# Patient Record
Sex: MALE | Race: ASIAN | NOT HISPANIC OR LATINO | Employment: UNEMPLOYED | URBAN - METROPOLITAN AREA
[De-identification: names, ages, dates, MRNs, and addresses within clinical notes are randomized per-mention and may not be internally consistent; named-entity substitution may affect disease eponyms.]

---

## 2017-02-20 ENCOUNTER — OFFICE VISIT (OUTPATIENT)
Dept: FAMILY MEDICINE | Facility: CLINIC | Age: 77
End: 2017-02-20

## 2017-02-20 VITALS
SYSTOLIC BLOOD PRESSURE: 142 MMHG | BODY MASS INDEX: 29.19 KG/M2 | DIASTOLIC BLOOD PRESSURE: 78 MMHG | HEART RATE: 80 BPM | TEMPERATURE: 98.4 F | WEIGHT: 157.6 LBS

## 2017-02-20 DIAGNOSIS — M1A.9XX0 CHRONIC GOUT WITHOUT TOPHUS, UNSPECIFIED CAUSE, UNSPECIFIED SITE: ICD-10-CM

## 2017-02-20 DIAGNOSIS — K21.9 GASTROESOPHAGEAL REFLUX DISEASE, ESOPHAGITIS PRESENCE NOT SPECIFIED: ICD-10-CM

## 2017-02-20 DIAGNOSIS — E03.9 HYPOTHYROIDISM, UNSPECIFIED TYPE: Primary | ICD-10-CM

## 2017-02-20 DIAGNOSIS — G62.9 NEUROPATHY: ICD-10-CM

## 2017-02-20 LAB — TSH SERPL DL<=0.05 MIU/L-ACNC: 5.5 UIU/ML (ref 0.3–5)

## 2017-02-20 RX ORDER — COLCHICINE 0.6 MG/1
0.6 TABLET ORAL DAILY
Qty: 30 TABLET | Refills: 5 | Status: SHIPPED | OUTPATIENT
Start: 2017-02-20 | End: 2017-04-12

## 2017-02-20 RX ORDER — OMEGA-3 FATTY ACIDS/FISH OIL 300-1000MG
400 CAPSULE ORAL EVERY 8 HOURS PRN
Qty: 120 CAPSULE | Refills: 0 | Status: SHIPPED | OUTPATIENT
Start: 2017-02-20 | End: 2017-06-02

## 2017-02-20 RX ORDER — GABAPENTIN 100 MG/1
100 CAPSULE ORAL 2 TIMES DAILY
Qty: 60 CAPSULE | Refills: 3 | Status: SHIPPED | OUTPATIENT
Start: 2017-02-20 | End: 2017-11-03

## 2017-02-20 RX ORDER — ALLOPURINOL 100 MG/1
100 TABLET ORAL DAILY
Qty: 30 TABLET | Refills: 11 | Status: SHIPPED | OUTPATIENT
Start: 2017-02-20 | End: 2018-03-14

## 2017-02-20 NOTE — PROGRESS NOTES
SUBJECTIVE       Rafy Riggins is a 77 year old male with a PMH significant for:     Patient Active Problem List   Diagnosis     HTN (hypertension)     Back pain     Poor vision     S/P lumbar spinal fusion     Colon polyp     Diabetes mellitus, type 2 (H)     Obesity     Lumbosacral radiculopathy at S1     He presents to discuss:    Left foot pain:  Has long history of pain in left foot, with intermittent flare ups. Thought by his PCP to be secondary to osteoarthritis vs gout. Uric acid level was elevated at 8.7 in 10/2016, however patient declined any uric acid lowering medications. His pain has been treated effectively with intermittent NSAIDs in the past. His current pain has been present over the past week. It was located initially over the heel and dorsal foot, and is now mainly localized in the great toe.  He has had some swelling of the foot that comes and goes. No fevers or chills.  He has not been taking any pain medication for this pain as he was out of pills. He was previously taking gabapentin twice daily which was helpful for his neuropathy pains down his legs, but he has been out of this for about 1 month. He is unsure of his past dose of gabapentin.     He also requests a handicap parking form as he has a hard time ambulating even short distances due to lower extremity pain. He requires the assistance of a cane when walking.  The handicap form was filled out today and given to patient.     PMH, Medications and Allergies were reviewed and updated as needed.        REVIEW OF SYSTEMS     Pertinent positives and negatives noted in HPI.         OBJECTIVE     Vitals:    02/20/17 0900   BP: 142/78   BP Location: Left arm   Patient Position: Chair   Cuff Size: Adult Large   Pulse: 80   Temp: 98.4  F (36.9  C)   TempSrc: Oral   Weight: 157 lb 9.6 oz (71.5 kg)      Body mass index is 29.19 kg/(m^2).    General: Alert, NAD  Neck: No cervical adenopathy, no thyromegaly  CV: RRR w/o m/r/g  Pulm:  CTAB without wheezes or crackles, no increased WOB  Extremities: No RAD, tenderness to palpation over left great toe, no appreciable redness, warmth, or swelling, good ROM of both feet  Psych: Pleasant mood, normal affect      No results found for this or any previous visit (from the past 24 hour(s)).        ASSESSMENT AND PLAN     Rafy was seen today for musculoskeletal problem, musculoskeletal problem and forms.    Diagnoses and all orders for this visit:    Hypothyroidism, unspecified type: Taking Synthroid 25mcg daily (he shows me the bottle). TSH last checked 12/9/16.  PCP had planned to recheck his TSH after a month, so will draw this today.   -     TSH  Sensitive ([facility])    Gastroesophageal reflux disease, esophagitis presence not specified  -     ranitidine (ZANTAC) 150 MG tablet; Take 1 tablet (150 mg) by mouth 2 times daily as needed for heartburn    Chronic gout without tophus, unspecified cause, unspecified site: Patient may be noting another gout flare in his left foot. Since NSAIDs have been helpful for flares in the past and no known cardiac or renal disease, will order a course of ibuprofen and advised to schedule it for 7 days and then follow-up to ensure improvement. I am concerned that since he has had at least 2 flares in the past year and uric acid level of 8.7, he may be at risk for additional gout flares. He is agreeable to taking a urate lowering medication including allopurinol with colchicine (for at least 6 months), so will start these today. Will recheck uric acid in 4 weeks and titrate allopurinol as needed.   -     ibuprofen 200 MG capsule; Take 400 mg by mouth every 8 hours as needed for pain  -     allopurinol (ZYLOPRIM) 100 MG tablet; Take 1 tablet (100 mg) by mouth daily  -     colchicine (COLCRYS) 0.6 MG tablet; Take 1 tablet (0.6 mg) by mouth daily    Neuropathy (H): Pt requests a refill on gabapentin which has been helpful for his lower extremity neuropathy pains in the  past. It is not clear what his previous dose was, but he states he previously took it twice daily so will start with a low dose of 100mg BID.   -     gabapentin (NEURONTIN) 100 MG capsule; Take 1 capsule (100 mg) by mouth 2 times daily          RTC in 1 week for follow up of foot pain or sooner if develops new or worsening symptoms.    Patient's plan of care was discussed with Dr. Lange.    Laura Saavedra MD PGY-3  Pager #: [phone]

## 2017-02-20 NOTE — PROGRESS NOTES
Preceptor attestation:  Vital signs reviewed: /78 (BP Location: Left arm, Patient Position: Chair, Cuff Size: Adult Large)  Pulse 80  Temp 98.4  F (36.9  C) (Oral)  Wt 157 lb 9.6 oz (71.5 kg)  BMI 29.19 kg/m2    Preceptor attestation:  Patient seen and discussed with the resident. Assessment and plan reviewed with resident and agreed upon.    Supervising physician: Lou Lange MD  [facility]

## 2017-02-20 NOTE — MR AVS SNAPSHOT
After Visit Summary   2017    Rafy Riggins    MRN: [id number]           Patient Information     Date Of Birth          1940        Visit Information        Provider Department      2017 8:20 AM Laura Saavedra MD [facility]        Today's Diagnoses     Hypothyroidism, unspecified type    -  1    Gastroesophageal reflux disease, esophagitis presence not specified        Chronic gout without tophus, unspecified cause, unspecified site        Neuropathy (H)          Care Instructions    Return in 1 week to follow-up on foot pain and labs        Follow-ups after your visit        Who to contact     Please call your clinic at [phone] to:    Ask questions about your health    Make or cancel appointments    Discuss your medicines    Learn about your test results    Speak to your doctor   If you have compliments or concerns about an experience at your clinic, or if you wish to file a complaint, please contact [facility] Patient Relations at [phone] or email us at [email or web address]         Additional Information About Your Visit        MyChart Information     [employer] is an electronic gateway that provides easy, online access to your medical records. With [employer], you can request a clinic appointment, read your test results, renew a prescription or communicate with your care team.     To sign up for [employer] visit the website at [email or web address]   You will be asked to enter the access code listed below, as well as some personal information. Please follow the directions to create your username and password.     Your access code is: [email or web address]  Expires: 3/9/2017  9:23 AM     Your access code will  in 90 days. If you need help or a new code, please contact your [facility] or call [phone] for assistance.        Care EveryWhere ID     This is your Care EveryWhere ID. This could be used  by other organizations to access your [place] medical records  [id number]        Your Vitals Were     Pulse Temperature BMI (Body Mass Index)             80 98.4  F (36.9  C) (Oral) 29.19 kg/m2          Blood Pressure from Last 3 Encounters:   02/20/17 142/78   12/09/16 167/82   10/20/16 153/86    Weight from Last 3 Encounters:   02/20/17 157 lb 9.6 oz (71.5 kg)   12/09/16 156 lb (70.8 kg)   10/20/16 156 lb 6.4 oz (70.9 kg)              We Performed the Following     TSH  Sensitive ([facility])          Today's Medication Changes          These changes are accurate as of: 2/20/17  9:24 AM.  If you have any questions, ask your nurse or doctor.               Start taking these medicines.        Dose/Directions    allopurinol 100 MG tablet   Commonly known as:  ZYLOPRIM   Used for:  Chronic gout without tophus, unspecified cause, unspecified site   Started by:  Laura Saavedra MD        Dose:  100 mg   Take 1 tablet (100 mg) by mouth daily   Quantity:  30 tablet   Refills:  11       colchicine 0.6 MG tablet   Commonly known as:  COLCRYS   Used for:  Chronic gout without tophus, unspecified cause, unspecified site   Started by:  Laura Saavedra MD        Dose:  0.6 mg   Take 1 tablet (0.6 mg) by mouth daily   Quantity:  30 tablet   Refills:  5         These medicines have changed or have updated prescriptions.        Dose/Directions    gabapentin 100 MG capsule   Commonly known as:  NEURONTIN   This may have changed:    - medication strength  - how much to take  - when to take this   Used for:  Neuropathy (H)   Changed by:  Laura Saavedra MD        Dose:  100 mg   Take 1 capsule (100 mg) by mouth 2 times daily   Quantity:  60 capsule   Refills:  3       * ibuprofen 400 MG tablet   Commonly known as:  ADVIL/MOTRIN   This may have changed:  Another medication with the same name was added. Make sure you understand how and when to take each.   Used for:  Acute left ankle pain   Changed by:  Romaine Blank DO         Dose:  400 mg   Take 1 tablet (400 mg) by mouth every 8 hours as needed for moderate pain Please take for 7-10 days   Quantity:  120 tablet   Refills:  0       * ibuprofen 200 MG capsule   This may have changed:  You were already taking a medication with the same name, and this prescription was added. Make sure you understand how and when to take each.   Used for:  Chronic gout without tophus, unspecified cause, unspecified site   Changed by:  Laura Saavedra MD        Dose:  400 mg   Take 400 mg by mouth every 8 hours as needed for pain   Quantity:  120 capsule   Refills:  0       ranitidine 150 MG tablet   Commonly known as:  ZANTAC   This may have changed:    - when to take this  - reasons to take this   Used for:  Gastroesophageal reflux disease, esophagitis presence not specified   Changed by:  Laura Saavedra MD        Dose:  150 mg   Take 1 tablet (150 mg) by mouth 2 times daily as needed for heartburn   Quantity:  60 tablet   Refills:  3       * Notice:  This list has 2 medication(s) that are the same as other medications prescribed for you. Read the directions carefully, and ask your doctor or other care provider to review them with you.         Where to get your medicines      These medications were sent to [facility] [address] [place] [address] [address] [facility] [address]  [address] [facility] [address]    Hours:  24-hours Phone:  [phone]     allopurinol 100 MG tablet    colchicine 0.6 MG tablet    gabapentin 100 MG capsule    ibuprofen 200 MG capsule    ranitidine 150 MG tablet                Primary Care Provider Office Phone # Fax #    Romaine Blank [phone]       [address] [address]        Thank you!     Thank you for choosing [facility]  for your care. Our goal is always to provide you with excellent care. Hearing back from our patients is one way we can continue to improve our  services. Please take a few minutes to complete the written survey that you may receive in the mail after your visit with us. Thank you!             Your Updated Medication List - Protect others around you: Learn how to safely use, store and throw away your medicines at www.disposemymeds.org.          This list is accurate as of: 2/20/17  9:24 AM.  Always use your most recent med list.                   Brand Name Dispense Instructions for use    Adult Blood Pressure Cuff Lg Kit     1 kit    1 Device daily       allopurinol 100 MG tablet    ZYLOPRIM    30 tablet    Take 1 tablet (100 mg) by mouth daily       Ankle Stirrup Brace/Left Misc      Use as needed       aspirin 81 MG tablet     90 tablet    Take 1 tablet (81 mg) by mouth daily       atorvastatin 40 MG tablet    LIPITOR    90 tablet    Take 1 tablet (40 mg) by mouth daily       colchicine 0.6 MG tablet    COLCRYS    30 tablet    Take 1 tablet (0.6 mg) by mouth daily       gabapentin 100 MG capsule    NEURONTIN    60 capsule    Take 1 capsule (100 mg) by mouth 2 times daily       * ibuprofen 400 MG tablet    ADVIL/MOTRIN    120 tablet    Take 1 tablet (400 mg) by mouth every 8 hours as needed for moderate pain Please take for 7-10 days       * ibuprofen 200 MG capsule     120 capsule    Take 400 mg by mouth every 8 hours as needed for pain       levothyroxine 50 MCG tablet    SYNTHROID/LEVOTHROID    90 tablet    Take 1 tablet (50 mcg) by mouth daily       lisinopril 20 MG tablet    PRINIVIL/ZESTRIL    90 tablet    Take 1 tablet (20 mg) by mouth daily       multivitamin, therapeutic with minerals Tabs tablet     100 tablet    Take 1 tablet by mouth daily       ranitidine 150 MG tablet    ZANTAC    60 tablet    Take 1 tablet (150 mg) by mouth 2 times daily as needed for heartburn       * Notice:  This list has 2 medication(s) that are the same as other medications prescribed for you. Read the directions carefully, and ask your doctor or other care provider to  review them with you.

## 2017-03-01 ENCOUNTER — OFFICE VISIT (OUTPATIENT)
Dept: FAMILY MEDICINE | Facility: CLINIC | Age: 77
End: 2017-03-01

## 2017-03-01 VITALS
TEMPERATURE: 98.3 F | DIASTOLIC BLOOD PRESSURE: 73 MMHG | BODY MASS INDEX: 29.63 KG/M2 | SYSTOLIC BLOOD PRESSURE: 139 MMHG | WEIGHT: 160 LBS | HEART RATE: 60 BPM

## 2017-03-01 DIAGNOSIS — E03.9 HYPOTHYROIDISM, UNSPECIFIED TYPE: ICD-10-CM

## 2017-03-01 DIAGNOSIS — M10.9 GOUT OF FOOT, UNSPECIFIED CAUSE, UNSPECIFIED CHRONICITY, UNSPECIFIED LATERALITY: Primary | ICD-10-CM

## 2017-03-01 RX ORDER — LEVOTHYROXINE SODIUM 25 UG/1
50 TABLET ORAL DAILY
Qty: 60 TABLET | Refills: 3 | Status: SHIPPED | OUTPATIENT
Start: 2017-03-01 | End: 2017-04-12

## 2017-03-01 NOTE — PATIENT INSTRUCTIONS
Schedule lab only appt in 6 weeks to recheck uric acid and thyroid labs    Return 3 days after lab appt for a clinic visit to discuss results

## 2017-03-01 NOTE — PROGRESS NOTES
Preceptor attestation:  Patient seen and discussed with the resident. Assessment and plan reviewed with resident and agreed upon.  Supervising physician: Leo James  [facility]

## 2017-03-01 NOTE — MR AVS SNAPSHOT
After Visit Summary   3/1/2017    Rafy Rigigns    MRN: [id number]           Patient Information     Date Of Birth          1940        Visit Information        Provider Department      3/1/2017 11:00 AM Laura Saavedra MD [facility]        Today's Diagnoses     Hypothyroidism due to acquired atrophy of thyroid          Care Instructions    Schedule lab only appt in 6 weeks to recheck uric acid and thyroid labs    Return 3 days after lab appt for a clinic visit to discuss results        Follow-ups after your visit        Future tests that were ordered for you today     Open Future Orders        Priority Expected Expires Ordered    Uric Acid ([facility]) Routine  3/1/2020 3/1/2017    TSH  Sensitive ([facility]) Routine 3/1/2017 2017 3/1/2017            Who to contact     Please call your clinic at [phone] to:    Ask questions about your health    Make or cancel appointments    Discuss your medicines    Learn about your test results    Speak to your doctor   If you have compliments or concerns about an experience at your clinic, or if you wish to file a complaint, please contact [facility] Patient Relations at [phone] or email us at [email or web address]         Additional Information About Your Visit        MyChart Information     [employer] is an electronic gateway that provides easy, online access to your medical records. With [employer], you can request a clinic appointment, read your test results, renew a prescription or communicate with your care team.     To sign up for [employer] visit the website at [email or web address]   You will be asked to enter the access code listed below, as well as some personal information. Please follow the directions to create your username and password.     Your access code is: [email or web address]  Expires: 3/9/2017  9:23 AM     Your access code will  in 90 days. If you need help or a new code, please  contact your [facility] or call [phone] for assistance.        Care EveryWhere ID     This is your Care EveryWhere ID. This could be used by other organizations to access your [place] medical records  [id number]        Your Vitals Were     Pulse Temperature BMI (Body Mass Index)             60 98.3  F (36.8  C) (Oral) 29.63 kg/m2          Blood Pressure from Last 3 Encounters:   03/01/17 139/73   02/20/17 142/78   12/09/16 167/82    Weight from Last 3 Encounters:   03/01/17 160 lb (72.6 kg)   02/20/17 157 lb 9.6 oz (71.5 kg)   12/09/16 156 lb (70.8 kg)                 Today's Medication Changes          These changes are accurate as of: 3/1/17 11:31 AM.  If you have any questions, ask your nurse or doctor.               These medicines have changed or have updated prescriptions.        Dose/Directions    levothyroxine 25 MCG tablet   Commonly known as:  SYNTHROID/LEVOTHROID   This may have changed:  medication strength   Used for:  Hypothyroidism due to acquired atrophy of thyroid   Changed by:  Laura Saavedra MD        Dose:  50 mcg   Take 2 tablets (50 mcg) by mouth daily   Quantity:  60 tablet   Refills:  3            Where to get your medicines      These medications were sent to [facility] [address] [place] [address] [address] [facility] [address]  [address] [facility] [address]    Hours:  24-hours Phone:  [phone]     levothyroxine 25 MCG tablet                Primary Care Provider Office Phone # Fax #    Romaine DO Rosamaria [phone]       [address] [address]        Thank you!     Thank you for choosing [facility]  for your care. Our goal is always to provide you with excellent care. Hearing back from our patients is one way we can continue to improve our services. Please take a few minutes to complete the written survey that you may receive in the mail after your visit  with us. Thank you!             Your Updated Medication List - Protect others around you: Learn how to safely use, store and throw away your medicines at www.disposemymeds.org.          This list is accurate as of: 3/1/17 11:31 AM.  Always use your most recent med list.                   Brand Name Dispense Instructions for use    Adult Blood Pressure Cuff Lg Kit     1 kit    1 Device daily       allopurinol 100 MG tablet    ZYLOPRIM    30 tablet    Take 1 tablet (100 mg) by mouth daily       Ankle Stirrup Brace/Left Misc      Use as needed       aspirin 81 MG tablet     90 tablet    Take 1 tablet (81 mg) by mouth daily       atorvastatin 40 MG tablet    LIPITOR    90 tablet    Take 1 tablet (40 mg) by mouth daily       colchicine 0.6 MG tablet    COLCRYS    30 tablet    Take 1 tablet (0.6 mg) by mouth daily       gabapentin 100 MG capsule    NEURONTIN    60 capsule    Take 1 capsule (100 mg) by mouth 2 times daily       * ibuprofen 400 MG tablet    ADVIL/MOTRIN    120 tablet    Take 1 tablet (400 mg) by mouth every 8 hours as needed for moderate pain Please take for 7-10 days       * ibuprofen 200 MG capsule     120 capsule    Take 400 mg by mouth every 8 hours as needed for pain       levothyroxine 25 MCG tablet    SYNTHROID/LEVOTHROID    60 tablet    Take 2 tablets (50 mcg) by mouth daily       lisinopril 20 MG tablet    PRINIVIL/ZESTRIL    90 tablet    Take 1 tablet (20 mg) by mouth daily       multivitamin, therapeutic with minerals Tabs tablet     100 tablet    Take 1 tablet by mouth daily       ranitidine 150 MG tablet    ZANTAC    60 tablet    Take 1 tablet (150 mg) by mouth 2 times daily as needed for heartburn       * Notice:  This list has 2 medication(s) that are the same as other medications prescribed for you. Read the directions carefully, and ask your doctor or other care provider to review them with you.

## 2017-03-01 NOTE — PROGRESS NOTES
SUBJECTIVE       Rafy Riggins is a 77 year old  male with a PMH significant for:     Patient Active Problem List   Diagnosis     HTN (hypertension)     Back pain     Poor vision     S/P lumbar spinal fusion     Colon polyp     Diabetes mellitus, type 2 (H)     Obesity     Lumbosacral radiculopathy at S1     He presents to follow-up on gout. Was seen on 2/20/17 by myself with a presumed acute gout flare in left foot. Was treated with a 7 day course of ibuprofen, which he completed. He reports his pain is much better now. He had previously declined urate lowering meds, but was agreeable to starting them at last visit. He was therefore started on allopurinol and colchicine due to several presumed gout flares in the past year and a uric acid level which was elevated at 8.7 in 10/2016. He reports he has been taking both the allopurinol and colchicine every day and is tolerating these.    Also history of hypothyroidism. Checked a TSH at last visit which was slightly above goal range. He shows me his Synthroid bottle and tells me he has been taking 1 tablet daily of the 25mcg pills.      PMH, Medications and Allergies were reviewed and updated as needed.        REVIEW OF SYSTEMS     Pertinent positives and negatives noted in HPI.           OBJECTIVE     Vitals:    03/01/17 1113 03/01/17 1114   BP: 145/71 139/73   Pulse: 60    Temp: 98.3  F (36.8  C)    TempSrc: Oral    Weight: 160 lb (72.6 kg)      Body mass index is 29.63 kg/(m^2).    General: Alert, NAD  CV: RRR w/o m/r/g  Pulm: CTAB without wheezes or crackles, no increased WOB  Psych: Pleasant mood, normal affect      No results found for this or any previous visit (from the past 24 hour(s)).        ASSESSMENT AND PLAN       Gout of foot, unspecified cause, unspecified chronicity, unspecified laterality: Pain from recent visit is improved. Seems to have likely been a gout flare given the improvement with NSAIDs. Will continue the allopurinol and  colchicine and will recheck uric acid level in 6 weeks (said 6 weeks instead of 4 so can check both the TSH and uric acid at the same time for pt convenience). He will schedule a lab only appt in 6 weeks and return to clinic a few days after labs to discuss the results together.      Hypothyroidism, unspecified type: TSH mildly elevated on recent check. Will increase from 25 to 50 mcg daily of Synthroid and recheck TSH in 6 weeks as above.   -     levothyroxine (SYNTHROID/LEVOTHROID) 25 MCG tablet; Take 2 tablets (50 mcg) by mouth daily  -     Uric Acid ([facility]); Future  -     TSH  Sensitive ([facility]); Future        RTC in 6 weeks for lab-only appt and a few days after for clinic appt to discuss results  or sooner if develops new or worsening symptoms.    Patient's plan of care was discussed with Dr. James.    Laura Saavedra MD PGY-3  Pager #: [phone]

## 2017-03-02 PROBLEM — E03.9 HYPOTHYROIDISM: Status: ACTIVE | Noted: 2017-03-02

## 2017-03-02 PROBLEM — M10.9 GOUT OF FOOT: Status: ACTIVE | Noted: 2017-03-02

## 2017-03-20 DIAGNOSIS — M10.9 GOUT, UNSPECIFIED CAUSE, UNSPECIFIED CHRONICITY, UNSPECIFIED SITE: Primary | ICD-10-CM

## 2017-03-20 DIAGNOSIS — E03.9 HYPOTHYROIDISM, UNSPECIFIED TYPE: ICD-10-CM

## 2017-04-10 DIAGNOSIS — E03.9 HYPOTHYROIDISM, UNSPECIFIED TYPE: ICD-10-CM

## 2017-04-10 DIAGNOSIS — M10.9 GOUT, UNSPECIFIED CAUSE, UNSPECIFIED CHRONICITY, UNSPECIFIED SITE: ICD-10-CM

## 2017-04-10 LAB
TSH SERPL DL<=0.05 MIU/L-ACNC: 5.87 UIU/ML (ref 0.3–5)
URATE SERPL-MCNC: 8.7 MG/DL (ref 3–8)

## 2017-04-12 ENCOUNTER — OFFICE VISIT (OUTPATIENT)
Dept: FAMILY MEDICINE | Facility: CLINIC | Age: 77
End: 2017-04-12

## 2017-04-12 VITALS
BODY MASS INDEX: 28.59 KG/M2 | SYSTOLIC BLOOD PRESSURE: 143 MMHG | HEART RATE: 60 BPM | TEMPERATURE: 98.1 F | WEIGHT: 154.38 LBS | OXYGEN SATURATION: 99 % | DIASTOLIC BLOOD PRESSURE: 81 MMHG

## 2017-04-12 DIAGNOSIS — M1A.9XX0 CHRONIC GOUT WITHOUT TOPHUS, UNSPECIFIED CAUSE, UNSPECIFIED SITE: ICD-10-CM

## 2017-04-12 DIAGNOSIS — E03.9 HYPOTHYROIDISM, UNSPECIFIED TYPE: Primary | ICD-10-CM

## 2017-04-12 DIAGNOSIS — M10.9 GOUT OF FOOT, UNSPECIFIED CAUSE, UNSPECIFIED CHRONICITY, UNSPECIFIED LATERALITY: ICD-10-CM

## 2017-04-12 RX ORDER — LEVOTHYROXINE SODIUM 50 UG/1
50 TABLET ORAL DAILY
Qty: 30 TABLET | Refills: 5 | Status: SHIPPED | OUTPATIENT
Start: 2017-04-12 | End: 2017-05-26

## 2017-04-12 NOTE — PROGRESS NOTES
SUBJECTIVE       Rafy Riggins is a 77 year old  male with a PMH significant for:     Patient Active Problem List   Diagnosis     HTN (hypertension)     Back pain     Poor vision     S/P lumbar spinal fusion     Colon polyp     Diabetes mellitus, type 2 (H)     Obesity     Lumbosacral radiculopathy at S1     Gout of foot     Hypothyroidism     He presents to discuss:    Labwork: Had labs drawn on 4/10/17, presents to discuss results. Uric acid level was checked for gout history and was elevated at 8.7. TSH was checked for history of hypothyroidism and was elevated at 5.87. Supposed to be taking allopurinol 100mg daily with colchicine 0.6mg daily for gout. Also supposed to be taking levothyroxine 50mcg daily for the hypothyroidism. He reports his gout has been well controlled lately, no recent flares. However, he does not have the allopurinol or colchicine bottles with him and he does not think he has been taking them but is not sure, and he is a poor historian regarding his meds. He also has only been taking 25mcg Levothyroxine (shows me this bottle) but was supposed to be taking 50mcg daily. He reports he has a home health nurse that comes every few months. He tells me his son also acts as his PCA and helps him with medication set-up. His son is Pavel Riggins at [phone] and patient gives me permission to call him to discuss his meds.     Reviewed the other meds on his list and he does appear to be taking them correctly.     PMH, Medications and Allergies were reviewed and updated as needed.        REVIEW OF SYSTEMS     Pertinent positives and negatives noted in HPI.         OBJECTIVE     Vitals:    04/12/17 1120   BP: 143/81   BP Location: Right arm   Patient Position: Chair   Cuff Size: Adult Regular   Pulse: 60   Temp: 98.1  F (36.7  C)   TempSrc: Oral   SpO2: 99%   Weight: 154 lb 6 oz (70 kg)     Body mass index is 28.59 kg/(m^2).    General: Alert, NAD  CV: RRR w/o m/r/g  Pulm: CTAB  without wheezes or crackles, no increased WOB  Extremities: No RAD  Psych: Pleasant mood, normal affect    No results found for this or any previous visit (from the past 24 hour(s)).    The 10-year ASCVD risk score ([place] SANDY Jr, et al., 2013) is: 57.3%    Values used to calculate the score:      Age: 77 years      Sex: Male      Is Non- : No      Diabetic: Yes      Tobacco smoker: No      Systolic Blood Pressure: 143 mmHg      Is BP treated: No      HDL Cholesterol: 44.4 mg/dL      Total Cholesterol: 206 mg/dL      ASSESSMENT AND PLAN     Rafy was seen today for recheck.    Diagnoses and all orders for this visit:    Hypothyroidism, unspecified type: TSH mildly elevated on check on 4/10/17. Pt supposed to be taking Synthroid 50mcg daily but has been taking only 25mcg daily (despite explaining the increase at last visit). Pt seems to have poor understanding of his meds and is a fairly poor historian. Son helps with his meds. Called son (who speaks English) after visit and explained the dosing for the Synthroid and the gout meds below.   -     levothyroxine (SYNTHROID/LEVOTHROID) 50 MCG tablet; Take 1 tablet (50 mcg) by mouth daily    Gout of foot, unspecified cause, unspecified chronicity, unspecified laterality: Uric acid level elevated at 8.7 on 4/10/17. Does not appear to be taking his allopurinol or colchicine (called pharmacy and last filled in 2/2017, and son thinks he has been off these for at least a couple weeks). Explained dosing to son and he will pick these meds up for patient. Encouraged to take consistently and will recheck uric acid in 6 weeks.         RTC in 6 weeks for recheck labs or sooner if develops new or worsening symptoms.    Patient's plan of care was discussed with Dr. James.    Laura Saavedra MD PGY-3  Pager #: [phone]

## 2017-04-12 NOTE — MR AVS SNAPSHOT
After Visit Summary   4/12/2017    Rafy Riggins    MRN: [id number]           Patient Information     Date Of Birth          1940        Visit Information        Provider Department      4/12/2017 11:20 AM Laura Saavedra MD [facility]        Today's Diagnoses     Hypothyroidism, unspecified type    -  1    Gout of foot, unspecified cause, unspecified chronicity, unspecified laterality           Follow-ups after your visit        Follow-up notes from your care team     Return in about 6 weeks (around 5/24/2017) for recheck labs.      Your next 10 appointments already scheduled     May 24, 2017  2:00 PM CDT   LAB VISIT with [facility]   [facility] ([facility])    [address] [place] [address]   [phone]           If you are coming in for fasting labs, you will need to fast for 10-12 hours prior to your appt. Fasting labs include lipids, cholesterol, glucose, complete metabolic panel, basic metabolic panel, and triglycerides. Do not drink coffee or any other fluids. Water with medications are okay. Do not chew gum as well. If you have any further questions, please contact your health care team.              May 26, 2017  2:10 PM CDT   Return Visit with Laura Saavedra MD   [facility]    [address] [place] [address]   [phone]              Who to contact     Please call your clinic at [phone] to:    Ask questions about your health    Make or cancel appointments    Discuss your medicines    Learn about your test results    Speak to your doctor   If you have compliments or concerns about an experience at your clinic, or if you wish to file a complaint, please contact [facility] Patient Relations at [phone] or email us at [email or web address]         Additional Information About Your Visit        Zhaopinhart Information     Telematik is an electronic gateway that provides easy, online access to  your medical records. With modu, you can request a clinic appointment, read your test results, renew a prescription or communicate with your care team.     To sign up for modu visit the website at [email or web address]   You will be asked to enter the access code listed below, as well as some personal information. Please follow the directions to create your username and password.     Your access code is: [email or web address]  Expires: 2017  8:23 AM     Your access code will  in 90 days. If you need help or a new code, please contact your [facility] or call [phone] for assistance.        Care EveryWhere ID     This is your Care EveryWhere ID. This could be used by other organizations to access your [facility] medical records  [id number]        Your Vitals Were     Pulse Temperature Pulse Oximetry BMI (Body Mass Index)          60 98.1  F (36.7  C) (Oral) 99% 28.59 kg/m2         Blood Pressure from Last 3 Encounters:   17 143/81   17 139/73   17 142/78    Weight from Last 3 Encounters:   17 154 lb 6 oz (70 kg)   17 160 lb (72.6 kg)   17 157 lb 9.6 oz (71.5 kg)              Today, you had the following     No orders found for display         Today's Medication Changes          These changes are accurate as of: 17 11:59 PM.  If you have any questions, ask your nurse or doctor.               These medicines have changed or have updated prescriptions.        Dose/Directions    levothyroxine 50 MCG tablet   Commonly known as:  SYNTHROID/LEVOTHROID   This may have changed:  medication strength   Used for:  Hypothyroidism, unspecified type   Changed by:  Laura Saavedra MD        Dose:  50 mcg   Take 1 tablet (50 mcg) by mouth daily   Quantity:  30 tablet   Refills:  5            Where to get your medicines      These medications were sent to [facility] [address] [facility] [address] [facility] [address]  [address] [facility] [address]    Hours:  24-hours Phone:  [phone]     colchicine 0.6 MG tablet    levothyroxine 50 MCG tablet                Primary Care Provider Office Phone # Fax #    Romaine Blank [phone]       [facility] [address] [facility] [address]        Thank you!     Thank you for choosing [facility]  for your care. Our goal is always to provide you with excellent care. Hearing back from our patients is one way we can continue to improve our services. Please take a few minutes to complete the written survey that you may receive in the mail after your visit with us. Thank you!             Your Updated Medication List - Protect others around you: Learn how to safely use, store and throw away your medicines at www.disposemymeds.org.          This list is accurate as of: 4/12/17 11:59 PM.  Always use your most recent med list.                   Brand Name Dispense Instructions for use    Adult Blood Pressure Cuff Lg Kit     1 kit    1 Device daily       allopurinol 100 MG tablet    ZYLOPRIM    30 tablet    Take 1 tablet (100 mg) by mouth daily       Ankle Stirrup Brace/Left Misc      Use as needed       aspirin 81 MG tablet     90 tablet    Take 1 tablet (81 mg) by mouth daily       atorvastatin 40 MG tablet    LIPITOR    90 tablet    Take 1 tablet (40 mg) by mouth daily       colchicine 0.6 MG tablet    COLCRYS    30 tablet    Take 1 tablet (0.6 mg) by mouth daily       gabapentin 100 MG capsule    NEURONTIN    60 capsule    Take 1 capsule (100 mg) by mouth 2 times daily       ibuprofen 200 MG capsule     120 capsule    Take 400 mg by mouth every 8 hours as needed for pain       levothyroxine 50 MCG tablet    SYNTHROID/LEVOTHROID    30 tablet    Take 1 tablet (50 mcg) by mouth daily       multivitamin, therapeutic with minerals Tabs tablet     100 tablet    Take 1 tablet by mouth daily       ranitidine 150 MG tablet    ZANTAC    60 tablet     Take 1 tablet (150 mg) by mouth 2 times daily as needed for heartburn

## 2017-04-12 NOTE — PROGRESS NOTES
Preceptor attestation:  Patient seen and discussed with the resident. Assessment and plan reviewed with resident and agreed upon.  Supervising physician: Leo James  [facility]

## 2017-04-12 NOTE — PROGRESS NOTES
TSH and uric acid results from 4/10/17 were discussed with patient in clinic on 4/12/2017. Laura Saavedra MD

## 2017-04-17 RX ORDER — COLCHICINE 0.6 MG/1
0.6 TABLET ORAL DAILY
Qty: 30 TABLET | Refills: 1 | Status: SHIPPED | OUTPATIENT
Start: 2017-04-17 | End: 2017-11-27

## 2017-05-24 DIAGNOSIS — M10.9 GOUT, UNSPECIFIED CAUSE, UNSPECIFIED CHRONICITY, UNSPECIFIED SITE: ICD-10-CM

## 2017-05-24 DIAGNOSIS — E03.9 HYPOTHYROIDISM, UNSPECIFIED TYPE: ICD-10-CM

## 2017-05-24 DIAGNOSIS — E03.9 HYPOTHYROIDISM, UNSPECIFIED TYPE: Primary | ICD-10-CM

## 2017-05-24 LAB
TSH SERPL DL<=0.05 MIU/L-ACNC: 0.91 UIU/ML (ref 0.3–5)
URATE SERPL-MCNC: 6.8 MG/DL (ref 3–8)

## 2017-05-26 ENCOUNTER — OFFICE VISIT (OUTPATIENT)
Dept: FAMILY MEDICINE | Facility: CLINIC | Age: 77
End: 2017-05-26

## 2017-05-26 VITALS
TEMPERATURE: 98.3 F | SYSTOLIC BLOOD PRESSURE: 115 MMHG | HEIGHT: 61 IN | WEIGHT: 157 LBS | BODY MASS INDEX: 29.64 KG/M2 | DIASTOLIC BLOOD PRESSURE: 76 MMHG | HEART RATE: 75 BPM

## 2017-05-26 DIAGNOSIS — M10.9 GOUT OF FOOT, UNSPECIFIED CAUSE, UNSPECIFIED CHRONICITY, UNSPECIFIED LATERALITY: Primary | ICD-10-CM

## 2017-05-26 DIAGNOSIS — E11.9 TYPE 2 DIABETES MELLITUS WITHOUT COMPLICATION, WITHOUT LONG-TERM CURRENT USE OF INSULIN (H): ICD-10-CM

## 2017-05-26 DIAGNOSIS — E03.9 HYPOTHYROIDISM, UNSPECIFIED TYPE: ICD-10-CM

## 2017-05-26 RX ORDER — LEVOTHYROXINE SODIUM 75 UG/1
75 TABLET ORAL DAILY
Qty: 90 TABLET | Refills: 3 | Status: SHIPPED | OUTPATIENT
Start: 2017-05-26 | End: 2017-06-02

## 2017-05-26 NOTE — PROGRESS NOTES
"       SUBJECTIVE       Rafy Riggins is a 77 year old  male with a PMH significant for:     Patient Active Problem List   Diagnosis     HTN (hypertension)     Back pain     Poor vision     S/P lumbar spinal fusion     Colon polyp     Diabetes mellitus, type 2 (H)     Obesity     Lumbosacral radiculopathy at S1     Gout of foot     Hypothyroidism     He presents to:    Follow-up on lab results from 5/24/17. He has history of gout and had a uric acid level drawn on 5/24 which was 6.8, improved from 8.7 on 4/10/17. Shows me his pill bottles and tells me he has been taking his allopurinol 100mg daily. Has also been prescribed colchicine but he has not been taking this. His gout is well controlled - no recent gout flares.     History of hypothyroidism. TSH was 0.91 on 5/24/17, compared to 5.8 in 4/2017. Supposed to be taking Synthroid 50mcg daily, but he has actually been taking both the Synthroid 25mcg and 50mcg daily (shows me both bottles), for a total of 75mcg daily (but he didn't realize these were both the same medication). He feels well. No heat/cold interance, palpitations, tremors, or fatigue.     He wonders if he can decrease the number of pills he takes each day, as he is concerned about taking so many meds. He also has had difficulty understanding which meds he is supposed to be taking. He thinks a home nurse would be helpful to assist with med management and knowing which meds he should take.     PMH, Medications and Allergies were reviewed and updated as needed.        REVIEW OF SYSTEMS     Pertinent positives and negatives noted in HPI.         OBJECTIVE     Vitals:    05/26/17 1422   BP: 115/76   Pulse: 75   Temp: 98.3  F (36.8  C)   TempSrc: Oral   Weight: 157 lb (71.2 kg)   Height: 5' 1\" (154.9 cm)     Body mass index is 29.66 kg/(m^2).    General: Alert, NAD  Neck: No cervical adenopathy, no thyromegaly  CV: RRR w/o m/r/g  Pulm: CTAB without wheezes or crackles, no increased WOB  Extremities: " No RAD  Psych: Pleasant mood, normal affect      No results found for this or any previous visit (from the past 24 hour(s)).    The 10-year ASCVD risk score ([place] SANDY Jr, et al., 2013) is: 44%    Values used to calculate the score:      Age: 77 years      Sex: Male      Is Non- : No      Diabetic: Yes      Tobacco smoker: No      Systolic Blood Pressure: 115 mmHg      Is BP treated: No      HDL Cholesterol: 44.4 mg/dL      Total Cholesterol: 206 mg/dL      ASSESSMENT AND PLAN       Gout of foot, unspecified cause, unspecified chronicity, unspecified laterality: Uric acid of 6.8, which is improved from 8 on last check. Gout well controlled without recent flares. Could consider increasing allopurinol to 200mg daily since uric acid still >6.0, but patient prefers to decrease his total number of pills. Since no recent flares, I feel this is reasonable, and will continue the allopurinol at current 100mg daily dose.     Hypothyroidism, unspecified type:  TSH normal on the 75mcg total daily dose. Asymptomatic. Told patient to stop the 25mcg and 50mcg pills and will order a new strength of 75mcg pills to take once daily. This will be one less pill to take. Will order home nurse visits, as he has had difficulty remembering which pills and dosing he should take.   -     levothyroxine (SYNTHROID/LEVOTHROID) 75 MCG tablet; Take 1 tablet (75 mcg) by mouth daily  -     HOME CARE REFERRAL; Future    Type 2 diabetes mellitus without complication, without long-term current use of insulin (H):  Last A1C of 7.0 in 8/2016. Not currently on any DM meds. Discussed working on lifestyle efforts over the next few months including diet, exercise, and weight loss. Advised to return in 2 months to recheck A1C. If not improved, should consider starting metformin. Advised to continue high intensity statin and ASA given ASCVD risk of 44%. BP well controlled on lisinopril so advised to continue this as well. He is agreeable  to this plan.       RTC in 2 months for follow up of DM or sooner if develops new or worsening symptoms.    Patient's plan of care was discussed with Dr. Lange.    Laura Saavedra MD PGY-3  Pager #: [phone]

## 2017-05-26 NOTE — MR AVS SNAPSHOT
After Visit Summary   2017    Rafy Riggins    MRN: [id number]           Patient Information     Date Of Birth          1940        Visit Information        Provider Department      2017 2:10 PM Laura Saavedra MD [facility]        Today's Diagnoses     Hypothyroidism, unspecified type          Care Instructions    Start levothyroxine  75mcg daily  Continue allopurinol at current dose  Return in about 2 months for diabetes visit          Follow-ups after your visit        Who to contact     Please call your clinic at [phone] to:    Ask questions about your health    Make or cancel appointments    Discuss your medicines    Learn about your test results    Speak to your doctor   If you have compliments or concerns about an experience at your clinic, or if you wish to file a complaint, please contact [facility] Patient Relations at [phone] or email us at [email or web address]         Additional Information About Your Visit        MyChart Information     Orlebar Brownt is an electronic gateway that provides easy, online access to your medical records. With [facility], you can request a clinic appointment, read your test results, renew a prescription or communicate with your care team.     To sign up for Orlebar Brownt visit the website at [email or web address]   You will be asked to enter the access code listed below, as well as some personal information. Please follow the directions to create your username and password.     Your access code is: [email or web address]  Expires: 2017  8:23 AM     Your access code will  in 90 days. If you need help or a new code, please contact your [facility] or call [phone] for assistance.        Care EveryWhere ID     This is your Care EveryWhere ID. This could be used by other organizations to access your [facility] medical records  [id number]        Your Vitals Were      "Pulse Temperature Height BMI (Body Mass Index)          75 98.3  F (36.8  C) (Oral) 5' 1\" (154.9 cm) 29.66 kg/m2         Blood Pressure from Last 3 Encounters:   05/26/17 115/76   04/12/17 143/81   03/01/17 139/73    Weight from Last 3 Encounters:   05/26/17 157 lb (71.2 kg)   04/12/17 154 lb 6 oz (70 kg)   03/01/17 160 lb (72.6 kg)              Today, you had the following     No orders found for display         Today's Medication Changes          These changes are accurate as of: 5/26/17  3:11 PM.  If you have any questions, ask your nurse or doctor.               These medicines have changed or have updated prescriptions.        Dose/Directions    levothyroxine 75 MCG tablet   Commonly known as:  SYNTHROID/LEVOTHROID   This may have changed:    - medication strength  - how much to take   Used for:  Hypothyroidism, unspecified type   Changed by:  Laura Saavedra MD        Dose:  75 mcg   Take 1 tablet (75 mcg) by mouth daily   Quantity:  90 tablet   Refills:  3            Where to get your medicines      These medications were sent to [facility] [id number] [place] [address] [address] [facility] [address]  [address] [facility] [address]    Hours:  24-hours Phone:  [phone]     levothyroxine 75 MCG tablet                Primary Care Provider Office Phone # Fax #    Romaine Blank [phone]       [address] [address]        Thank you!     Thank you for choosing [facility]  for your care. Our goal is always to provide you with excellent care. Hearing back from our patients is one way we can continue to improve our services. Please take a few minutes to complete the written survey that you may receive in the mail after your visit with us. Thank you!             Your Updated Medication List - Protect others around you: Learn how to safely use, store and throw away your medicines at www.disposemymeds.org.          This " list is accurate as of: 5/26/17  3:11 PM.  Always use your most recent med list.                   Brand Name Dispense Instructions for use    Adult Blood Pressure Cuff Lg Kit     1 kit    1 Device daily       allopurinol 100 MG tablet    ZYLOPRIM    30 tablet    Take 1 tablet (100 mg) by mouth daily       Ankle Stirrup Brace/Left Misc      Use as needed       aspirin 81 MG tablet     90 tablet    Take 1 tablet (81 mg) by mouth daily       atorvastatin 40 MG tablet    LIPITOR    90 tablet    Take 1 tablet (40 mg) by mouth daily       colchicine 0.6 MG tablet    COLCRYS    30 tablet    Take 1 tablet (0.6 mg) by mouth daily       gabapentin 100 MG capsule    NEURONTIN    60 capsule    Take 1 capsule (100 mg) by mouth 2 times daily       ibuprofen 200 MG capsule     120 capsule    Take 400 mg by mouth every 8 hours as needed for pain       levothyroxine 75 MCG tablet    SYNTHROID/LEVOTHROID    90 tablet    Take 1 tablet (75 mcg) by mouth daily       multivitamin, therapeutic with minerals Tabs tablet     100 tablet    Take 1 tablet by mouth daily       ranitidine 150 MG tablet    ZANTAC    60 tablet    Take 1 tablet (150 mg) by mouth 2 times daily as needed for heartburn

## 2017-05-26 NOTE — PATIENT INSTRUCTIONS
Start levothyroxine  75mcg daily  Continue allopurinol at current dose  Return in about 2 months for diabetes visit      Referral and notes have been sent to [facility]     They will reach out to patient to schedule this.    Phone: [phone]  Fax:[phone]    Teresa  05/31/17

## 2017-05-30 NOTE — PROGRESS NOTES
"Preceptor attestation:  Vital signs reviewed: /76  Pulse 75  Temp 98.3  F (36.8  C) (Oral)  Ht 5' 1\" (154.9 cm)  Wt 157 lb (71.2 kg)  BMI 29.66 kg/m2    Patient seen and discussed with the resident. Assessment and plan reviewed with resident and agreed upon.    Supervising physician: Lou Lange MD  [facility]    "

## 2017-06-01 ENCOUNTER — TELEPHONE (OUTPATIENT)
Dept: FAMILY MEDICINE | Facility: CLINIC | Age: 77
End: 2017-06-01

## 2017-06-02 DIAGNOSIS — Z00.00 ROUTINE GENERAL MEDICAL EXAMINATION AT A HEALTH CARE FACILITY: ICD-10-CM

## 2017-06-02 DIAGNOSIS — M1A.9XX0 CHRONIC GOUT WITHOUT TOPHUS, UNSPECIFIED CAUSE, UNSPECIFIED SITE: ICD-10-CM

## 2017-06-02 DIAGNOSIS — E03.9 HYPOTHYROIDISM, UNSPECIFIED TYPE: ICD-10-CM

## 2017-06-02 DIAGNOSIS — R68.89 FORGETFULNESS: ICD-10-CM

## 2017-06-02 RX ORDER — MULTIPLE VITAMINS W/ MINERALS TAB 9MG-400MCG
1 TAB ORAL DAILY
Qty: 100 TABLET | Refills: 3 | Status: SHIPPED | OUTPATIENT
Start: 2017-06-02 | End: 2018-06-22

## 2017-06-02 RX ORDER — OMEGA-3 FATTY ACIDS/FISH OIL 300-1000MG
400 CAPSULE ORAL EVERY 8 HOURS PRN
Qty: 120 CAPSULE | Refills: 0 | Status: SHIPPED | OUTPATIENT
Start: 2017-06-02 | End: 2017-11-27

## 2017-06-02 RX ORDER — LEVOTHYROXINE SODIUM 75 UG/1
75 TABLET ORAL DAILY
Qty: 90 TABLET | Refills: 3 | Status: SHIPPED | OUTPATIENT
Start: 2017-06-02 | End: 2018-04-05

## 2017-06-02 NOTE — TELEPHONE ENCOUNTER
In regards to the lisinopril refill request: Please call patient and ask if he has been taking lisinopril 20mg daily, as I see this was previously on his med list, but isn't now and his BP was normal at last visit.     Thanks,   Laura Saavedra

## 2017-06-02 NOTE — TELEPHONE ENCOUNTER
Pt. Also has refill request for Lisinopril 20 mg tablets (Not on patients medication list) please advise.

## 2017-06-05 ENCOUNTER — MEDICAL CORRESPONDENCE (OUTPATIENT)
Dept: HEALTH INFORMATION MANAGEMENT | Facility: CLINIC | Age: 77
End: 2017-06-05

## 2017-06-13 DIAGNOSIS — I10 ESSENTIAL HYPERTENSION WITH GOAL BLOOD PRESSURE LESS THAN 140/90: ICD-10-CM

## 2017-06-13 RX ORDER — LISINOPRIL 20 MG/1
20 TABLET ORAL DAILY
Qty: 90 TABLET | Refills: 3 | Status: SHIPPED | OUTPATIENT
Start: 2017-06-13 | End: 2018-04-05

## 2017-06-13 NOTE — TELEPHONE ENCOUNTER
Called and spoke with patient regarding Lisinopril, patient stated that he is still taking this medication everyday for his blood pressure.  His blood pressure is still high in the 130 to 150.  Please advise.

## 2017-06-13 NOTE — TELEPHONE ENCOUNTER
Please notify patient that I have refilled his lisinopril 20mg daily, sent to [facility] on [address]. If he is noting over half his systolic BP readings >140, he should return for a visit and we can discuss increasing his BP medication. He should bring his BP readings to his visit for me to review.     Thanks,   Laura Saavedra MD

## 2017-06-15 ENCOUNTER — MEDICAL CORRESPONDENCE (OUTPATIENT)
Dept: HEALTH INFORMATION MANAGEMENT | Facility: CLINIC | Age: 77
End: 2017-06-15

## 2017-06-16 ENCOUNTER — MEDICAL CORRESPONDENCE (OUTPATIENT)
Dept: HEALTH INFORMATION MANAGEMENT | Facility: CLINIC | Age: 77
End: 2017-06-16

## 2017-06-20 ENCOUNTER — MEDICAL CORRESPONDENCE (OUTPATIENT)
Dept: HEALTH INFORMATION MANAGEMENT | Facility: CLINIC | Age: 77
End: 2017-06-20

## 2017-06-29 ENCOUNTER — MEDICAL CORRESPONDENCE (OUTPATIENT)
Dept: HEALTH INFORMATION MANAGEMENT | Facility: CLINIC | Age: 77
End: 2017-06-29

## 2017-07-27 ENCOUNTER — MEDICAL CORRESPONDENCE (OUTPATIENT)
Dept: HEALTH INFORMATION MANAGEMENT | Facility: CLINIC | Age: 77
End: 2017-07-27

## 2017-08-02 ENCOUNTER — OFFICE VISIT (OUTPATIENT)
Dept: FAMILY MEDICINE | Facility: CLINIC | Age: 77
End: 2017-08-02

## 2017-08-02 VITALS
OXYGEN SATURATION: 98 % | HEART RATE: 69 BPM | BODY MASS INDEX: 29.07 KG/M2 | SYSTOLIC BLOOD PRESSURE: 129 MMHG | WEIGHT: 154 LBS | HEIGHT: 61 IN | TEMPERATURE: 98.3 F | DIASTOLIC BLOOD PRESSURE: 72 MMHG

## 2017-08-02 DIAGNOSIS — E78.5 HYPERLIPIDEMIA LDL GOAL <100: ICD-10-CM

## 2017-08-02 DIAGNOSIS — I10 ESSENTIAL HYPERTENSION: Primary | ICD-10-CM

## 2017-08-02 DIAGNOSIS — E11.9 TYPE 2 DIABETES MELLITUS WITHOUT COMPLICATION, WITHOUT LONG-TERM CURRENT USE OF INSULIN (H): Primary | ICD-10-CM

## 2017-08-02 LAB
CHOLEST SERPL-MCNC: 120 MG/DL
FASTING?: ABNORMAL
HBA1C MFR BLD: 8.6 % (ref 4.1–5.7)
HDLC SERPL-MCNC: 35 MG/DL
LDLC SERPL CALC-MCNC: 63 MG/DL
TRIGL SERPL-MCNC: 111 MG/DL
TSH SERPL DL<=0.05 MIU/L-ACNC: 0.63 UIU/ML (ref 0.3–5)

## 2017-08-02 NOTE — MR AVS SNAPSHOT
After Visit Summary   2017    Rafy Riggins    MRN: [id number]           Patient Information     Date Of Birth          1940        Visit Information        Provider Department      2017 10:40 AM Francisco Amaya MD [facility]        Today's Diagnoses     Type 2 diabetes mellitus without complication, without long-term current use of insulin (H)    -  1      Care Instructions    -Follow-up in one week to speak about lab results.          Follow-ups after your visit        Future tests that were ordered for you today     Open Future Orders        Priority Expected Expires Ordered    Basic Metabolic Panel ([place]) Routine  2017            Who to contact     Please call your clinic at [phone] to:    Ask questions about your health    Make or cancel appointments    Discuss your medicines    Learn about your test results    Speak to your doctor   If you have compliments or concerns about an experience at your clinic, or if you wish to file a complaint, please contact [facility] Patient Relations at [phone] or email us at [email or web address]         Additional Information About Your Visit        MyChart Information     ThriveHive is an electronic gateway that provides easy, online access to your medical records. With ThriveHive, you can request a clinic appointment, read your test results, renew a prescription or communicate with your care team.     To sign up for ThriveHive visit the website at [email or web address]   You will be asked to enter the access code listed below, as well as some personal information. Please follow the directions to create your username and password.     Your access code is: [email or web address]  Expires: 10/31/2017 12:12 PM     Your access code will  in 90 days. If you need help or a new code, please contact your [facility] or call [phone] for assistance.    "     Care EveryWhere ID     This is your Care EveryWhere ID. This could be used by other organizations to access your [place] medical records  [id number]        Your Vitals Were     Pulse Temperature Height Pulse Oximetry BMI (Body Mass Index)       69 98.3  F (36.8  C) (Oral) 5' 1\" (154.9 cm) 98% 29.1 kg/m2        Blood Pressure from Last 3 Encounters:   08/02/17 129/72   05/26/17 115/76   04/12/17 143/81    Weight from Last 3 Encounters:   08/02/17 154 lb (69.9 kg)   05/26/17 157 lb (71.2 kg)   04/12/17 154 lb 6 oz (70 kg)              We Performed the Following     Hemoglobin A1c ([facility] )     Lipid [place] ([facility]) - Results > 1hr     Microalbumin Random Ur ([facility])     Thyroid [place] ([facility])        Primary Care Provider Office Phone # Fax #    Francisco Saeed Amaya [phone]       [facility] MEDICINE [address] [place] [address]        Equal Access to Services     GARETH JENSEN : Hadii aad ku hadasho Soomaali, waaxda luqadaha, qaybta kaalmada adeegyada, luis m doe hayshine stafford . So [facility] [place] [phone].    ATENCIÓN: Si habla español, tiene a hughes disposición servicios gratuitos de asistencia lingüística. Llame al [phone].    We comply with applicable federal civil rights laws and [place] laws. We do not discriminate on the basis of race, color, national origin, age, disability sex, sexual orientation or gender identity.            Thank you!     Thank you for choosing [facility]  for your care. Our goal is always to provide you with excellent care. Hearing back from our patients is one way we can continue to improve our services. Please take a few minutes to complete the written survey that you may receive in the mail after your visit with us. Thank you!             Your Updated Medication List - Protect others around you: Learn how to safely use, store and throw away your medicines at www.disposemymeds.org.          This list is accurate as of: " 8/2/17  1:42 PM.  Always use your most recent med list.                   Brand Name Dispense Instructions for use Diagnosis    Adult Blood Pressure Cuff Lg Kit     1 kit    1 Device daily    Essential hypertension with goal blood pressure less than 140/90       allopurinol 100 MG tablet    ZYLOPRIM    30 tablet    Take 1 tablet (100 mg) by mouth daily    Chronic gout without tophus, unspecified cause, unspecified site       Ankle Stirrup Brace/Left Misc      Use as needed    Acute left ankle pain       aspirin 81 MG tablet     90 tablet    Take 1 tablet (81 mg) by mouth daily    Routine general medical examination at a health care facility       atorvastatin 40 MG tablet    LIPITOR    90 tablet    Take 1 tablet (40 mg) by mouth daily    Hyperlipidemia LDL goal <100       colchicine 0.6 MG tablet    COLCRYS    30 tablet    Take 1 tablet (0.6 mg) by mouth daily    Chronic gout without tophus, unspecified cause, unspecified site       gabapentin 100 MG capsule    NEURONTIN    60 capsule    Take 1 capsule (100 mg) by mouth 2 times daily    Neuropathy (H)       ibuprofen 200 MG capsule     120 capsule    Take 400 mg by mouth every 8 hours as needed for pain    Chronic gout without tophus, unspecified cause, unspecified site       levothyroxine 75 MCG tablet    SYNTHROID/LEVOTHROID    90 tablet    Take 1 tablet (75 mcg) by mouth daily    Hypothyroidism, unspecified type       lisinopril 20 MG tablet    PRINIVIL/ZESTRIL    90 tablet    Take 1 tablet (20 mg) by mouth daily    Essential hypertension with goal blood pressure less than 140/90       multivitamin, therapeutic with minerals Tabs tablet     100 tablet    Take 1 tablet by mouth daily    Forgetfulness       ranitidine 150 MG tablet    ZANTAC    60 tablet    Take 1 tablet (150 mg) by mouth 2 times daily as needed for heartburn    Gastroesophageal reflux disease, esophagitis presence not specified

## 2017-08-02 NOTE — PROGRESS NOTES
"  ASSESSMENT AND PLAN      Rafy was seen today for recheck.    Diagnoses and all orders for this visit:    Type 2 diabetes mellitus without complication, without long-term current use of insulin (H)  -     Hemoglobin A1c ([facility])  -     Cancel: Lipid Panel ([place])  -     Cancel: Microalbumin [place] ([facility])  -     Lipid [place] ([facility]) - Results > 1hr  -     Thyroid [place] ([facility])        RTC in 1 week for follow up of labs or sooner if develops new or worsening symptoms.    Francisco Amaya MD PGY2  [facility]                SUBJECTIVE       Rafy Riggins is a 77 year old  male with a PMH significant for:     Patient Active Problem List   Diagnosis     HTN (hypertension)     Back pain     Poor vision     S/P lumbar spinal fusion     Colon polyp     Diabetes mellitus, type 2 (H)     Obesity     Lumbosacral radiculopathy at S1     Gout of foot     Hypothyroidism     He presents for diabetes follow-up. Lightheaded, dizzy, dull headache at times. Has been happening for \"a long time\" Happens throughout the day, every day. Sometimes better, sometimes worse. Also noting increasing forgetfulness for the last two years. He does note intermittent nocturia. He denies any numbness or tingling in his feet. His past hemoglobin a1c in 8/14/19 was 7.1%. He does attempts to control his blood sugars with diet.     Patient denies smoking, alcohol use, or drug use.     PMH, Medications and Allergies were reviewed and updated as needed.        REVIEW OF SYSTEMS     CONSTITUTIONAL: NEGATIVE for fever, chills, change in weight  EYES: NEGATIVE for vision changes or irritation  RESP: NEGATIVE for significant cough or SOB  CV: NEGATIVE for chest pain, palpitations or peripheral edema  GI: NEGATIVE for nausea, abdominal pain, heartburn, or change in bowel habits        OBJECTIVE     Vitals:    08/02/17 1115   BP: 129/72   Pulse: 69   Temp: 98.3  F (36.8  C)   TempSrc: Oral   SpO2: 98%   Weight: 154 lb " "(69.9 kg)   Height: 5' 1\" (154.9 cm)     Body mass index is 29.1 kg/(m^2).    Constitutional: Awake, alert, cooperative, no apparent distress, and appears stated age.  Neck: Supple, symmetrical, trachea midline, no adenopathy, thyroid symmetric, not enlarged and no tenderness, skin normal.  Lungs: No increased work of breathing, good air exchange, clear to auscultation bilaterally, no crackles or wheezing.  Cardiovascular: Regular rate and rhythm, normal S1 and S2, no S3 or S4, and no murmur noted.  Abdomen: No scars, normal bowel sounds, soft, non-distended, non-tender, no masses palpated, no hepatosplenomegally.  Skin: No rashes, erythema, pallor, petechia or purpura.    No results found for this or any previous visit (from the past 24 hour(s)).          "

## 2017-08-02 NOTE — PROGRESS NOTES
Preceptor attestation:  Patient seen and discussed with the resident. Assessment and plan reviewed with resident and agreed upon.  Supervising physician: Leo James  [facility]

## 2017-08-04 ENCOUNTER — MEDICAL CORRESPONDENCE (OUTPATIENT)
Dept: HEALTH INFORMATION MANAGEMENT | Facility: CLINIC | Age: 77
End: 2017-08-04

## 2017-08-07 RX ORDER — ATORVASTATIN CALCIUM 40 MG/1
40 TABLET, FILM COATED ORAL DAILY
Qty: 90 TABLET | Refills: 1 | Status: SHIPPED | OUTPATIENT
Start: 2017-08-07 | End: 2018-02-07

## 2017-08-07 NOTE — PROGRESS NOTES
Please have  call the patient to inform him of the following:    Christiano Hillman,     The results of your labs have returned. Your cholesterol and thyroid are normal, but your hemoglobin A1C has increased to 8.6% from 7.1%. You should call the clinic to schedule a follow-up appointment to discuss further treatment of your diabetes.    Francisco Amaya MD PGY2  [facility]

## 2017-08-09 ENCOUNTER — TELEPHONE (OUTPATIENT)
Dept: FAMILY MEDICINE | Facility: CLINIC | Age: 77
End: 2017-08-09

## 2017-08-09 NOTE — TELEPHONE ENCOUNTER
Called patient using [employer], left a message for patient with lab results and also to call clinic to make an appointment to discus further details. Relayed message to patient's son.

## 2017-08-17 ENCOUNTER — TELEPHONE (OUTPATIENT)
Dept: FAMILY MEDICINE | Facility: CLINIC | Age: 77
End: 2017-08-17

## 2017-08-17 NOTE — TELEPHONE ENCOUNTER
[facility] phone call message- patient requesting a refill:    Full Medication Name: DOCQLACE     Dose: 100 mg      Pharmacy confirmed as   [id number] - [place], [place] - [address]  [address] [place] [address]  Phone: [phone] Fax: [phone]    [id number] - [place], [place] - [address] [facility] [address]  [address] [place] [address]  Phone: [phone] Fax: [phone]    [facility] [place], [place] - [address] [facility] [address]  [address]  [facility] [address]  Phone: [phone] Fax: [phone]  : Yes    Additional Comments: send to [employer]      OK to leave a message on voice mail? Yes    Primary language: Lao      needed? Yes    Call taken on August 17, 2017 at 10:51 AM by Hal Bhat

## 2017-08-18 ENCOUNTER — OFFICE VISIT (OUTPATIENT)
Dept: FAMILY MEDICINE | Facility: CLINIC | Age: 77
End: 2017-08-18

## 2017-08-18 VITALS
SYSTOLIC BLOOD PRESSURE: 118 MMHG | WEIGHT: 157 LBS | HEART RATE: 87 BPM | BODY MASS INDEX: 29.66 KG/M2 | DIASTOLIC BLOOD PRESSURE: 72 MMHG

## 2017-08-18 DIAGNOSIS — E11.9 TYPE 2 DIABETES MELLITUS WITHOUT COMPLICATION, WITHOUT LONG-TERM CURRENT USE OF INSULIN (H): Primary | ICD-10-CM

## 2017-08-18 LAB
BUN SERPL-MCNC: 11.1 MG/DL (ref 7–21)
CALCIUM SERPL-MCNC: 9.6 MG/DL (ref 8.5–10.1)
CHLORIDE SERPLBLD-SCNC: 103 MMOL/L (ref 98–110)
CO2 SERPL-SCNC: 23.9 MMOL/L (ref 20–32)
CREAT SERPL-MCNC: 1.1 MG/DL (ref 0.7–1.3)
CREAT UR-MCNC: 46.8 MG/DL
GFR SERPL CREATININE-BSD FRML MDRD: 68.8 ML/MIN/1.7 M2
GLUCOSE SERPL-MCNC: 227 MG'DL (ref 70–99)
MICROALBUMIN UR-MCNC: <0.5 MG/DL (ref 0–1.99)
MICROALBUMIN/CREAT UR: NORMAL MG/G
POTASSIUM SERPL-SCNC: 4.5 MMOL/DL (ref 3.2–4.6)
SODIUM SERPL-SCNC: 136.8 MMOL/L (ref 132–142)

## 2017-08-18 NOTE — PROGRESS NOTES
ASSESSMENT AND PLAN      Rafy was seen today for diabetes.    Diagnoses and all orders for this visit:    Type 2 diabetes mellitus without complication, without long-term current use of insulin (H): His current hgba1c is 8.6%. Spoke to him regarding initiating therapy with metformin, but he wishes to pursue lifestyle modifications prior to adding to his current pill burden. We discussed that lifestyle modification alone is unlikely to bring him under 7%, but he wishes to try and will consider adding oral agents if hgba1c continues to be elevated past goal in 3 months.    -     Microalbumin Random Ur ([facility])  -     Basic Metabolic Panel ([place])        RTC in 3 months for follow up of diabetes or sooner if develops new or worsening symptoms.    Francisco Amaya MD PGY2  [facility]                SUBJECTIVE       Rafy Riggins is a 77 year old  male with a PMH significant for:     Patient Active Problem List   Diagnosis     HTN (hypertension)     Back pain     Poor vision     S/P lumbar spinal fusion     Colon polyp     Diabetes mellitus, type 2 (H)     Obesity     Lumbosacral radiculopathy at S1     Gout of foot     Hypothyroidism     He presents for follow-up of laboratory results. Patient has had history of diet controlled type 2 diabetes, and was last seen on 8/2 for follow-up of his diabetes. Repeat hgba1c testing at that time showed an elevation to 8.6% from 7.1% on 8/12/16. He continues to have frequent night time urination. He does not notice any vision changes at this time. No current headache.     He does not wish to be started on any new medications at this time. He notes that he is already on a lot of medications and wishes to try to avoid more at this time. We spoke at length about diet modification including reducing carbohydrates, nutritional tips to lose weight such as avoiding water 30 mins before meals, eating protein first, and avoiding sugary beverages. He notes that he  has begun to walk 3-4 days a week since his last visit, encouraged him to increase that to 30 mins a day if possible.     PMH, Medications and Allergies were reviewed and updated as needed.        REVIEW OF SYSTEMS     CONSTITUTIONAL: NEGATIVE for fever, chills, change in weight  RESP: NEGATIVE for significant cough or SOB  CV: NEGATIVE for chest pain, palpitations or peripheral edema  GI: NEGATIVE for nausea, abdominal pain, heartburn, or change in bowel habits         OBJECTIVE     Vitals:    08/18/17 1517   BP: 118/72   Pulse: 87   Weight: 157 lb (71.2 kg)     Body mass index is 29.66 kg/(m^2).    Constitutional: Awake, alert, cooperative, no apparent distress, and appears stated age.  Neck: Supple, symmetrical, trachea midline, no adenopathy, thyroid symmetric, not enlarged and no tenderness, skin normal.  Lungs: No increased work of breathing, good air exchange, clear to auscultation bilaterally, no crackles or wheezing.  Cardiovascular: Regular rate and rhythm, normal S1 and S2, no S3 or S4, and no murmur noted.  Abdomen: No scars, normal bowel sounds, soft, non-distended, non-tender, no masses palpated, no hepatosplenomegally.    No results found for this or any previous visit (from the past 24 hour(s)).

## 2017-08-18 NOTE — MR AVS SNAPSHOT
After Visit Summary   2017    Rafy Riggins    MRN: [id number]           Patient Information     Date Of Birth          1940        Visit Information        Provider Department      2017 3:30 PM Francisco Amaya MD [facility]        Today's Diagnoses     Type 2 diabetes mellitus without complication, without long-term current use of insulin (H)    -  1      Care Instructions    -Follow-up in 3 months for repeat [id number]          Follow-ups after your visit        Who to contact     Please call your clinic at [phone] to:    Ask questions about your health    Make or cancel appointments    Discuss your medicines    Learn about your test results    Speak to your doctor   If you have compliments or concerns about an experience at your clinic, or if you wish to file a complaint, please contact [facility] Patient Relations at [phone] or email us at [email or web address]         Additional Information About Your Visit        MyChart Information     Intentt is an electronic gateway that provides easy, online access to your medical records. With Blinkit, you can request a clinic appointment, read your test results, renew a prescription or communicate with your care team.     To sign up for Intentt visit the website at [email or web address]   You will be asked to enter the access code listed below, as well as some personal information. Please follow the directions to create your username and password.     Your access code is: [email or web address]  Expires: 10/31/2017 12:12 PM     Your access code will  in 90 days. If you need help or a new code, please contact your [facility] or call [phone] for assistance.        Care EveryWhere ID     This is your Care EveryWhere ID. This could be used by other organizations to access your [place] medical records  [id number]        Your Vitals Were      Pulse BMI (Body Mass Index)                87 29.66 kg/m2           Blood Pressure from Last 3 Encounters:   08/18/17 118/72   08/02/17 129/72   05/26/17 115/76    Weight from Last 3 Encounters:   08/18/17 157 lb (71.2 kg)   08/02/17 154 lb (69.9 kg)   05/26/17 157 lb (71.2 kg)              Today, you had the following     No orders found for display       Primary Care Provider Office Phone # Fax #    Francisco Saeed Amaya [phone]       [facility] [address] [place] [address]        Equal Access to Services     [facility] : Hadii derek carbajal Solópez, waaxda lulaxmiadaha, qaybta kaalmada nick, luis m stafford . So [facility] [phone].    ATENCIÓN: Si habla español, tiene a hughes disposición servicios gratuitos de asistencia lingüística. Llame al [phone].    We comply with applicable federal civil rights laws and [place] laws. We do not discriminate on the basis of race, color, national origin, age, disability sex, sexual orientation or gender identity.            Thank you!     Thank you for choosing [facility]  for your care. Our goal is always to provide you with excellent care. Hearing back from our patients is one way we can continue to improve our services. Please take a few minutes to complete the written survey that you may receive in the mail after your visit with us. Thank you!             Your Updated Medication List - Protect others around you: Learn how to safely use, store and throw away your medicines at www.disposemymeds.org.          This list is accurate as of: 8/18/17  4:00 PM.  Always use your most recent med list.                   Brand Name Dispense Instructions for use Diagnosis    Adult Blood Pressure Cuff Lg Kit     1 kit    1 Device daily    Essential hypertension with goal blood pressure less than 140/90       allopurinol 100 MG tablet    ZYLOPRIM    30 tablet    Take 1 tablet (100 mg) by mouth daily    Chronic  gout without tophus, unspecified cause, unspecified site       Ankle Stirrup Brace/Left Misc      Use as needed    Acute left ankle pain       aspirin 81 MG tablet     90 tablet    Take 1 tablet (81 mg) by mouth daily    Routine general medical examination at a health care facility       atorvastatin 40 MG tablet    LIPITOR    90 tablet    Take 1 tablet (40 mg) by mouth daily    Hyperlipidemia LDL goal <100       colchicine 0.6 MG tablet    COLCRYS    30 tablet    Take 1 tablet (0.6 mg) by mouth daily    Chronic gout without tophus, unspecified cause, unspecified site       gabapentin 100 MG capsule    NEURONTIN    60 capsule    Take 1 capsule (100 mg) by mouth 2 times daily    Neuropathy (H)       ibuprofen 200 MG capsule     120 capsule    Take 400 mg by mouth every 8 hours as needed for pain    Chronic gout without tophus, unspecified cause, unspecified site       levothyroxine 75 MCG tablet    SYNTHROID/LEVOTHROID    90 tablet    Take 1 tablet (75 mcg) by mouth daily    Hypothyroidism, unspecified type       lisinopril 20 MG tablet    PRINIVIL/ZESTRIL    90 tablet    Take 1 tablet (20 mg) by mouth daily    Essential hypertension with goal blood pressure less than 140/90       multivitamin, therapeutic with minerals Tabs tablet     100 tablet    Take 1 tablet by mouth daily    Forgetfulness       ranitidine 150 MG tablet    ZANTAC    60 tablet    Take 1 tablet (150 mg) by mouth 2 times daily as needed for heartburn    Gastroesophageal reflux disease, esophagitis presence not specified

## 2017-08-18 NOTE — PROGRESS NOTES
Preceptor attestation:  Patient seen and discussed with the resident. Assessment and plan reviewed with resident and agreed upon.  Supervising physician: Braeden Slaughter  [facility]

## 2017-08-24 ENCOUNTER — MEDICAL CORRESPONDENCE (OUTPATIENT)
Dept: HEALTH INFORMATION MANAGEMENT | Facility: CLINIC | Age: 77
End: 2017-08-24

## 2017-08-26 NOTE — PROGRESS NOTES
name: Rick Vázquez  Language: Korean  Agency: [employer]  Phone number: [phone]    Please have Rick call Rafy to let him know of the following results.     Hi Rafy,    The results of your blood and urine testing is back. They are both normal and do not indicate that you have any kidney damage. If you have any questions, please call the clinic.    Thank you,    Francisco Amaya MD PGY2  [facility]

## 2017-09-05 DIAGNOSIS — K21.9 GASTROESOPHAGEAL REFLUX DISEASE, ESOPHAGITIS PRESENCE NOT SPECIFIED: ICD-10-CM

## 2017-09-15 ENCOUNTER — MEDICAL CORRESPONDENCE (OUTPATIENT)
Dept: HEALTH INFORMATION MANAGEMENT | Facility: CLINIC | Age: 77
End: 2017-09-15

## 2017-10-03 ENCOUNTER — MEDICAL CORRESPONDENCE (OUTPATIENT)
Dept: HEALTH INFORMATION MANAGEMENT | Facility: CLINIC | Age: 77
End: 2017-10-03

## 2017-10-23 ENCOUNTER — MEDICAL CORRESPONDENCE (OUTPATIENT)
Dept: HEALTH INFORMATION MANAGEMENT | Facility: CLINIC | Age: 77
End: 2017-10-23

## 2017-11-03 DIAGNOSIS — G62.9 NEUROPATHY: ICD-10-CM

## 2017-11-03 RX ORDER — GABAPENTIN 100 MG/1
100 CAPSULE ORAL 2 TIMES DAILY
Qty: 60 CAPSULE | Refills: 3 | Status: SHIPPED | OUTPATIENT
Start: 2017-11-03 | End: 2017-12-11

## 2017-11-15 ENCOUNTER — MEDICAL CORRESPONDENCE (OUTPATIENT)
Dept: HEALTH INFORMATION MANAGEMENT | Facility: CLINIC | Age: 77
End: 2017-11-15

## 2017-11-27 ENCOUNTER — OFFICE VISIT (OUTPATIENT)
Dept: FAMILY MEDICINE | Facility: CLINIC | Age: 77
End: 2017-11-27

## 2017-11-27 VITALS
DIASTOLIC BLOOD PRESSURE: 73 MMHG | SYSTOLIC BLOOD PRESSURE: 145 MMHG | HEART RATE: 76 BPM | BODY MASS INDEX: 29.29 KG/M2 | WEIGHT: 155 LBS | OXYGEN SATURATION: 98 % | TEMPERATURE: 98.3 F

## 2017-11-27 DIAGNOSIS — M10.9 ACUTE GOUTY ARTHRITIS: Primary | ICD-10-CM

## 2017-11-27 DIAGNOSIS — M1A.9XX0 CHRONIC GOUT WITHOUT TOPHUS, UNSPECIFIED CAUSE, UNSPECIFIED SITE: ICD-10-CM

## 2017-11-27 DIAGNOSIS — R05.9 COUGH: ICD-10-CM

## 2017-11-27 DIAGNOSIS — Z00.00 ROUTINE GENERAL MEDICAL EXAMINATION AT A HEALTH CARE FACILITY: ICD-10-CM

## 2017-11-27 DIAGNOSIS — E11.9 TYPE 2 DIABETES MELLITUS WITHOUT COMPLICATION, WITHOUT LONG-TERM CURRENT USE OF INSULIN (H): ICD-10-CM

## 2017-11-27 LAB — HBA1C MFR BLD: 8.2 % (ref 4.1–5.7)

## 2017-11-27 RX ORDER — OMEGA-3 FATTY ACIDS/FISH OIL 300-1000MG
400 CAPSULE ORAL EVERY 8 HOURS PRN
Qty: 120 CAPSULE | Refills: 0 | Status: SHIPPED | OUTPATIENT
Start: 2017-11-27 | End: 2018-06-01

## 2017-11-27 RX ORDER — BENZONATATE 100 MG/1
100 CAPSULE ORAL 3 TIMES DAILY PRN
Qty: 42 CAPSULE | Refills: 0 | Status: SHIPPED | OUTPATIENT
Start: 2017-11-27 | End: 2018-05-07

## 2017-11-27 RX ORDER — PREDNISONE 20 MG/1
40 TABLET ORAL DAILY
Qty: 10 TABLET | Refills: 0 | Status: SHIPPED | OUTPATIENT
Start: 2017-11-27 | End: 2017-12-02

## 2017-11-27 ASSESSMENT — ANXIETY QUESTIONNAIRES
GAD7 TOTAL SCORE: 7
2. NOT BEING ABLE TO STOP OR CONTROL WORRYING: SEVERAL DAYS
5. BEING SO RESTLESS THAT IT IS HARD TO SIT STILL: SEVERAL DAYS
1. FEELING NERVOUS, ANXIOUS, OR ON EDGE: SEVERAL DAYS
4. TROUBLE RELAXING: SEVERAL DAYS
6. BECOMING EASILY ANNOYED OR IRRITABLE: SEVERAL DAYS
3. WORRYING TOO MUCH ABOUT DIFFERENT THINGS: SEVERAL DAYS
7. FEELING AFRAID AS IF SOMETHING AWFUL MIGHT HAPPEN: SEVERAL DAYS

## 2017-11-27 ASSESSMENT — PATIENT HEALTH QUESTIONNAIRE - PHQ9: SUM OF ALL RESPONSES TO PHQ QUESTIONS 1-9: 14

## 2017-11-27 NOTE — PROGRESS NOTES
"[address] [address]  [phone]         SUBJECTIVE       Rafy Riggins is a 77 year old  male with a PMH significant for:     Patient Active Problem List   Diagnosis     HTN (hypertension)     Back pain     Poor vision     S/P lumbar spinal fusion     Colon polyp     Diabetes mellitus, type 2 (H)     Obesity     Lumbosacral radiculopathy at S1     Gout of foot     Hypothyroidism     He presents with left foot pain.    -Left foot has been painful for the last 5 to 6 days. Initially it was on the toes and now the ankle. He has not taken any medication for the pain specifically. He has a history of gout on allopurinol. Has not been taking colchicine for several months now. He usually gets gout flare in his foot and elbow. Last flare up was 5 months ago. Additionally, he has a history of spinal fusion in 2014 and has radiculopathy on gabapentin 100 mg BID for paresthesias in his right foot, which he reports is stable. No injury or trauma. No drainage in the foot.     -Diabetes: Last A1c was 8.6 in August 2017. Currently diet controlled. He exercises 30 minutes a day. He is eating vegetables, rice, and bread. He denies any polyuria, polydipsia, or polyphagia.     -Medication set up: He previously had a home RN set up medications but stopped recently due to insurance lapse. He is currently setting up his own medications which is going \"ok\" but due to language barrier it has been difficult and he would like to resume home care.     -Cough: Several days of dry cough. No fevers or chills. No recent travel or sick contacts.       Past Medical History:  Past Medical History:   Diagnosis Date     Diabetes mellitus, type 2 (H) 8/3/2015     Gout of foot 3/2/2017     Hypertension      Hypothyroidism 3/2/2017       Past Surgical History:  History reviewed. No pertinent surgical history.    Family History:  Family History   Problem Relation Age of Onset     CANCER Brother      DIABETES No " family hx of      Coronary Artery Disease No family hx of      Hypertension No family hx of      Hyperlipidemia No family hx of      CEREBROVASCULAR DISEASE No family hx of      Breast Cancer No family hx of      Colon Cancer No family hx of      Prostate Cancer No family hx of      Other Cancer No family hx of      Depression No family hx of      Anxiety Disorder No family hx of      MENTAL ILLNESS No family hx of      Substance Abuse No family hx of      Anesthesia Reaction No family hx of      Asthma No family hx of      OSTEOPOROSIS No family hx of      Genetic Disorder No family hx of      Thyroid Disease No family hx of      Obesity No family hx of      Unknown/Adopted No family hx of        Social History:  Social History     Social History     Marital status:      Spouse name: N/A     Number of children: N/A     Years of education: N/A     Occupational History     Not on file.     Social History Main Topics     Smoking status: Never Smoker     Smokeless tobacco: Never Used     Alcohol use No     Drug use: No     Sexual activity: Not on file     Other Topics Concern     Not on file     Social History Narrative       Medications:   Current Outpatient Prescriptions   Medication Sig Dispense Refill     ibuprofen 200 MG capsule Take 400 mg by mouth every 8 hours as needed for pain 120 capsule 0     aspirin 81 MG tablet Take 1 tablet (81 mg) by mouth daily 90 tablet 3     predniSONE (DELTASONE) 20 MG tablet Take 2 tablets (40 mg) by mouth daily for 5 days 10 tablet 0     benzonatate (TESSALON) 100 MG capsule Take 1 capsule (100 mg) by mouth 3 times daily as needed for cough 42 capsule 0     gabapentin (NEURONTIN) 100 MG capsule Take 1 capsule (100 mg) by mouth 2 times daily 60 capsule 3     ranitidine (ZANTAC) 150 MG tablet Take 1 tablet (150 mg) by mouth 2 times daily as needed for heartburn 60 tablet 3     atorvastatin (LIPITOR) 40 MG tablet Take 1 tablet (40 mg) by mouth daily 90 tablet 1     lisinopril  (PRINIVIL/ZESTRIL) 20 MG tablet Take 1 tablet (20 mg) by mouth daily 90 tablet 3     multivitamin, therapeutic with minerals (MULTI-VITAMIN) TABS tablet Take 1 tablet by mouth daily 100 tablet 3     levothyroxine (SYNTHROID/LEVOTHROID) 75 MCG tablet Take 1 tablet (75 mcg) by mouth daily 90 tablet 3     allopurinol (ZYLOPRIM) 100 MG tablet Take 1 tablet (100 mg) by mouth daily 30 tablet 11     Elastic Bandages & Supports (ANKLE STIRRUP BRACE/LEFT) MISC Use as needed       Blood Pressure Monitoring (ADULT BLOOD PRESSURE CUFF LG) KIT 1 Device daily 1 kit 0       Allergies:     Allergies   Allergen Reactions     Nka [No Known Allergies]        PMH, Surgical Hx, Family Hx, Social Hx, Medications and Allergies were reviewed and updated as needed in Epic.        REVIEW OF SYSTEMS     Please see HPI        OBJECTIVE     Vitals:    11/27/17 0916 11/27/17 0918   BP: 152/79 145/73   Pulse: 76    Temp: 98.3  F (36.8  C)    TempSrc: Oral    SpO2: 98%    Weight: 155 lb (70.3 kg)      Body mass index is 29.29 kg/(m^2).    Constitutional: Awake, alert, cooperative, elderly [place] male in no apparent distress, and appears stated age. Uses cane to help walk. Seen with a professional [place] .   Eyes: Lids and lashes normal, pupils equal, round and reactive to light, extra ocular muscles intact, sclera clear, conjunctiva normal.  ENT: Normocephalic, without obvious abnormality, atramatic, sinuses nontender on palpation, nares with small amounts of clear discharge, external ears without lesions, oral pharynx with moist mucus membranes, tonsils without erythema or exudates, fair dentition  Neck: Supple, symmetrical  Lungs: No increased work of breathing on room air, good air exchange, clear to auscultation bilaterally, no crackles or wheezing.  Cardiovascular: Regular rate and rhythm, normal S1 and S2, no S3 or S4, and no murmur noted.  Musculoskeletal: left first metatarsal and left malleolus tender, erythematous and warm.  Dorsalis pedis pulse 2+ bilaterally. No ulcerations on feet. Sensation and motion intact.  Neurologic: Awake, alert, oriented to name, place and time.  Cranial nerves II-XII are grossly intact.  Uses cane to help walk.  Neuropsychiatric: Normal affect, mood, orientation, memory and insight.    Results for orders placed or performed in visit on 11/27/17 (from the past 24 hour(s))   Hemoglobin A1c ([facility])   Result Value Ref Range    Hemoglobin A1C 8.2 (H) 4.1 - 5.7 %       ASSESSMENT AND PLAN     Rafy Riggins is a 77 year old  male with a PMH significant for gout, HTN, hypothyroidism, and DM type 2 who presents for left foot pain.    1. Routine general medical examination at a health care facility: Refilled ASA. Put in new referral for home care with [facility] for resumption of services now that his insurance is reinstated. He needs help with medication management and set up due to language barrier.  - aspirin 81 MG tablet; Take 1 tablet (81 mg) by mouth daily  Dispense: 90 tablet; Refill: 3  - HOME CARE REFERRAL; Future    2. Acute gouty arthritis: Exam consistent with gout flare up of left foot. Last flare 5 months ago. Will treat with steroid burst and refilled his ibuprofen prn for pain afterwards.  - predniSONE (DELTASONE) 20 MG tablet; Take 2 tablets (40 mg) by mouth daily for 5 days  Dispense: 10 tablet; Refill: 0  - ibuprofen 200 MG capsule; Take 400 mg by mouth every 8 hours as needed for pain  Dispense: 120 capsule; Refill: 0  -Continue allopurinol at same dose Could consider titrating up in the future if he has more frequent flare ups  -Colchicine removed from medication list as he is not taking it    3. Type 2 diabetes mellitus without complication, without long-term current use of insulin (H): A1c improved slightly from 8.6 but still elevated at 8.2. Given still elevated despite lifestyle modifications, results letter sent to have patient return in a month to discuss initiation of  medication.  - Hemoglobin A1c ([facility]): 8.2, results letter sent per patient request.    4. Cough: likely due to viral URI. Normal oxygenation and lung exam. Will symptomatically treat. Discussed signs and symptoms that would need re-evaluation.  - benzonatate (TESSALON) 100 MG capsule; Take 1 capsule (100 mg) by mouth 3 times daily as needed for cough  Dispense: 42 capsule; Refill: 0      RTC in 1 month for follow up of DM or sooner if develops new or worsening symptoms. Will need flu shot at next visit. Patient left before flu shot could be given.    Options for treatment and/or follow-up care were reviewed with the patient who was engaged and actively involved in the decision making process and verbalized understanding of the options discussed and was satisfied with the final plan.    Johana Reyes MD PGY-3  [facility]  Pager: [phone]    Patient discussed with Dr. Braeden Slaughter, attending physician, who agrees with the plan.

## 2017-11-27 NOTE — MR AVS SNAPSHOT
After Visit Summary   11/27/2017    Rafy Riggins    MRN: [id number]           Patient Information     Date Of Birth          1940        Visit Information        Provider Department      11/27/2017 9:00 AM Johana Reyes MD [facility]        Today's Diagnoses     Acute gouty arthritis    -  1    Chronic gout without tophus, unspecified cause, unspecified site        Routine general medical examination at a health care facility        Type 2 diabetes mellitus without complication, without long-term current use of insulin (H)          Care Instructions    -Take prednsione (steroid) 40 mg daily for 5 days to help with left foot pain/gout  -Refilled ibuprofen for as needed for pain  -Refilled aspirin 81 mg daily for heart health  -No other changes to medications  -Check A1c (diabetes blood sugar level)          Follow-ups after your visit        Follow-up notes from your care team     Return in about 3 months (around 2/27/2018) for Diabetes Follow Up.      Who to contact     Please call your clinic at [phone] to:    Ask questions about your health    Make or cancel appointments    Discuss your medicines    Learn about your test results    Speak to your doctor   If you have compliments or concerns about an experience at your clinic, or if you wish to file a complaint, please contact [facility] Patient Relations at [phone] or email us at [email or web address]         Additional Information About Your Visit        MyChart Information     ED01 is an electronic gateway that provides easy, online access to your medical records. With ED01, you can request a clinic appointment, read your test results, renew a prescription or communicate with your care team.     To sign up for [employer] visit the website at [email or web address]   You will be asked to enter the access code listed below, as well as some personal information. Please  follow the directions to create your username and password.     Your access code is: [email or web address]  Expires: 2018  9:39 AM     Your access code will  in 90 days. If you need help or a new code, please contact your [facility] or call [phone] for assistance.        Care EveryWhere ID     This is your Care EveryWhere ID. This could be used by other organizations to access your [place] medical records  [id number]        Your Vitals Were     Pulse Temperature Pulse Oximetry BMI (Body Mass Index)          76 98.3  F (36.8  C) (Oral) 98% 29.29 kg/m2         Blood Pressure from Last 3 Encounters:   17 145/73   17 118/72   17 129/72    Weight from Last 3 Encounters:   17 155 lb (70.3 kg)   17 157 lb (71.2 kg)   17 154 lb (69.9 kg)              We Performed the Following     Hemoglobin A1c ([facility])          Today's Medication Changes          These changes are accurate as of: 17  9:40 AM.  If you have any questions, ask your nurse or doctor.               Start taking these medicines.        Dose/Directions    predniSONE 20 MG tablet   Commonly known as:  DELTASONE   Used for:  Acute gouty arthritis   Started by:  Johana Reyes MD        Dose:  40 mg   Take 2 tablets (40 mg) by mouth daily for 5 days   Quantity:  10 tablet   Refills:  0            Where to get your medicines      These medications were sent to [facility] [address] [place], [place] [address] [address] [facility] [address]  [address] [address]    Hours:  24-hours Phone:  [phone]     aspirin 81 MG tablet    ibuprofen 200 MG capsule    predniSONE 20 MG tablet                Primary Care Provider Office Phone # Fax #    Francisco Amaya [phone]       [facility] [address] [place] [address]        Equal Access to Services     GARETH JENSEN AH: Sergio Sibley,  waluciada eboniadaha, qaybta kaalvarez romero, luis m arriazadotty ah. So [facility] [phone].    ATENCIÓN: Si marlene neves, tiene a hughes disposición servicios gratuitos de asistencia lingüística. Dimple al [phone].    We comply with applicable federal civil rights laws and [place] laws. We do not discriminate on the basis of race, color, national origin, age, disability, sex, sexual orientation, or gender identity.            Thank you!     Thank you for choosing [facility]  for your care. Our goal is always to provide you with excellent care. Hearing back from our patients is one way we can continue to improve our services. Please take a few minutes to complete the written survey that you may receive in the mail after your visit with us. Thank you!             Your Updated Medication List - Protect others around you: Learn how to safely use, store and throw away your medicines at www.disposemymeds.org.          This list is accurate as of: 11/27/17  9:40 AM.  Always use your most recent med list.                   Brand Name Dispense Instructions for use Diagnosis    Adult Blood Pressure Cuff Lg Kit     1 kit    1 Device daily    Essential hypertension with goal blood pressure less than 140/90       allopurinol 100 MG tablet    ZYLOPRIM    30 tablet    Take 1 tablet (100 mg) by mouth daily    Chronic gout without tophus, unspecified cause, unspecified site       Ankle Stirrup Brace/Left Misc      Use as needed    Acute left ankle pain       aspirin 81 MG tablet     90 tablet    Take 1 tablet (81 mg) by mouth daily    Routine general medical examination at a health care facility       atorvastatin 40 MG tablet    LIPITOR    90 tablet    Take 1 tablet (40 mg) by mouth daily    Hyperlipidemia LDL goal <100       gabapentin 100 MG capsule    NEURONTIN    60 capsule    Take 1 capsule (100 mg) by mouth 2 times daily    Neuropathy       ibuprofen 200 MG capsule     120 capsule    Take 400 mg by  mouth every 8 hours as needed for pain    Chronic gout without tophus, unspecified cause, unspecified site       levothyroxine 75 MCG tablet    SYNTHROID/LEVOTHROID    90 tablet    Take 1 tablet (75 mcg) by mouth daily    Hypothyroidism, unspecified type       lisinopril 20 MG tablet    PRINIVIL/ZESTRIL    90 tablet    Take 1 tablet (20 mg) by mouth daily    Essential hypertension with goal blood pressure less than 140/90       multivitamin, therapeutic with minerals Tabs tablet     100 tablet    Take 1 tablet by mouth daily    Forgetfulness       predniSONE 20 MG tablet    DELTASONE    10 tablet    Take 2 tablets (40 mg) by mouth daily for 5 days    Acute gouty arthritis       ranitidine 150 MG tablet    ZANTAC    60 tablet    Take 1 tablet (150 mg) by mouth 2 times daily as needed for heartburn    Gastroesophageal reflux disease, esophagitis presence not specified

## 2017-11-27 NOTE — LETTER
November 27, 2017      Rafy Lentzjoo Riggins  [address]  [facility] [facility] [address]        Dear Ms. Campbell Gilson,     Thanks for coming to [facility]! Your blood sugar level is still high at 8.2, but improved from before.Please schedule an appointment in the next month to talk about starting a medication. Please call with any questions.     Sincerely,     Johana Reyes MD     Please see below for your test results.    Resulted Orders   Hemoglobin A1c ([facility])   Result Value Ref Range    Hemoglobin A1C 8.2 (H) 4.1 - 5.7 %

## 2017-11-27 NOTE — PROGRESS NOTES
Preceptor attestation:  Patient seen and discussed with the resident. Assessment and plan reviewed with resident and agreed upon.  Supervising physician: Braeden Slaughter  [facility]

## 2017-11-27 NOTE — PATIENT INSTRUCTIONS
-Take prednsione (steroid) 40 mg daily for 5 days to help with left foot pain/gout  -Refilled ibuprofen for as needed for pain  -Refilled aspirin 81 mg daily for heart health  -No other changes to medications  -Check A1c (diabetes blood sugar level)    Referral has been sent to [facility] as requested.   They will review and contact patient to schedule.  Teresa  11/27/17

## 2017-11-27 NOTE — NURSING NOTE
"Injectable Influenza Immunization Documentation    1.  Has the patient received the information for the injectable influenza vaccine? YES     2. Is the patient 6 months of age or older? YES     3. Does the patient have any of the following contraindications?         Severe allergy to eggs? No     Severe allergic reaction to previous influenza vaccines? No   Severe allergy to latex? No       History of Guillain-Whitmore syndrome? No     Currently have a temperature greater than 100.4F? No        4.  Severely egg allergic patients should have flu vaccine eligibility assessed by an MD, RN, or pharmacist, and those who received flu vaccine should be observed for 15 min by an MD, RN, Pharmacist, Medical Technician, or member of clinic staff.\": YES    5. Latex-allergic patients should be given latex-free influenza vaccine Yes. Please reference the Vaccine latex table to determine if your clinic s product is latex-containing.       Vaccination given by CASA Osorio          "

## 2017-11-27 NOTE — PROGRESS NOTES
Please send results letter to patient, thanks!    Dear Ms. Adrian Riggins,    Thanks for coming to [facility]! Your blood sugar level is still high at 8.2, but improved from before.Please schedule an appointment in the next month to talk about starting a medication. Please call with any questions.    Sincerely,  Johana Reyes MD

## 2017-11-28 ASSESSMENT — ANXIETY QUESTIONNAIRES: GAD7 TOTAL SCORE: 7

## 2017-11-30 ENCOUNTER — MEDICAL CORRESPONDENCE (OUTPATIENT)
Dept: HEALTH INFORMATION MANAGEMENT | Facility: CLINIC | Age: 77
End: 2017-11-30

## 2017-12-02 ENCOUNTER — MEDICAL CORRESPONDENCE (OUTPATIENT)
Dept: HEALTH INFORMATION MANAGEMENT | Facility: CLINIC | Age: 77
End: 2017-12-02

## 2017-12-11 DIAGNOSIS — G62.9 NEUROPATHY: ICD-10-CM

## 2017-12-13 RX ORDER — GABAPENTIN 100 MG/1
100 CAPSULE ORAL 2 TIMES DAILY
Qty: 60 CAPSULE | Refills: 3 | Status: SHIPPED | OUTPATIENT
Start: 2017-12-13 | End: 2018-07-19

## 2017-12-21 ENCOUNTER — OFFICE VISIT (OUTPATIENT)
Dept: FAMILY MEDICINE | Facility: CLINIC | Age: 77
End: 2017-12-21
Payer: COMMERCIAL

## 2017-12-21 VITALS
HEART RATE: 90 BPM | WEIGHT: 151 LBS | SYSTOLIC BLOOD PRESSURE: 134 MMHG | DIASTOLIC BLOOD PRESSURE: 81 MMHG | BODY MASS INDEX: 28.53 KG/M2 | TEMPERATURE: 98.2 F | OXYGEN SATURATION: 96 %

## 2017-12-21 DIAGNOSIS — E11.9 TYPE 2 DIABETES MELLITUS WITHOUT COMPLICATION, WITHOUT LONG-TERM CURRENT USE OF INSULIN (H): Primary | ICD-10-CM

## 2017-12-21 ASSESSMENT — ANXIETY QUESTIONNAIRES
5. BEING SO RESTLESS THAT IT IS HARD TO SIT STILL: NOT AT ALL
2. NOT BEING ABLE TO STOP OR CONTROL WORRYING: NOT AT ALL
3. WORRYING TOO MUCH ABOUT DIFFERENT THINGS: NOT AT ALL
1. FEELING NERVOUS, ANXIOUS, OR ON EDGE: NOT AT ALL
GAD7 TOTAL SCORE: 0
6. BECOMING EASILY ANNOYED OR IRRITABLE: NOT AT ALL
7. FEELING AFRAID AS IF SOMETHING AWFUL MIGHT HAPPEN: NOT AT ALL

## 2017-12-21 ASSESSMENT — PATIENT HEALTH QUESTIONNAIRE - PHQ9
SUM OF ALL RESPONSES TO PHQ QUESTIONS 1-9: 2
5. POOR APPETITE OR OVEREATING: NOT AT ALL

## 2017-12-21 NOTE — MR AVS SNAPSHOT
After Visit Summary   2017    Rafy Riggins    MRN: [id number]           Patient Information     Date Of Birth          1940        Visit Information        Provider Department      2017 12:30 PM Francisco Amaya MD [facility]        Today's Diagnoses     Type 2 diabetes mellitus without complication, without long-term current use of insulin (H)    -  1      Care Instructions    Take 1 tab of metformin (500mg) daily w/ dinner for 1 wk, increase to 1 tab daily w/ breakfast and dinner for 1 wk, then 1 tab daily w/ breakfast and 2 tablets w/ dinner for 1 wk, then final dosing of 2 tabs w/ breakfast and dinner.          Follow-ups after your visit        Who to contact     Please call your clinic at [phone] to:    Ask questions about your health    Make or cancel appointments    Discuss your medicines    Learn about your test results    Speak to your doctor   If you have compliments or concerns about an experience at your clinic, or if you wish to file a complaint, please contact [facility] Patient Relations at [phone] or email us at [email or web address]         Additional Information About Your Visit        MyChart Information     [email or web address] is an electronic gateway that provides easy, online access to your medical records. With [employer], you can request a clinic appointment, read your test results, renew a prescription or communicate with your care team.     To sign up for [email or web address] visit the website at [email or web address]   You will be asked to enter the access code listed below, as well as some personal information. Please follow the directions to create your username and password.     Your access code is: [email or web address]  Expires: 2018  9:39 AM     Your access code will  in 90 days. If you need help or a new code, please contact your [facility] or call [phone] for  assistance.        Care EveryWhere ID     This is your Care EveryWhere ID. This could be used by other organizations to access your [facility] medical records  [id number]        Your Vitals Were     Pulse Temperature Pulse Oximetry BMI (Body Mass Index)          90 98.2  F (36.8  C) 96% 28.53 kg/m2         Blood Pressure from Last 3 Encounters:   12/21/17 134/81   11/27/17 145/73   08/18/17 118/72    Weight from Last 3 Encounters:   12/21/17 151 lb (68.5 kg)   11/27/17 155 lb (70.3 kg)   08/18/17 157 lb (71.2 kg)              Today, you had the following     No orders found for display         Today's Medication Changes          These changes are accurate as of: 12/21/17  1:09 PM.  If you have any questions, ask your nurse or doctor.               Start taking these medicines.        Dose/Directions    metFORMIN 500 MG tablet   Commonly known as:  GLUCOPHAGE   Used for:  Type 2 diabetes mellitus without complication, without long-term current use of insulin (H)   Started by:  Francisco Amaya MD        Take 1 tab (500mg) daily w/ dinner for 1 wk, increase to 1 tab daily w/ breakfast and dinner for 1 wk. See pt instructions for further doses   Quantity:  90 tablet   Refills:  1            Where to get your medicines      These medications were sent to [facility] [address] [place] [address] [address] AT [facility] [address]  [address], [facility] [address]    Hours:  24-hours Phone:  [phone]     metFORMIN 500 MG tablet                Primary Care Provider Office Phone # Fax #    Francisco Amaya [phone]       [facility] [address] [place] [address]        Equal Access to Services     CHRISTOFER JENSEN AH: Sergio Sibley, waaxda luqadaha, qaybta kaalmada adeegyada, luis m polk. So [facility] [phone].    ATENCIÓN: Si habla español, tiene a hughes disposición servicios gratuitos de asistencia  lingüística. Dimple al [phone].    We comply with applicable federal civil rights laws and [place] laws. We do not discriminate on the basis of race, color, national origin, age, disability, sex, sexual orientation, or gender identity.            Thank you!     Thank you for choosing [facility]  for your care. Our goal is always to provide you with excellent care. Hearing back from our patients is one way we can continue to improve our services. Please take a few minutes to complete the written survey that you may receive in the mail after your visit with us. Thank you!             Your Updated Medication List - Protect others around you: Learn how to safely use, store and throw away your medicines at www.disposemymeds.org.          This list is accurate as of: 12/21/17  1:09 PM.  Always use your most recent med list.                   Brand Name Dispense Instructions for use Diagnosis    Adult Blood Pressure Cuff Lg Kit     1 kit    1 Device daily    Essential hypertension with goal blood pressure less than 140/90       allopurinol 100 MG tablet    ZYLOPRIM    30 tablet    Take 1 tablet (100 mg) by mouth daily    Chronic gout without tophus, unspecified cause, unspecified site       Ankle Stirrup Brace/Left Misc      Use as needed    Acute left ankle pain       aspirin 81 MG tablet     90 tablet    Take 1 tablet (81 mg) by mouth daily    Routine general medical examination at a health care facility       atorvastatin 40 MG tablet    LIPITOR    90 tablet    Take 1 tablet (40 mg) by mouth daily    Hyperlipidemia LDL goal <100       benzonatate 100 MG capsule    TESSALON    42 capsule    Take 1 capsule (100 mg) by mouth 3 times daily as needed for cough    Cough       gabapentin 100 MG capsule    NEURONTIN    60 capsule    Take 1 capsule (100 mg) by mouth 2 times daily    Neuropathy       ibuprofen 200 MG capsule     120 capsule    Take 400 mg by mouth every 8 hours as needed for pain    Chronic gout  without tophus, unspecified cause, unspecified site       levothyroxine 75 MCG tablet    SYNTHROID/LEVOTHROID    90 tablet    Take 1 tablet (75 mcg) by mouth daily    Hypothyroidism, unspecified type       lisinopril 20 MG tablet    PRINIVIL/ZESTRIL    90 tablet    Take 1 tablet (20 mg) by mouth daily    Essential hypertension with goal blood pressure less than 140/90       metFORMIN 500 MG tablet    GLUCOPHAGE    90 tablet    Take 1 tab (500mg) daily w/ dinner for 1 wk, increase to 1 tab daily w/ breakfast and dinner for 1 wk. See pt instructions for further doses    Type 2 diabetes mellitus without complication, without long-term current use of insulin (H)       multivitamin, therapeutic with minerals Tabs tablet     100 tablet    Take 1 tablet by mouth daily    Forgetfulness       ranitidine 150 MG tablet    ZANTAC    60 tablet    Take 1 tablet (150 mg) by mouth 2 times daily as needed for heartburn    Gastroesophageal reflux disease, esophagitis presence not specified

## 2017-12-21 NOTE — PROGRESS NOTES
ASSESSMENT AND PLAN      Rafy was seen today for diabetes and recheck medication.    Diagnoses and all orders for this visit:    Type 2 diabetes mellitus without complication, without long-term current use of insulin (H)-patient with hemoglobin A1c of 8.2% with diet and exercise.  Will recheck hemoglobin A1c in 3 months.  -     metFORMIN (GLUCOPHAGE) 500 MG tablet; Take 1 tab of metformin (500mg) daily w/ dinner for 1 wk, increase to 1 tab daily w/ breakfast and dinner for 1 wk, then 1 tab daily w/ breakfast and 2 tablets w/ dinner for 1 wk, then final dosing of 2 tabs w/ breakfast and dinner.      RTC in 3 months for follow up of diabetes or sooner if develops new or worsening symptoms.    Francisco Amaya MD PGY2  [facility]                SUBJECTIVE       Rafy Riggins is a 77 year old  male with a PMH significant for:     Patient Active Problem List   Diagnosis     HTN (hypertension)     Back pain     Poor vision     S/P lumbar spinal fusion     Colon polyp     Diabetes mellitus, type 2 (H)     Obesity     Lumbosacral radiculopathy at S1     Gout of foot     Hypothyroidism     He presents for follow-up of diabetes. Patient had hgba1c in November at 8.2%.  Patient has been attempting to control blood sugars with exercise and diet.  Patient states that he has cut down quite a lot carbohydrates and oils.  Patient okay to start medications at this time.  Patient has concerns that he will be on this medication for life.    Patient notes that his gout attack that he came in for in November since resolved.  Patient does not notice any pain secondary to that at this time.    Denies any headache, fever, chest pain, sob, abdominal pain, constipation, diarrhea.    Denies any smoking, EtOH use, or other illicit drug use.     PMH, Medications and Allergies were reviewed and updated as needed.          OBJECTIVE     Vitals:    12/21/17 1240   BP: 134/81   Pulse: 90   Temp: 98.2  F (36.8  C)   SpO2: 96%    Weight: 151 lb (68.5 kg)     Body mass index is 28.53 kg/(m^2).    General: Alert and oriented ×3, no acute distress, patient answers questions appropriately.  Eyes: Extra movements intact, no scleral icterus, no conjunctivitis  CV: S1, S2, regular rate and rhythm.  No murmurs noted.  Respiratory: Clear to auscultation bilaterally.  Extremities no peripheral edema noted.    No results found for this or any previous visit (from the past 24 hour(s)).

## 2017-12-21 NOTE — PATIENT INSTRUCTIONS
Take 1 tab of metformin (500mg) daily w/ dinner for 1 wk, increase to 1 tab daily w/ breakfast and dinner for 1 wk, then 1 tab daily w/ breakfast and 2 tablets w/ dinner for 1 wk, then final dosing of 2 tabs w/ breakfast and dinner.

## 2017-12-21 NOTE — PROGRESS NOTES
Preceptor attestation:  Patient seen and discussed with the resident. Assessment and plan reviewed with resident and agreed upon.  Supervising physician: Braeden Slaughter  [facility]

## 2017-12-22 ASSESSMENT — ANXIETY QUESTIONNAIRES: GAD7 TOTAL SCORE: 0

## 2018-01-31 ENCOUNTER — MEDICAL CORRESPONDENCE (OUTPATIENT)
Dept: HEALTH INFORMATION MANAGEMENT | Facility: CLINIC | Age: 78
End: 2018-01-31

## 2018-02-01 ENCOUNTER — TELEPHONE (OUTPATIENT)
Dept: FAMILY MEDICINE | Facility: CLINIC | Age: 78
End: 2018-02-01

## 2018-02-01 NOTE — TELEPHONE ENCOUNTER
[facility] phone call message-patient reporting a symptom:     Symptom: Pt is currently on  imetFORMIN (GLUCOPHAGE) 500 MG tablets.  The nurse has set up his final dosages and the patient is considering stopping the medication. He is experiencing chronic diarrhea. Please advise    Same Day Visit Offered: no     Additional comments: none    OK to leave message on voice mail? Yes    Primary language: [facility]      needed? Yes    Call taken on February 1, 2018 at 10:47 AM by Wayne Salas

## 2018-02-07 DIAGNOSIS — E11.9 TYPE 2 DIABETES MELLITUS WITHOUT COMPLICATION, WITHOUT LONG-TERM CURRENT USE OF INSULIN (H): ICD-10-CM

## 2018-02-07 DIAGNOSIS — E78.5 HYPERLIPIDEMIA LDL GOAL <100: ICD-10-CM

## 2018-02-07 RX ORDER — ATORVASTATIN CALCIUM 40 MG/1
40 TABLET, FILM COATED ORAL DAILY
Qty: 90 TABLET | Refills: 1 | Status: SHIPPED | OUTPATIENT
Start: 2018-02-07 | End: 2018-04-05

## 2018-02-07 NOTE — TELEPHONE ENCOUNTER
Christiano Ford,    If you wouldn't mind having  calling Rafy to let him know the following:    Christiano Hillman,    I would wish for you to try continuing the metformin, but decreasing the dose to three 500mg tablets instead of four and see if that helps with his diarrhea symptoms.     Franicsco Amaya MD PGY2  [facility]

## 2018-02-07 NOTE — TELEPHONE ENCOUNTER
If you don't mind, could you also let them know that I sent in a refill of the metformin as well. If he has any questions, he can come into clinic and we can have a talk. It is important for him to control his blood sugars, and this is one of the best medications to do it.    Francisco Amaya MD PGY2  [facility]

## 2018-02-08 NOTE — TELEPHONE ENCOUNTER
Spoke with his son about continue taking Metformin with new dose.  He will pick new Rx tomorrow and have his dad continue to take it.  He understand well.

## 2018-02-15 ENCOUNTER — TELEPHONE (OUTPATIENT)
Dept: FAMILY MEDICINE | Facility: CLINIC | Age: 78
End: 2018-02-15

## 2018-02-15 NOTE — TELEPHONE ENCOUNTER
"[facility] phone call message- general phone call:    Reason for call: Re certification for home care. If it can say, \"Continue home care services from 01/31/2018-02/15/2018.     Return call needed: Yes    OK to leave a message on voice mail? Yes    Primary language: [facility]      needed? Yes    Call taken on February 15, 2018 at 3:30 PM by Grisel Flores-Cardona  "

## 2018-02-15 NOTE — TELEPHONE ENCOUNTER
Gave verbal order for home care that occurred from 1/31/18- 2/15/18.   Routed to Dr. Amaay PCP/JANEY Crenshaw

## 2018-02-16 DIAGNOSIS — K21.9 GASTROESOPHAGEAL REFLUX DISEASE, ESOPHAGITIS PRESENCE NOT SPECIFIED: ICD-10-CM

## 2018-02-23 ENCOUNTER — MEDICAL CORRESPONDENCE (OUTPATIENT)
Dept: HEALTH INFORMATION MANAGEMENT | Facility: CLINIC | Age: 78
End: 2018-02-23

## 2018-03-13 ENCOUNTER — MEDICAL CORRESPONDENCE (OUTPATIENT)
Dept: HEALTH INFORMATION MANAGEMENT | Facility: CLINIC | Age: 78
End: 2018-03-13

## 2018-03-14 DIAGNOSIS — M1A.9XX0 CHRONIC GOUT WITHOUT TOPHUS, UNSPECIFIED CAUSE, UNSPECIFIED SITE: ICD-10-CM

## 2018-03-15 ENCOUNTER — OFFICE VISIT (OUTPATIENT)
Dept: FAMILY MEDICINE | Facility: CLINIC | Age: 78
End: 2018-03-15
Payer: COMMERCIAL

## 2018-03-15 VITALS
SYSTOLIC BLOOD PRESSURE: 156 MMHG | HEIGHT: 61 IN | DIASTOLIC BLOOD PRESSURE: 78 MMHG | TEMPERATURE: 98.4 F | HEART RATE: 69 BPM | WEIGHT: 146.6 LBS | BODY MASS INDEX: 27.68 KG/M2

## 2018-03-15 DIAGNOSIS — E11.9 TYPE 2 DIABETES MELLITUS WITHOUT COMPLICATION, WITHOUT LONG-TERM CURRENT USE OF INSULIN (H): Primary | ICD-10-CM

## 2018-03-15 DIAGNOSIS — M1A.9XX0 CHRONIC GOUT WITHOUT TOPHUS, UNSPECIFIED CAUSE, UNSPECIFIED SITE: ICD-10-CM

## 2018-03-15 LAB
BUN SERPL-MCNC: 12.8 MG/DL (ref 7–21)
CALCIUM SERPL-MCNC: 10 MG/DL (ref 8.5–10.1)
CHLORIDE SERPLBLD-SCNC: 102.8 MMOL/L (ref 98–110)
CO2 SERPL-SCNC: 24.6 MMOL/L (ref 20–32)
CREAT SERPL-MCNC: 1.1 MG/DL (ref 0.7–1.3)
GFR SERPL CREATININE-BSD FRML MDRD: 68.6 ML/MIN/1.7 M2
GLUCOSE SERPL-MCNC: 74.7 MG'DL (ref 70–99)
HBA1C MFR BLD: 6.5 % (ref 4.1–5.7)
POTASSIUM SERPL-SCNC: 4.5 MMOL/DL (ref 3.2–4.6)
SODIUM SERPL-SCNC: 136.2 MMOL/L (ref 132–142)

## 2018-03-15 RX ORDER — ALLOPURINOL 100 MG/1
100 TABLET ORAL DAILY
Qty: 30 TABLET | Refills: 11 | Status: SHIPPED | OUTPATIENT
Start: 2018-03-15 | End: 2019-02-22

## 2018-03-15 RX ORDER — ALLOPURINOL 100 MG/1
100 TABLET ORAL DAILY
Qty: 30 TABLET | Refills: 11 | Status: SHIPPED | OUTPATIENT
Start: 2018-03-15 | End: 2018-03-15

## 2018-03-15 NOTE — MR AVS SNAPSHOT
After Visit Summary   3/15/2018    Rafy Riggins    MRN: [id number]           Patient Information     Date Of Birth          1940        Visit Information        Provider Department      3/15/2018 1:10 PM Abdi Will DO [facility]        Today's Diagnoses     Type 2 diabetes mellitus without complication, without long-term current use of insulin (H)    -  1      Care Instructions                MEDICARE WELLNESS EXAM PATIENT INSTRUCTIONS    Yearly exam:     See your health care provider every year in order to review changes in your health, review medicines that you take, and discuss preventive care needs such as immunizations and cancer screening.    Get a flu shot each year.     Advance Directives:    If you have not done so, you are encouraged to complete advance directives and/or a living will.   More information about advance directives can be found at: http://www.[place]med.org/advocacy/Key-Issues/Advance-Directives    Nutrition:     Eat at least 5 servings of fruits and vegetables each day.     Eat whole-grain bread, whole-wheat pasta and brown rice instead of white grains and rice.     Talk to your doctor about Calcium and Vitamin D.     Lifestyle:    Exercise for at least 150 minutes a week (30 minutes a day, 5 days a week). This will help you control your weight and prevent disease.     Limit alcohol to one drink per day.     If you smoke, try to quit - your doctor will be happy to help.     Wear sunscreen to prevent skin cancer.     See your dentist every six months for an exam and cleaning.     See your eye doctor every 1 to 2 years to screen for conditions such as glaucoma, macular degeneration and cataracts.      Diabetes: start januvia 100 mg once a day (side effect: headache)    F/U in 1-2 weeks to discuss balance, urine and memory          Follow-ups after your visit        Who to contact     Please call your clinic at [phone] to:    Ask questions about your  "health    Make or cancel appointments    Discuss your medicines    Learn about your test results    Speak to your doctor            Additional Information About Your Visit        Crowd[employer] Information     [employer] is an electronic gateway that provides easy, online access to your medical records. With [employer], you can request a clinic appointment, read your test results, renew a prescription or communicate with your care team.     To sign up for [employer] visit the website at [email or web address]   You will be asked to enter the access code listed below, as well as some personal information. Please follow the directions to create your username and password.     Your access code is: [email or web address]  Expires: 2018  2:04 PM     Your access code will  in 90 days. If you need help or a new code, please contact your [facility] or call [phone] for assistance.        Care EveryWhere ID     This is your Care EveryWhere ID. This could be used by other organizations to access your [facility] medical records  [id number]        Your Vitals Were     Pulse Temperature Height BMI (Body Mass Index)          69 98.4  F (36.9  C) (Oral) 5' 1.25\" (155.6 cm) 27.47 kg/m2         Blood Pressure from Last 3 Encounters:   03/15/18 156/78   17 134/81   17 145/73    Weight from Last 3 Encounters:   03/15/18 146 lb 9.6 oz (66.5 kg)   17 151 lb (68.5 kg)   17 155 lb (70.3 kg)              We Performed the Following     Basic Metabolic Panel ([facility])     Hemoglobin A1c ([facility])          Today's Medication Changes          These changes are accurate as of 3/15/18  2:05 PM.  If you have any questions, ask your nurse or doctor.               Start taking these medicines.        Dose/Directions    sitagliptin 50 MG tablet   Commonly known as:  JANUVIA   Used for:  Type 2 diabetes mellitus without complication, without long-term current use of insulin (H)   Started by:  Suman" Abdijuan Halebarbara, DO        Take one 50 gm tablet daily for one week and then take two 50 mg tablets after that   Quantity:  90 tablet   Refills:  1            Where to get your medicines      These medications were sent to [id number] [place] [address]  [address], [facility] [address]     Phone:  [phone]     sitagliptin 50 MG tablet                Primary Care Provider Office Phone # Fax #    Francisco Saeed Amaya [phone]       [facility] [address] [place] [address]        Equal Access to Services     [facility]: Hadii aad ku hadasho Soomaali, waaxda luqadaha, qaybta kaalmada adeegyada, waxaura castilloin hayalejandrinan milton stafford . So [facility] [phone].    ATENCIÓN: Si habla español, tiene a hughes disposición servicios gratuitos de asistencia lingüística. [facility] [phone].    We comply with applicable federal civil rights laws and [place] laws. We do not discriminate on the basis of race, color, national origin, age, disability, sex, sexual orientation, or gender identity.            Thank you!     Thank you for choosing [facility]  for your care. Our goal is always to provide you with excellent care. Hearing back from our patients is one way we can continue to improve our services. Please take a few minutes to complete the written survey that you may receive in the mail after your visit with us. Thank you!             Your Updated Medication List - Protect others around you: Learn how to safely use, store and throw away your medicines at www.disposemymeds.org.          This list is accurate as of 3/15/18  2:05 PM.  Always use your most recent med list.                   Brand Name Dispense Instructions for use Diagnosis    Adult Blood Pressure Cuff Lg Kit     1 kit    1 Device daily    Essential hypertension with goal blood pressure less than 140/90       allopurinol 100 MG tablet     ZYLOPRIM    30 tablet    Take 1 tablet (100 mg) by mouth daily    Chronic gout without tophus, unspecified cause, unspecified site       Ankle Stirrup Brace/Left Misc      Use as needed    Acute left ankle pain       aspirin 81 MG tablet     90 tablet    Take 1 tablet (81 mg) by mouth daily    Routine general medical examination at a health care facility       atorvastatin 40 MG tablet    LIPITOR    90 tablet    Take 1 tablet (40 mg) by mouth daily    Hyperlipidemia LDL goal <100       benzonatate 100 MG capsule    TESSALON    42 capsule    Take 1 capsule (100 mg) by mouth 3 times daily as needed for cough    Cough       gabapentin 100 MG capsule    NEURONTIN    60 capsule    Take 1 capsule (100 mg) by mouth 2 times daily    Neuropathy       ibuprofen 200 MG capsule     120 capsule    Take 400 mg by mouth every 8 hours as needed for pain    Chronic gout without tophus, unspecified cause, unspecified site       levothyroxine 75 MCG tablet    SYNTHROID/LEVOTHROID    90 tablet    Take 1 tablet (75 mcg) by mouth daily    Hypothyroidism, unspecified type       lisinopril 20 MG tablet    PRINIVIL/ZESTRIL    90 tablet    Take 1 tablet (20 mg) by mouth daily    Essential hypertension with goal blood pressure less than 140/90       metFORMIN 500 MG tablet    GLUCOPHAGE    90 tablet    Take 2 tabs (1,000mg) by mouth in the morning with breakfast, and 1 tab (500mg) by mouth in the evening with dinner.    Type 2 diabetes mellitus without complication, without long-term current use of insulin (H)       multivitamin, therapeutic with minerals Tabs tablet     100 tablet    Take 1 tablet by mouth daily    Forgetfulness       ranitidine 150 MG tablet    ZANTAC    60 tablet    Take 1 tablet (150 mg) by mouth 2 times daily as needed for heartburn    Gastroesophageal reflux disease, esophagitis presence not specified       sitagliptin 50 MG tablet    JANUVIA    90 tablet    Take one 50 gm tablet daily for one week and then take two 50  mg tablets after that    Type 2 diabetes mellitus without complication, without long-term current use of insulin (H)

## 2018-03-15 NOTE — NURSING NOTE
Medicare Wellness Visit  Health Risk Assessment        Visual Acuity:  Right Eye: 10/40   Left Eye: 10/50  Both Eyes: 10/40        FALL RISK ASSESSMENT 8/2/2017   Fallen 2 or more times in the past year? No   Any fall with injury in the past year? No            Health Risk Assessment / Review of Systems     Constitutional: Any fevers or night sweats? No     Eyes:  Vision problems    YES blurry vision     Hearing Do you feel you have hearing loss?  No     Cardiovascular: Any chest pain, fast or irregular heart beat, calf pain with walking?     No           Respiratory:   Any breathing problems or cough?   No     Gastrointestinal: Any stomach or stool problems?   No      Genitourinary: Do you have difficulty controlling urination?    YES unable to control urine    Muscles and Joints: Any joint stiffness or soreness?    YES numbness and tingling     Skin: Any concerning lesions or moles?    YES     Nervous System: Any loss of strength or feeling, numbness or tingling, shaking, dizziness, or headache?   YES loss of strength or feeling, numbness or tingling and shaking     Mental Health: Any depression, anxiety or problems sleeping?     YES can not go to bed after he wakes up in the middle of the night    Cognition: Do you have any problems with your memory?   YES low memory forgetful     PHQ-2 Score:   PHQ-2 ( 1999 Pfizer) 8/18/2017 8/2/2017   Q1: Little interest or pleasure in doing things 0 0   Q2: Feeling down, depressed or hopeless 0 0   PHQ-2 Score 0 0       PHQ-9 Score:   No flowsheet data found.         Medical Care     What other specialists or organizations are involved in your medical care?  no  Patient Care Team       Relationship Specialty Notifications Start End    Francisco Amaya MD PCP - General Student in organized health care education/training program  7/1/17     Phone: [phone] Fax: [phone]         [facility] [address] [place] [address]    Soraya Hudson     12/8/15   "   Comment:  Moises     Phone: [phone]                      Social History / Home Safety     Social History   Substance Use Topics     Smoking status: Never Smoker     Smokeless tobacco: Never Used     Alcohol use No     Marital Status:  Who lives in your household? Wife, son, daughter, daughter in law and grandchild     Does your home have any of the following safety concerns? Loose rugs in the hallway, no grab bars in the bathroom, no handrails on the stairs or have poorly lit areas?  No     Do you feel threatened or controlled by a partner, ex-partner or anyone in your life? No     Has anyone hurt you physically, for example by pushing, hitting, slapping or kicking you   or forcing you to have sex? No          Functional Status     Do you need help with dressing yourself, bathing, or walking? YES bathing and dressing himself    Do you need help with the phone, transportation, shopping, preparing meals, housework, laundry, medications or managing money? YES transportation, shopping, preparing meals, housework, laundry medication or managing money       Risk Behaviors and Healthy Habits     History   Smoking Status     Never Smoker   Smokeless Tobacco     Never Used     How many servings of fruits and vegetables do you eat a day? 2 times    Exercise: 1 day/week for an average of  A little bit when able to      Do you frequently drive without a seatbelt? No     Do you use any other drugs? No         Do you use alcohol?No      Frailty Assessment            1. By yourself and note using aids, do you have difficulty walking up 10 steps without resting?   YES 1 (1 for Yes, 0 for No)    2. By yourself and not using mobility aids, do you have any difficulty walking several hundred yards?  YES 1 (1 for Yes, 0 for No)    3. Have you lost 10 or more pounds unintentionally in the previous year?  YES 1 (If \"Yes\" and >5% weight loss, then score 1.  Score 0, if <5% weight loss or \"No\" weight loss)    4. " "How much of the times during the past 3 weeks did you feel tired? 1. All of the time (\"1\" or \"2\" are scored 1, others 0)    5.  A doctor told the patient they had the following illnesses:  High blood pressure  Diabetes (0-4 = score 0, 5-11= score 1)        Annamarie CORMIER     "

## 2018-03-15 NOTE — PATIENT INSTRUCTIONS
MEDICARE WELLNESS EXAM PATIENT INSTRUCTIONS    Yearly exam:     See your health care provider every year in order to review changes in your health, review medicines that you take, and discuss preventive care needs such as immunizations and cancer screening.    Get a flu shot each year.     Advance Directives:    If you have not done so, you are encouraged to complete advance directives and/or a living will.   More information about advance directives can be found at: http://www.[place]med.org/advocacy/Key-Issues/Advance-Directives    Nutrition:     Eat at least 5 servings of fruits and vegetables each day.     Eat whole-grain bread, whole-wheat pasta and brown rice instead of white grains and rice.     Talk to your doctor about Calcium and Vitamin D.     Lifestyle:    Exercise for at least 150 minutes a week (30 minutes a day, 5 days a week). This will help you control your weight and prevent disease.     Limit alcohol to one drink per day.     If you smoke, try to quit - your doctor will be happy to help.     Wear sunscreen to prevent skin cancer.     See your dentist every six months for an exam and cleaning.     See your eye doctor every 1 to 2 years to screen for conditions such as glaucoma, macular degeneration and cataracts.      Diabetes: start januvia 100 mg once a day (side effect: headache)    F/U in 1-2 weeks to discuss balance, urine and memory

## 2018-03-15 NOTE — PROGRESS NOTES
Preceptor attestation:  Patient seen and discussed with the resident. Assessment and plan reviewed with resident and agreed upon.  Supervising physician: Alvin Stewart  [facility]

## 2018-03-15 NOTE — PROGRESS NOTES
65+ Annual Wellness Visit         HPI     This 78 year old male presents as an established patient  Of Dr. Lorenzo who presents for a Subsequent Medicare Wellness Visit    Other issues patient wants to be addressed today:    Chief Complaint   Patient presents with     Wellness Visit         Patient Active Problem List   Diagnosis     HTN (hypertension)     Back pain     Poor vision     S/P lumbar spinal fusion     Colon polyp     Diabetes mellitus, type 2 (H)     Obesity     Lumbosacral radiculopathy at S1     Gout of foot     Hypothyroidism       Past Medical History:   Diagnosis Date     Diabetes mellitus, type 2 (H) 8/3/2015     Gout of foot 3/2/2017     Hypertension      Hypothyroidism 3/2/2017        Family History   Problem Relation Age of Onset     CANCER Brother      DIABETES No family hx of      Coronary Artery Disease No family hx of      Hypertension No family hx of      Hyperlipidemia No family hx of      CEREBROVASCULAR DISEASE No family hx of      Breast Cancer No family hx of      Colon Cancer No family hx of      Prostate Cancer No family hx of      Other Cancer No family hx of      Depression No family hx of      Anxiety Disorder No family hx of      MENTAL ILLNESS No family hx of      Substance Abuse No family hx of      Anesthesia Reaction No family hx of      Asthma No family hx of      OSTEOPOROSIS No family hx of      Genetic Disorder No family hx of      Thyroid Disease No family hx of      Obesity No family hx of      Unknown/Adopted No family hx of          Problem List, Family History and past Medical History reviewed and unchanged/updated.    Nursing Notes:   Annamarie Guzman  3/15/2018  1:21 PM  Signed  Interpretor: Ashley  Language: Alicia  Agency: [employer]  Phone Number: [phone]    Annamarie Guzman  3/15/2018  1:32 PM  Signed  Medicare Wellness Visit  Health Risk Assessment        Visual Acuity:  Right Eye: 10/40   Left Eye: 10/50  Both Eyes: 10/40        FALL RISK ASSESSMENT 8/2/2017   Fallen  2 or more times in the past year? No   Any fall with injury in the past year? No            Health Risk Assessment / Review of Systems     Constitutional: Any fevers or night sweats? No     Eyes:  Vision problems    YES blurry vision     Hearing Do you feel you have hearing loss?  No     Cardiovascular: Any chest pain, fast or irregular heart beat, calf pain with walking?     No           Respiratory:   Any breathing problems or cough?   No     Gastrointestinal: Any stomach or stool problems?   No      Genitourinary: Do you have difficulty controlling urination?    YES unable to control urine    Muscles and Joints: Any joint stiffness or soreness?    YES numbness and tingling     Skin: Any concerning lesions or moles?    YES     Nervous System: Any loss of strength or feeling, numbness or tingling, shaking, dizziness, or headache?   YES loss of strength or feeling, numbness or tingling and shaking     Mental Health: Any depression, anxiety or problems sleeping?     YES can not go to bed after he wakes up in the middle of the night    Cognition: Do you have any problems with your memory?   YES low memory forgetful     PHQ-2 Score:   PHQ-2 ( 1999 Pfizer) 8/18/2017 8/2/2017   Q1: Little interest or pleasure in doing things 0 0   Q2: Feeling down, depressed or hopeless 0 0   PHQ-2 Score 0 0       PHQ-9 Score:   No flowsheet data found.         Medical Care     What other specialists or organizations are involved in your medical care?  no  Patient Care Team       Relationship Specialty Notifications Start End    Francisco Amaya MD PCP - General Student in organized health care education/training program  7/1/17     Phone: [phone] Fax: [phone]         [facility] [address] [place] [address]    Soraya Hudson     [birth year]     Comment:  Moises     Phone: [phone]                      Social History / Home Safety     Social History   Substance Use Topics     Smoking status:  "Never Smoker     Smokeless tobacco: Never Used     Alcohol use No     Marital Status:  Who lives in your household? Wife, son, daughter, daughter in law and grandchild     Does your home have any of the following safety concerns? Loose rugs in the hallway, no grab bars in the bathroom, no handrails on the stairs or have poorly lit areas?  No     Do you feel threatened or controlled by a partner, ex-partner or anyone in your life? No     Has anyone hurt you physically, for example by pushing, hitting, slapping or kicking you   or forcing you to have sex? No          Functional Status     Do you need help with dressing yourself, bathing, or walking? YES bathing and dressing himself    Do you need help with the phone, transportation, shopping, preparing meals, housework, laundry, medications or managing money? YES transportation, shopping, preparing meals, housework, laundry medication or managing money       Risk Behaviors and Healthy Habits     History   Smoking Status     Never Smoker   Smokeless Tobacco     Never Used     How many servings of fruits and vegetables do you eat a day? 2 times    Exercise: 1 day/week for an average of  A little bit when able to      Do you frequently drive without a seatbelt? No     Do you use any other drugs? No         Do you use alcohol?No      Frailty Assessment            1. By yourself and note using aids, do you have difficulty walking up 10 steps without resting?   YES 1 (1 for Yes, 0 for No)    2. By yourself and not using mobility aids, do you have any difficulty walking several hundred yards?  YES 1 (1 for Yes, 0 for No)    3. Have you lost 10 or more pounds unintentionally in the previous year?  YES 1 (If \"Yes\" and >5% weight loss, then score 1.  Score 0, if <5% weight loss or \"No\" weight loss)    4. How much of the times during the past 3 weeks did you feel tired? 1. All of the time (\"1\" or \"2\" are scored 1, others 0)    5.  A doctor told the patient they had the " "following illnesses:  High blood pressure  Diabetes (0-4 = score 0, 5-11= score 1)        Annamarie Guzman [facility]           Frailty Assessment         Total score: 4    Frailty screen score (3-5 = frail; consider interdisciplinary assessment and care.  1-2 = prefrail; at risk for adverse health events; 0 = robust)      EVALUATION OF COGNITIVE FUNCTION     Mood/affect:Normal  Appearance:Normal  Family member/caregiver input: Concerns detailed in initial questioning        Other Assessments     CV Risk based on Pooled Cohort Risk (consider assessing every 4-6 years; consider statin in patients with 10-yr risk > 7.5%): NA given patient's age  The ASCVD Risk score (Dario SANDY Jr, et al., 2013) failed to calculate for the following reasons:    The valid total cholesterol range is 130 to 320 mg/dL    Advance Directives: Discussed with patient and family as appropriate.  Has patient completed advance directives and/or a living will?  no         Immunization History   Administered Date(s) Administered     HepB 07/01/2013     Hepatitis B Immunity: Titer 02/24/2014     Influenza (High Dose) 3 valent vaccine 12/09/2016, 11/27/2017     Influenza Vaccine, 3 YRS +, IM (QUADRIVALENT W/PRESERVATIVES) 11/11/2014     Pneumo Conj 13-V (2010&after) 12/09/2016     Pneumococcal 23 valent 09/18/2014     Poliovirus, inactivated (IPV) 02/27/2014, 04/07/2014, 11/11/2014     TD (ADULT, 7+) 10/20/2011, 11/11/2014     TDAP Vaccine (Boostrix) 02/27/2014     Varicella Immunity: Titer/MD Dx 02/24/2014     Reviewed Immunization Record Today         Physical Exam     Vitals: /78  Pulse 69  Temp 98.4  F (36.9  C) (Oral)  Ht 5' 1.25\" (155.6 cm)  Wt 146 lb 9.6 oz (66.5 kg)  BMI 27.47 kg/m2  BMI= Body mass index is 27.47 kg/(m^2).  EXAM:  Constitutional: healthy, alert and no distress   Cardiovascular: negative, PMI normal. No lifts, heaves, or thrills. RRR. No murmurs, clicks gallops or rub  Respiratory: A few crackles at the right base, unlabored " breathing  Head: Normocephalic. No masses, lesions, tenderness or abnormalities  Neck: Neck supple. No adenopathy. Thyroid symmetric, normal size,  ENT: ENT exam normal, no neck nodes or sinus tenderness  Abdomen: Abdomen soft, non-tender. BS normal. No masses, organomegaly  : Deferred  NEURO: Gait normal. Reflexes normal and symmetric. Sensation grossly WNL.  SKIN: 7/10 and 8/10 monofilament testing on right and left feet respectively.        Assessment and Plan   Reviewed Preventive Services and Plan form with patient as specified in Patient Instructions.      Positive findings on assessment: Rafy was seen today for wellness visit.    Diagnoses and all orders for this visit:    Type 2 diabetes mellitus without complication, without long-term current use of insulin (H)  -     sitagliptin (JANUVIA) 50 MG tablet; Take one 50 gm tablet daily for one week and then take two 50 mg tablets after that  -     Hemoglobin A1c ([facility])  -     Basic Metabolic Panel ([place])    It was thought that part of the blurry vision as well as sensory changes and urinary changes. Plan was to start Januvia and have patient return next week to further address memory loss, balance, urine issues, weight loss. His A1c was actually normal though so then we called and asked that patient hold off on increasing Januvia.    Options for treatment and follow-up care were reviewed with the Rafy Adrian Riggins and/or guardian engaged in the decision making process and verbalized understanding of the options discussed and agreed with the final plan.    Abdi Will, DO

## 2018-03-16 ENCOUNTER — TELEPHONE (OUTPATIENT)
Dept: FAMILY MEDICINE | Facility: CLINIC | Age: 78
End: 2018-03-16

## 2018-03-16 NOTE — TELEPHONE ENCOUNTER
Called patient with language line Id# [id number] she tried to call the patient but while doing that she accidentally hung up on me.    Called the patient back using language line id# [id number], To leave a message with the patient that he needs to stop his new medication that was prescribed yesterday for him in clinic but the phone number on file is invalid. Will try calling the pharmacy to see if they have another number or check to see if they have picked up the medication.

## 2018-04-05 ENCOUNTER — OFFICE VISIT (OUTPATIENT)
Dept: FAMILY MEDICINE | Facility: CLINIC | Age: 78
End: 2018-04-05
Payer: COMMERCIAL

## 2018-04-05 VITALS
DIASTOLIC BLOOD PRESSURE: 71 MMHG | SYSTOLIC BLOOD PRESSURE: 129 MMHG | HEIGHT: 61 IN | TEMPERATURE: 97.9 F | OXYGEN SATURATION: 97 % | HEART RATE: 90 BPM | BODY MASS INDEX: 28.21 KG/M2 | WEIGHT: 149.4 LBS | RESPIRATION RATE: 16 BRPM

## 2018-04-05 DIAGNOSIS — N40.1 BENIGN PROSTATIC HYPERPLASIA WITH URINARY FREQUENCY: Primary | ICD-10-CM

## 2018-04-05 DIAGNOSIS — E11.9 TYPE 2 DIABETES MELLITUS WITHOUT COMPLICATION, WITHOUT LONG-TERM CURRENT USE OF INSULIN (H): ICD-10-CM

## 2018-04-05 DIAGNOSIS — R35.0 BENIGN PROSTATIC HYPERPLASIA WITH URINARY FREQUENCY: Primary | ICD-10-CM

## 2018-04-05 DIAGNOSIS — E78.5 HYPERLIPIDEMIA LDL GOAL <100: ICD-10-CM

## 2018-04-05 DIAGNOSIS — E03.9 HYPOTHYROIDISM, UNSPECIFIED TYPE: ICD-10-CM

## 2018-04-05 DIAGNOSIS — I10 ESSENTIAL HYPERTENSION WITH GOAL BLOOD PRESSURE LESS THAN 140/90: ICD-10-CM

## 2018-04-05 LAB
BILIRUBIN UR: NEGATIVE
BLOOD UR: NEGATIVE
GLUCOSE URINE: NEGATIVE
KETONES UR QL: NEGATIVE
LEUKOCYTE ESTERASE UR: NEGATIVE
NITRITE UR QL STRIP: NEGATIVE
PH UR STRIP: 5.5 [PH] (ref 5–7)
PROTEIN UR: NEGATIVE
SP GR UR STRIP: 1.02
UROBILINOGEN UR STRIP-ACNC: NORMAL

## 2018-04-05 RX ORDER — ATORVASTATIN CALCIUM 40 MG/1
40 TABLET, FILM COATED ORAL DAILY
Qty: 90 TABLET | Refills: 1 | Status: SHIPPED | OUTPATIENT
Start: 2018-04-05 | End: 2018-10-02

## 2018-04-05 RX ORDER — TAMSULOSIN HYDROCHLORIDE 0.4 MG/1
0.4 CAPSULE ORAL DAILY
Qty: 30 CAPSULE | Refills: 1 | Status: SHIPPED | OUTPATIENT
Start: 2018-04-05 | End: 2018-05-07 | Stop reason: SINTOL

## 2018-04-05 RX ORDER — LISINOPRIL 20 MG/1
20 TABLET ORAL DAILY
Qty: 90 TABLET | Refills: 3 | Status: SHIPPED | OUTPATIENT
Start: 2018-04-05 | End: 2019-02-22

## 2018-04-05 RX ORDER — LEVOTHYROXINE SODIUM 75 UG/1
75 TABLET ORAL DAILY
Qty: 90 TABLET | Refills: 3 | Status: SHIPPED | OUTPATIENT
Start: 2018-04-05 | End: 2019-02-22

## 2018-04-05 NOTE — PROGRESS NOTES
"Preceptor attestation:  Vital signs reviewed: /71 (BP Location: Left arm, Patient Position: Sitting, Cuff Size: Adult Regular)  Pulse 90  Temp 97.9  F (36.6  C) (Oral)  Resp 16  Ht 5' 1\" (154.9 cm)  Wt 149 lb 6.4 oz (67.8 kg)  SpO2 97%  BMI 28.23 kg/m2     Patient seen, evaluated and discussed with the resident.     Patient seen, evaluated, and discussed with the resident.  I have verified the content of the note, which accurately reflects my assessment of the patient and the plan of care.    Supervising physician: Lou Lange MD  [facility]    "

## 2018-04-05 NOTE — MR AVS SNAPSHOT
After Visit Summary   2018    Rafy Riggins    MRN: [id number]           Patient Information     Date Of Birth          1940        Visit Information        Provider Department      2018 11:00 AM Reid Christina DO [facility]        Today's Diagnoses     Benign prostatic hyperplasia with urinary frequency    -  1    Type 2 diabetes mellitus without complication, without long-term current use of insulin (H)        Hypothyroidism, unspecified type        Essential hypertension with goal blood pressure less than 140/90        Hyperlipidemia LDL goal <100          Care Instructions      Thank you for coming to clinic today.  Please do not hesitate to call or return if you have any questions.    -  new medications for frequent urination  - Continue other medications  - Follow-up within 1 month for recheck    Sincerely,  Dr. Christina            Follow-ups after your visit        Who to contact     Please call your clinic at [phone] to:    Ask questions about your health    Make or cancel appointments    Discuss your medicines    Learn about your test results    Speak to your doctor            Additional Information About Your Visit        [employer] Information     Bridgestream is an electronic gateway that provides easy, online access to your medical records. With Bridgestream, you can request a clinic appointment, read your test results, renew a prescription or communicate with your care team.     To sign up for Bridgestream visit the website at [email or web address]   You will be asked to enter the access code listed below, as well as some personal information. Please follow the directions to create your username and password.     Your access code is: [email or web address]  Expires: 2018  2:04 PM     Your access code will  in 90 days. If you need help or a new code, please contact your [facility] or call [phone] for assistance.        Care  "EveryWhere ID     This is your Care EveryWhere ID. This could be used by other organizations to access your [facility] medical records  [id number]        Your Vitals Were     Pulse Temperature Respirations Height Pulse Oximetry BMI (Body Mass Index)    90 97.9  F (36.6  C) (Oral) 16 5' 1\" (154.9 cm) 97% 28.23 kg/m2       Blood Pressure from Last 3 Encounters:   04/05/18 129/71   03/15/18 156/78   12/21/17 134/81    Weight from Last 3 Encounters:   04/05/18 149 lb 6.4 oz (67.8 kg)   03/15/18 146 lb 9.6 oz (66.5 kg)   12/21/17 151 lb (68.5 kg)              Today, you had the following     No orders found for display         Today's Medication Changes          These changes are accurate as of 4/5/18 11:54 AM.  If you have any questions, ask your nurse or doctor.               Start taking these medicines.        Dose/Directions    tamsulosin 0.4 MG capsule   Commonly known as:  FLOMAX   Used for:  Benign prostatic hyperplasia with urinary frequency   Started by:  Reid Christina DO        Dose:  0.4 mg   Take 1 capsule (0.4 mg) by mouth daily   Quantity:  30 capsule   Refills:  1            Where to get your medicines      These medications were sent to [facility] [address] [place] [address] [address] [facility] [address]  [address] [facility] [address]     Phone:  [phone]     atorvastatin 40 MG tablet    levothyroxine 75 MCG tablet    lisinopril 20 MG tablet    sitagliptin 50 MG tablet    tamsulosin 0.4 MG capsule                Primary Care Provider Office Phone # Fax #    Francisco Amaya [phone]       [facility] [address] [place] [address]        Equal Access to Services     GARETH JENSEN AH: Sergio Sibley, waluciada luqadaha, qaybta kaalmada nikc, luis m polk. So [facility] [phone].    ATENCIÓN: Si habla español, tiene a hughes disposición servicios gratuitos de asistencia " lingüística. Dimple al [phone].    We comply with applicable federal civil rights laws and [place] laws. We do not discriminate on the basis of race, color, national origin, age, disability, sex, sexual orientation, or gender identity.            Thank you!     Thank you for choosing [facility]  for your care. Our goal is always to provide you with excellent care. Hearing back from our patients is one way we can continue to improve our services. Please take a few minutes to complete the written survey that you may receive in the mail after your visit with us. Thank you!             Your Updated Medication List - Protect others around you: Learn how to safely use, store and throw away your medicines at www.disposemymeds.org.          This list is accurate as of 4/5/18 11:54 AM.  Always use your most recent med list.                   Brand Name Dispense Instructions for use Diagnosis    Adult Blood Pressure Cuff Lg Kit     1 kit    1 Device daily    Essential hypertension with goal blood pressure less than 140/90       allopurinol 100 MG tablet    ZYLOPRIM    30 tablet    Take 1 tablet (100 mg) by mouth daily    Chronic gout without tophus, unspecified cause, unspecified site       Ankle Stirrup Brace/Left Misc      Use as needed    Acute left ankle pain       aspirin 81 MG tablet     90 tablet    Take 1 tablet (81 mg) by mouth daily    Routine general medical examination at a health care facility       atorvastatin 40 MG tablet    LIPITOR    90 tablet    Take 1 tablet (40 mg) by mouth daily    Hyperlipidemia LDL goal <100       benzonatate 100 MG capsule    TESSALON    42 capsule    Take 1 capsule (100 mg) by mouth 3 times daily as needed for cough    Cough       gabapentin 100 MG capsule    NEURONTIN    60 capsule    Take 1 capsule (100 mg) by mouth 2 times daily    Neuropathy       ibuprofen 200 MG capsule     120 capsule    Take 400 mg by mouth every 8 hours as needed for pain    Chronic gout without  tophus, unspecified cause, unspecified site       levothyroxine 75 MCG tablet    SYNTHROID/LEVOTHROID    90 tablet    Take 1 tablet (75 mcg) by mouth daily    Hypothyroidism, unspecified type       lisinopril 20 MG tablet    PRINIVIL/ZESTRIL    90 tablet    Take 1 tablet (20 mg) by mouth daily    Essential hypertension with goal blood pressure less than 140/90       metFORMIN 500 MG tablet    GLUCOPHAGE    90 tablet    Take 2 tabs (1,000mg) by mouth in the morning with breakfast, and 1 tab (500mg) by mouth in the evening with dinner.    Type 2 diabetes mellitus without complication, without long-term current use of insulin (H)       multivitamin, therapeutic with minerals Tabs tablet     100 tablet    Take 1 tablet by mouth daily    Forgetfulness       ranitidine 150 MG tablet    ZANTAC    60 tablet    Take 1 tablet (150 mg) by mouth 2 times daily as needed for heartburn    Gastroesophageal reflux disease, esophagitis presence not specified       sitagliptin 50 MG tablet    JANUVIA    90 tablet    Take one 50 gm tablet daily for one week and then take two 50 mg tablets after that    Type 2 diabetes mellitus without complication, without long-term current use of insulin (H)       tamsulosin 0.4 MG capsule    FLOMAX    30 capsule    Take 1 capsule (0.4 mg) by mouth daily    Benign prostatic hyperplasia with urinary frequency

## 2018-04-05 NOTE — LETTER
April 18, 2018      Rafy Riggins  [address]  [facility] [address]        Dear Rafy,    Please see below for your test results.    Resulted Orders   Urinalysis, Micro If ([facility])   Result Value Ref Range    Specific Gravity Urine 1.020 1.005 - 1.030    pH Urine 5.5 4.5 - 8.0    Leukocyte Esterase UR Negative NEGATIVE    Nitrite Urine Negative NEGATIVE    Protein UR Negative NEGATIVE    Glucose Urine Negative NEGATIVE    Ketones Urine Negative NEGATIVE    Urobilinogen mg/dL 0.2 E.U./dL 0.2 E.U./dL    Bilirubin UR Negative NEGATIVE    Blood UR Negative NEGATIVE     Urine test was normal. You should follow-up in 1 month as discussed in clinic.    Thanks,  Dr. Christina

## 2018-04-05 NOTE — PATIENT INSTRUCTIONS
Thank you for coming to clinic today.  Please do not hesitate to call or return if you have any questions.    -  new medications for frequent urination  - Continue other medications  - Follow-up within 1 month for recheck    Sincerely,  Dr. Christina

## 2018-04-05 NOTE — PROGRESS NOTES
"       SUBJECTIVE       Rafy Riggins is a 78 year old  male with a PMH significant for:     Patient Active Problem List   Diagnosis     HTN (hypertension)     Back pain     Poor vision     S/P lumbar spinal fusion     Colon polyp     Diabetes mellitus, type 2 (H)     Obesity     Lumbosacral radiculopathy at S1     Gout of foot     Hypothyroidism     Patient presents with:  Wellness Visit: Medicare Wellness visit      He was scheduled as a medicare wellness visit, but actually had this done last visit and is here for follow-up.  He needs a few refills on medications, but otherwise has no concerns when initially asked.    We discussed his blurry vision today.  He reports that he sees an eye doctor yearly and is supposed to wear glasses as needed.  He did not bring them in today and did not wear them last visit.  His next appointment with them is in 1 month.    His frequent urination has been a big problem lately. He says this has been going on for years.  He has frequent issues with accidents and wakes up about 3 times per night to use the bathroom. No blood in the urine or pain with urination.  Does think he has a weak stream and sometimes has to strain to go.    There was some concern about weight loss last visit. He says his appetite has been fine.  Looking back in the chart  He has been around 150lbs since last year.    PMH, Medications and Allergies were reviewed and updated as needed.    ROS: No CP, SOB, edema        OBJECTIVE     Vitals:    04/05/18 1119   BP: 129/71   BP Location: Left arm   Patient Position: Sitting   Cuff Size: Adult Regular   Pulse: 90   Resp: 16   Temp: 97.9  F (36.6  C)   TempSrc: Oral   SpO2: 97%   Weight: 149 lb 6.4 oz (67.8 kg)   Height: 5' 1\" (154.9 cm)     Body mass index is 28.23 kg/(m^2).    General :  healthy and alert, no distress  HEENT:  PERRL  Cardiovascular: regular rate and rhythm, no gallops, rubs or murmurs   Respiratory:  lungs clear, no rales, rhonchi or " wheezes, normal diaphragmatic excursion  Musculoskeletal: no edema  Skin:   no lesions or rashes   Neurological:  normal gait, no gross defects  Psychiatric:  appropriate mood and affect                        VISION:  Far vision: Right eye 10/25, Left eye 10/32    Results for orders placed or performed in visit on 04/05/18   Urinalysis, Micro If ([facility])   Result Value Ref Range    Specific Gravity Urine 1.020 1.005 - 1.030    pH Urine 5.5 4.5 - 8.0    Leukocyte Esterase UR Negative NEGATIVE    Nitrite Urine Negative NEGATIVE    Protein UR Negative NEGATIVE    Glucose Urine Negative NEGATIVE    Ketones Urine Negative NEGATIVE    Urobilinogen mg/dL 0.2 E.U./dL 0.2 E.U./dL    Bilirubin UR Negative NEGATIVE    Blood UR Negative NEGATIVE     ASSESSMENT AND PLAN     (N40.1,  R35.0) Benign prostatic hyperplasia with urinary frequency  (primary encounter diagnosis)  Comment: Likely BPH by history.  UA obtained today and was normal.  We discussed doing a prostate exam and opted to wait until next visit.  Discussed pros/cons of medication trial and given the affect on his quality of life we opted to start flomax today.  F/u 1 month.   Plan: tamsulosin (FLOMAX) 0.4 MG capsule, Urinalysis,        Micro If ([facility])          (E11.9) Type 2 diabetes mellitus without complication, without long-term current use of insulin (H)  Comment: A1c well controlled last visit.  Prior to this it was >8.0 so the plan was to increase januvia to 100mg daily, but after his a1c returned lower they decided to stay at 50mg.  We discussed that this is a reasonable option for now given he has tolerated januvia and metformin well and they do not cause hypoglycemia.  Could consider stopping this in the future. On asa/statin. BP well controlled.  Plan: sitagliptin (JANUVIA) 50 MG tablet          (E03.9) Hypothyroidism, unspecified type  Comment: Requested refill.  Last TSH was 0.63 on 8/2/17.  Plan: levothyroxine (SYNTHROID/LEVOTHROID) 75 MCG          tablet          (I10) Essential hypertension with goal blood pressure less than 140/90  Comment: Well controlled, requested refill, stable creatinine in March.  Plan: lisinopril (PRINIVIL/ZESTRIL) 20 MG tablet          (E78.5) Hyperlipidemia LDL goal <100  Comment: Requested refill.  Plan: atorvastatin (LIPITOR) 40 MG tablet          RTC in 1 month for follow up or sooner if develops new or worsening symptoms.    Discussed with MD Reid Armstrong, DO PGY3  [facility]    This note was created with help of Dragon dictation system. Grammatical /typing errors are not intentional.

## 2018-04-18 ENCOUNTER — MEDICAL CORRESPONDENCE (OUTPATIENT)
Dept: HEALTH INFORMATION MANAGEMENT | Facility: CLINIC | Age: 78
End: 2018-04-18

## 2018-05-07 ENCOUNTER — OFFICE VISIT (OUTPATIENT)
Dept: FAMILY MEDICINE | Facility: CLINIC | Age: 78
End: 2018-05-07
Payer: COMMERCIAL

## 2018-05-07 VITALS
OXYGEN SATURATION: 94 % | BODY MASS INDEX: 28.32 KG/M2 | RESPIRATION RATE: 16 BRPM | WEIGHT: 150 LBS | DIASTOLIC BLOOD PRESSURE: 72 MMHG | HEIGHT: 61 IN | SYSTOLIC BLOOD PRESSURE: 123 MMHG | TEMPERATURE: 98.3 F | HEART RATE: 87 BPM

## 2018-05-07 DIAGNOSIS — E11.9 TYPE 2 DIABETES MELLITUS WITHOUT COMPLICATION, WITHOUT LONG-TERM CURRENT USE OF INSULIN (H): ICD-10-CM

## 2018-05-07 DIAGNOSIS — N40.1 BENIGN PROSTATIC HYPERPLASIA WITH URINARY FREQUENCY: Primary | ICD-10-CM

## 2018-05-07 DIAGNOSIS — R35.0 BENIGN PROSTATIC HYPERPLASIA WITH URINARY FREQUENCY: Primary | ICD-10-CM

## 2018-05-07 DIAGNOSIS — T50.905A MEDICATION SIDE EFFECTS, INITIAL ENCOUNTER: ICD-10-CM

## 2018-05-07 NOTE — MR AVS SNAPSHOT
After Visit Summary   5/7/2018    Rafy Riggins    MRN: [id number]           Patient Information     Date Of Birth          1940        Visit Information        Provider Department      5/7/2018 11:20 AM Reid Christina DO [facility]        Today's Diagnoses     Benign prostatic hyperplasia with urinary frequency    -  1    Type 2 diabetes mellitus without complication, without long-term current use of insulin (H)        Medication side effects, initial encounter          Care Instructions    Thank you for coming to clinic today.  Please do not hesitate to call or return if you have any questions.    - STOP flomax (tamsulosin) and januvia  - Follow-up within a few months for recheck, or sooner if needed    Sincerely,  Dr. Christina            Follow-ups after your visit        Additional Services     UROLOGY ADULT REFERRAL       Patient to stop at the [facility] Desk    Reason for Referral: urinary frequency, incontinent. Intolerant to flomax.     needed: Yes  Language: Tamazight    May leave message on voicemail: No    ([place] Only) Referral should be tracked (Yes/No)?                  Future tests that were ordered for you today     Open Future Orders        Priority Expected Expires Ordered    UROLOGY ADULT REFERRAL Routine  7/7/2018 5/7/2018            Who to contact     Please call your clinic at [phone] to:    Ask questions about your health    Make or cancel appointments    Discuss your medicines    Learn about your test results    Speak to your doctor            Additional Information About Your Visit        MyChart Information     [employer] is an electronic gateway that provides easy, online access to your medical records. With [employer], you can request a clinic appointment, read your test results, renew a prescription or communicate with your care team.     To sign up for [employer] visit the website at [email or web address]   You will be asked to enter the access  "code listed below, as well as some personal information. Please follow the directions to create your username and password.     Your access code is: [email or web address]  Expires: 2018  2:04 PM     Your access code will  in 90 days. If you need help or a new code, please contact your [facility] or call [phone] for assistance.        Care EveryWhere ID     This is your Care EveryWhere ID. This could be used by other organizations to access your [place] medical records  [id number]        Your Vitals Were     Pulse Temperature Respirations Height Pulse Oximetry BMI (Body Mass Index)    87 98.3  F (36.8  C) (Oral) 16 5' 1.1\" (155.2 cm) 94% 28.25 kg/m2       Blood Pressure from Last 3 Encounters:   18 123/72   18 129/71   03/15/18 156/78    Weight from Last 3 Encounters:   18 150 lb (68 kg)   18 149 lb 6.4 oz (67.8 kg)   03/15/18 146 lb 9.6 oz (66.5 kg)                 Today's Medication Changes          These changes are accurate as of 18 11:55 AM.  If you have any questions, ask your nurse or doctor.               Stop taking these medicines if you haven't already. Please contact your care team if you have questions.     sitagliptin 50 MG tablet   Commonly known as:  JANUVIA   Stopped by:  Reid Christina DO           tamsulosin 0.4 MG capsule   Commonly known as:  FLOMAX   Stopped by:  Reid Christina DO                    Primary Care Provider Office Phone # Fax #    Francisco Saeed Amaya [phone]       [facility] [address] [place] [address]        Equal Access to Services     CHRISTOFER JENSEN AH: Sergio Sibley, lupillo jeffrey, qacori kaalmaluis m child. So [facility] [phone].    ATENCIÓN: Si habla español, tiene a hughes disposición servicios gratuitos de asistencia lingüística. Llame al [phone].    We comply with applicable federal civil rights " laws and [place] laws. We do not discriminate on the basis of race, color, national origin, age, disability, sex, sexual orientation, or gender identity.            Thank you!     Thank you for choosing [facility]  for your care. Our goal is always to provide you with excellent care. Hearing back from our patients is one way we can continue to improve our services. Please take a few minutes to complete the written survey that you may receive in the mail after your visit with us. Thank you!             Your Updated Medication List - Protect others around you: Learn how to safely use, store and throw away your medicines at www.disposemymeds.org.          This list is accurate as of 5/7/18 11:55 AM.  Always use your most recent med list.                   Brand Name Dispense Instructions for use Diagnosis    Adult Blood Pressure Cuff Lg Kit     1 kit    1 Device daily    Essential hypertension with goal blood pressure less than 140/90       allopurinol 100 MG tablet    ZYLOPRIM    30 tablet    Take 1 tablet (100 mg) by mouth daily    Chronic gout without tophus, unspecified cause, unspecified site       Ankle Stirrup Brace/Left Misc      Use as needed    Acute left ankle pain       aspirin 81 MG tablet     90 tablet    Take 1 tablet (81 mg) by mouth daily    Routine general medical examination at a health care facility       atorvastatin 40 MG tablet    LIPITOR    90 tablet    Take 1 tablet (40 mg) by mouth daily    Hyperlipidemia LDL goal <100       gabapentin 100 MG capsule    NEURONTIN    60 capsule    Take 1 capsule (100 mg) by mouth 2 times daily    Neuropathy       ibuprofen 200 MG capsule     120 capsule    Take 400 mg by mouth every 8 hours as needed for pain    Chronic gout without tophus, unspecified cause, unspecified site       levothyroxine 75 MCG tablet    SYNTHROID/LEVOTHROID    90 tablet    Take 1 tablet (75 mcg) by mouth daily    Hypothyroidism, unspecified type       lisinopril 20 MG tablet     PRINIVIL/ZESTRIL    90 tablet    Take 1 tablet (20 mg) by mouth daily    Essential hypertension with goal blood pressure less than 140/90       metFORMIN 500 MG tablet    GLUCOPHAGE    90 tablet    Take 2 tabs (1,000mg) by mouth in the morning with breakfast, and 1 tab (500mg) by mouth in the evening with dinner.    Type 2 diabetes mellitus without complication, without long-term current use of insulin (H)       multivitamin, therapeutic with minerals Tabs tablet     100 tablet    Take 1 tablet by mouth daily    Forgetfulness       ranitidine 150 MG tablet    ZANTAC    60 tablet    Take 1 tablet (150 mg) by mouth 2 times daily as needed for heartburn    Gastroesophageal reflux disease, esophagitis presence not specified

## 2018-05-07 NOTE — PATIENT INSTRUCTIONS
Thank you for coming to clinic today.  Please do not hesitate to call or return if you have any questions.    - STOP flomax (tamsulosin) and januvia  - Follow-up within a few months for recheck, or sooner if needed    Sincerely,  Dr. Christina    Thank you for your referral!  Thank you for your submission. We will respond to you as soon as possible.  Referral submitted to [facility].  They will contact patient to schedule.  Teresa  05/08/18

## 2018-05-07 NOTE — PROGRESS NOTES
Preceptor Attestation:   Patient seen, evaluated and discussed with the resident. I have verified the content of the note, which accurately reflects my assessment of the patient and the plan of care.   Supervising Physician:  Braeden Slaughter MD

## 2018-05-07 NOTE — NURSING NOTE
Due to patient being non-English speaking/uses sign language, an  was used for this visit. Only for face-to-face interpretation by an external agency, date and length of interpretation can be found on the scanned worksheet.     name: Rick  Agency: [employer]  Language: Maciej  Telephone number: [phone]  Type of interpretation: Face-to-face, spoken

## 2018-05-07 NOTE — PROGRESS NOTES
"       SUBJECTIVE       Rafy Riggins is a 78 year old  male with a PMH significant for:     Patient Active Problem List   Diagnosis     HTN (hypertension)     Back pain     Poor vision     S/P lumbar spinal fusion     Colon polyp     Diabetes mellitus, type 2 (H)     Obesity     Lumbosacral radiculopathy at S1     Gout of foot     Hypothyroidism     Patient presents with:  RECHECK: Pt is here to follow up DM  Recheck Medication: Pt is feeling dizzie for the past month. Pt belives it might be the medication. Pt is wondering if he can cut some medication out.       He presents for follow-up of last visit.  He had been having a lot of nighttime urination and intermittent incontinence episodes that I felt was due to BPH so we started him on tamsulosin.  He says it is been minimally helpful and he is still having to get up 3 times on average per night to urinate and has had a few more incontinence episodes.  Since being on that medicine he has been dizzy intermittently throughout the day.  He is wondering if this is a medicine side effect and if he can stop any other medicines.    His diabetes has been very well controlled.  He is taking metformin and Januvia without any issues.  His last A1c was 6.52 months ago.    PMH, Medications and Allergies were reviewed and updated as needed.    ROS: No edema, shortness of breath        OBJECTIVE     Vitals:    05/07/18 1137   BP: 123/72   Pulse: 87   Resp: 16   Temp: 98.3  F (36.8  C)   TempSrc: Oral   SpO2: 94%   Weight: 150 lb (68 kg)   Height: 5' 1.1\" (155.2 cm)     Body mass index is 28.25 kg/(m^2).    General :  healthy and alert, no distress  HEENT:  PERRL  Cardiovascular: regular rate and rhythm, no gallops, rubs or murmurs   Respiratory:  lungs clear, no rales, rhonchi or wheezes, normal diaphragmatic excursion  Musculoskeletal: no edema  Skin:   no lesions or rashes   Neurological:  normal gait, no gross defects  Psychiatric:  appropriate mood and affect         "                ASSESSMENT AND PLAN     (N40.1,  R35.0) Benign prostatic hyperplasia with urinary frequency  (primary encounter diagnosis) / (T88.7XXA) Medication side effects, initial encounter  Comment: I think his dizziness is probably due to Flomax. This has not been very helpful so I think it makes sense to stop this today.  He is still having significant symptoms that are affecting his daily life so I offered a urology referral to discuss other options which he accepted.  Plan: UROLOGY ADULT REFERRAL    (E11.9) Type 2 diabetes mellitus without complication, without long-term current use of insulin (H)  Comment/Plan: Well-controlled, last A1c was 6.5 2 months ago.  We discussed that stopping Januvia would be reasonable at this point she will reduce the amount of medicines he was taking.  He will follow-up in a few months for diabetes and consider repeat A1c at that time.  We will keep him on aspirin and statin at this time, but could have a discussion in the future about stopping these at some point.    RTC in 1 month for follow up (patient prefers a monthly visit) or sooner if develops new or worsening symptoms.    Discussed with MD Reid Cox, DO PGY3  [facility]    This note was created with help of Dragon dictation system. Grammatical /typing errors are not intentional.

## 2018-05-31 ENCOUNTER — MEDICAL CORRESPONDENCE (OUTPATIENT)
Dept: HEALTH INFORMATION MANAGEMENT | Facility: CLINIC | Age: 78
End: 2018-05-31

## 2018-06-01 DIAGNOSIS — M1A.9XX0 CHRONIC GOUT WITHOUT TOPHUS, UNSPECIFIED CAUSE, UNSPECIFIED SITE: ICD-10-CM

## 2018-06-01 DIAGNOSIS — E11.9 TYPE 2 DIABETES MELLITUS WITHOUT COMPLICATION, WITHOUT LONG-TERM CURRENT USE OF INSULIN (H): ICD-10-CM

## 2018-06-01 RX ORDER — OMEGA-3 FATTY ACIDS/FISH OIL 300-1000MG
400 CAPSULE ORAL EVERY 8 HOURS PRN
Qty: 120 CAPSULE | Refills: 0 | Status: SHIPPED | OUTPATIENT
Start: 2018-06-01 | End: 2018-07-03

## 2018-06-01 NOTE — TELEPHONE ENCOUNTER
Pharmacy request for Tamsulosin HCL 0.4 mg cap. Please advise medication is not on patient's med list.    Laly De Dios, CMA

## 2018-06-11 ENCOUNTER — OFFICE VISIT (OUTPATIENT)
Dept: FAMILY MEDICINE | Facility: CLINIC | Age: 78
End: 2018-06-11
Payer: COMMERCIAL

## 2018-06-11 VITALS
BODY MASS INDEX: 28.21 KG/M2 | TEMPERATURE: 98.8 F | DIASTOLIC BLOOD PRESSURE: 74 MMHG | OXYGEN SATURATION: 97 % | RESPIRATION RATE: 16 BRPM | SYSTOLIC BLOOD PRESSURE: 126 MMHG | HEART RATE: 80 BPM | WEIGHT: 149.8 LBS

## 2018-06-11 DIAGNOSIS — N40.1 BENIGN PROSTATIC HYPERPLASIA WITH URINARY FREQUENCY: ICD-10-CM

## 2018-06-11 DIAGNOSIS — E11.9 TYPE 2 DIABETES MELLITUS WITHOUT COMPLICATION, WITHOUT LONG-TERM CURRENT USE OF INSULIN (H): ICD-10-CM

## 2018-06-11 DIAGNOSIS — M54.5 RIGHT LOW BACK PAIN, UNSPECIFIED CHRONICITY, WITH SCIATICA PRESENCE UNSPECIFIED: Primary | ICD-10-CM

## 2018-06-11 DIAGNOSIS — R35.0 BENIGN PROSTATIC HYPERPLASIA WITH URINARY FREQUENCY: ICD-10-CM

## 2018-06-11 RX ORDER — LIDOCAINE 50 MG/G
OINTMENT TOPICAL 3 TIMES DAILY PRN
Qty: 240 G | Refills: 1 | Status: SHIPPED | OUTPATIENT
Start: 2018-06-11 | End: 2019-03-18

## 2018-06-11 NOTE — PROGRESS NOTES
SUBJECTIVE       Rafy Riggins is a 78 year old  male with a PMH significant for:     Patient Active Problem List   Diagnosis     HTN (hypertension)     Back pain     Poor vision     S/P lumbar spinal fusion     Colon polyp     Diabetes mellitus, type 2 (H)     Obesity     Lumbosacral radiculopathy at S1     Gout of foot     Hypothyroidism     Patient presents with:  RECHECK: Pt is here to follow up from last visit.   Pain: Pt had back pain from previous surgery 2 years ago.      1) Last visit we opted to stop his Januvia since he preferred to be on less medications and his A1c was 6.5.  He has been doing well since then and has no new concerns regarding his diabetes.  He met with an eye doctor month and a half ago and had a reassuring exam.  He is taking metformin daily.    2) For the last 2 weeks he has had worsening right lower back pain that radiates down the right leg.  He had a similar problem in 2016 required spine surgery.  He was seen at [facility] at that time and had been doing well until recently.  He says his chronic weakness in the right leg that is maybe a little worse over the last 2 weeks.  No new numbness or tingling.  No loss of bowel or bladder control.    3) Last visit he had side effects on Flomax that was used to treat his BPH symptoms so I opted to stop it and refer him to urology.  He says no one ever called and set that appointment and he is still getting up to urinate 3 times at night.    PMH, Medications and Allergies were reviewed and updated as needed.    ROS: as above        OBJECTIVE     Vitals:    06/11/18 1444   BP: 126/74   Pulse: 80   Resp: 16   Temp: 98.8  F (37.1  C)   TempSrc: Oral   SpO2: 97%   Weight: 149 lb 12.8 oz (67.9 kg)     Body mass index is 28.21 kg/(m^2).    General :  healthy and alert, no distress  HEENT:  PERRL  Cardiovascular: regular rate and rhythm, no gallops, rubs or murmurs   Respiratory:  lungs clear, no rales, rhonchi or wheezes,  normal diaphragmatic excursion  Skin:   no lesions or rashes   Neurological:  4/5 RLE, 5/5 on left, normal sensation, normal reflexes  Psychiatric:  appropriate mood and affect                        ASSESSMENT AND PLAN     (M54.5) Right low back pain, unspecified chronicity, with sciatica presence unspecified  (primary encounter diagnosis)  Comment: Recurrent symptoms over the past 2 weeks similar to what he had in 2016.  He had a synovial cyst that was removed at that time and an L4-L5 fusion.  He had a ONDINA at that time that was helpful as well in the last note says to come back for recurrent symptoms to consider this again.  He has not seen them in almost 2 years and given his recurrence of symptoms will refer him back.  No signs of cauda equina to warrant emergent imaging at this time.  Return precautions discussed.  Plan: lidocaine (XYLOCAINE) 5 % ointment, ORTHOPEDICS        ADULT REFERRAL    (E11.9) Type 2 diabetes mellitus without complication, without long-term current use of insulin (H)  Comment/Plan: Doing well on current regimen.  Since I just made a change 1 month ago I will hold off for 2 more months before checking another A1c.  We will keep him on metformin, aspirin, and statin for now.  Up-to-date on other preventative recommendations for diabetes.      (N40.1,  R35.0) Benign prostatic hyperplasia with urinary frequency  Comment/Plan: Still urinating 3 times per night.  Referred to urology last visit given intolerance to Flomax, but was never contacted.  Met with referral coronary today set this up.    RTC in 2 month for follow up or sooner if develops new or worsening symptoms.    Discussed with MD Reid Medeiros,  PGY3  [facility]    This note was created with help of Dragon dictation system. Grammatical /typing errors are not intentional.

## 2018-06-11 NOTE — PROGRESS NOTES
Preceptor Attestation:   Patient seen, evaluated and discussed with the resident. I have verified the content of the note, which accurately reflects my assessment of the patient and the plan of care.   Supervising Physician:  Elma Capone MD

## 2018-06-11 NOTE — MR AVS SNAPSHOT
After Visit Summary   6/11/2018    Rafy Riggins    MRN: [id number]           Patient Information     Date Of Birth          1940        Visit Information        Provider Department      6/11/2018 2:30 PM Reid Christina DO [facility]        Today's Diagnoses     Right low back pain, unspecified chronicity, with sciatica presence unspecified    -  1    Type 2 diabetes mellitus without complication, without long-term current use of insulin (H)        Benign prostatic hyperplasia with urinary frequency          Care Instructions    Thank you for coming to clinic today.  Please do not hesitate to call or return if you have any questions.    - Schedule referrals for back and frequent urination  - Follow-up in 2 months for diabetes check    Sincerely,  Dr. Christina            Follow-ups after your visit        Additional Services     ORTHOPEDICS ADULT REFERRAL       Patient to stop at the [facility] Desk    Reason for Referral: Right low back pain, hx spine surgery 2016, recurrent symptoms  Referral Location: [facility]: [phone]     needed: Yes  Language: Kyrgyz    May leave message on voicemail: No    ([facility] Only) Referral should be tracked (Yes/No)?                  Future tests that were ordered for you today     Open Future Orders        Priority Expected Expires Ordered    ORTHOPEDICS ADULT REFERRAL Routine  7/11/2018 6/11/2018            Who to contact     Please call your clinic at [phone] to:    Ask questions about your health    Make or cancel appointments    Discuss your medicines    Learn about your test results    Speak to your doctor            Additional Information About Your Visit        MyChart Information     [employer] is an electronic gateway that provides easy, online access to your medical records. With [employer], you can request a clinic appointment, read your test results, renew a prescription or communicate with your care team.     To sign up  for MyChart visit the website at [email or web address]   You will be asked to enter the access code listed below, as well as some personal information. Please follow the directions to create your username and password.     Your access code is: [email or web address]  Expires: 2018  2:04 PM     Your access code will  in 90 days. If you need help or a new code, please contact your [facility] or call [phone] for assistance.        Care EveryWhere ID     This is your Care EveryWhere ID. This could be used by other organizations to access your [facility] medical records  [id number]        Your Vitals Were     Pulse Temperature Respirations Pulse Oximetry BMI (Body Mass Index)       80 98.8  F (37.1  C) (Oral) 16 97% 28.21 kg/m2        Blood Pressure from Last 3 Encounters:   18 126/74   18 123/72   18 129/71    Weight from Last 3 Encounters:   18 149 lb 12.8 oz (67.9 kg)   18 150 lb (68 kg)   18 149 lb 6.4 oz (67.8 kg)                 Today's Medication Changes          These changes are accurate as of 18  3:05 PM.  If you have any questions, ask your nurse or doctor.               Start taking these medicines.        Dose/Directions    lidocaine 5 % ointment   Commonly known as:  XYLOCAINE   Used for:  Right low back pain, unspecified chronicity, with sciatica presence unspecified   Started by:  Reid Christina, DO        Apply topically 3 times daily as needed for moderate pain   Quantity:  240 g   Refills:  1            Where to get your medicines      These medications were sent to [facility] [place] - [address]  [address] [address], [place] [address]     Phone:  [phone]     lidocaine 5 % ointment                Primary Care Provider Office Phone # Fax #    Francisco Amaya [phone]       [facility] [address] [place]  [address]        Equal Access to Services     [facility] : Hadii derek mai alpeshninoska Nonaali, waluciada luqadaha, qaybta kaashishadilene romero, luis m polk. So [facility] [phone].    ATENCIÓN: Si habla español, tiene a hughes disposición servicios gratuitos de asistencia lingüística. Jayantame al [phone].    We comply with applicable federal civil rights laws and [place] laws. We do not discriminate on the basis of race, color, national origin, age, disability, sex, sexual orientation, or gender identity.            Thank you!     Thank you for choosing [facility]  for your care. Our goal is always to provide you with excellent care. Hearing back from our patients is one way we can continue to improve our services. Please take a few minutes to complete the written survey that you may receive in the mail after your visit with us. Thank you!             Your Updated Medication List - Protect others around you: Learn how to safely use, store and throw away your medicines at www.disposemymeds.org.          This list is accurate as of 6/11/18  3:05 PM.  Always use your most recent med list.                   Brand Name Dispense Instructions for use Diagnosis    Adult Blood Pressure Cuff Lg Kit     1 kit    1 Device daily    Essential hypertension with goal blood pressure less than 140/90       allopurinol 100 MG tablet    ZYLOPRIM    30 tablet    Take 1 tablet (100 mg) by mouth daily    Chronic gout without tophus, unspecified cause, unspecified site       Ankle Stirrup Brace/Left Misc      Use as needed    Acute left ankle pain       aspirin 81 MG tablet     90 tablet    Take 1 tablet (81 mg) by mouth daily    Routine general medical examination at a health care facility       atorvastatin 40 MG tablet    LIPITOR    90 tablet    Take 1 tablet (40 mg) by mouth daily    Hyperlipidemia LDL goal <100       gabapentin 100 MG capsule    NEURONTIN    60 capsule    Take 1 capsule (100 mg) by mouth 2 times  daily    Neuropathy       ibuprofen 200 MG capsule     120 capsule    Take 400 mg by mouth every 8 hours as needed for pain    Chronic gout without tophus, unspecified cause, unspecified site       levothyroxine 75 MCG tablet    SYNTHROID/LEVOTHROID    90 tablet    Take 1 tablet (75 mcg) by mouth daily    Hypothyroidism, unspecified type       lidocaine 5 % ointment    XYLOCAINE    240 g    Apply topically 3 times daily as needed for moderate pain    Right low back pain, unspecified chronicity, with sciatica presence unspecified       lisinopril 20 MG tablet    PRINIVIL/ZESTRIL    90 tablet    Take 1 tablet (20 mg) by mouth daily    Essential hypertension with goal blood pressure less than 140/90       metFORMIN 500 MG tablet    GLUCOPHAGE    90 tablet    Take 2 tabs (1,000mg) by mouth in the morning with breakfast, and 1 tab (500mg) by mouth in the evening with dinner.    Type 2 diabetes mellitus without complication, without long-term current use of insulin (H)       multivitamin, therapeutic with minerals Tabs tablet     100 tablet    Take 1 tablet by mouth daily    Forgetfulness       ranitidine 150 MG tablet    ZANTAC    60 tablet    Take 1 tablet (150 mg) by mouth 2 times daily as needed for heartburn    Gastroesophageal reflux disease, esophagitis presence not specified

## 2018-06-11 NOTE — NURSING NOTE
Due to patient being non-English speaking/uses sign language, an  was used for this visit. Only for face-to-face interpretation by an external agency, date and length of interpretation can be found on the scanned worksheet.     name: Rick Vázquez  Agency: [employer]  Language: Yi  Telephone number: [phone]  Type of interpretation: Face-to-face, spoken          nterpreter name: Rick Vázquez  Language: Yi  Agency: [employer]  Phone number: [phone]

## 2018-06-11 NOTE — PATIENT INSTRUCTIONS
Thank you for coming to clinic today.  Please do not hesitate to call or return if you have any questions.    - Schedule referrals for back and frequent urination  - Follow-up in 2 months for diabetes check    Sincerely,  Dr. Christina    Referral to [facility]    [address] [place] [id number]  Appointments: [phone]    Date: Tuesday June 19, 2018  Arrival Time: 1:45 pm  Provider: MILLIE Reynoso    Please bring a copy of your insurance card and photo ID    A copy of the referral, facesheet and office visit notes have been faxed to [phone]    Ashlie Armas CMA, Referral Coordinator       Referral for Urology     [address], [address]  [place] [address]  Phone: [phone]    Appointment: Thursday June 21, 2018  Arrival Time: 2:30 pm  Provider:  Dr. Moyer    Please bring in a copy of your insurance card and photo ID    Referral, office notes and labs faxed to [phone]     [facility]/June 11, 2018 @ 3:28 pm

## 2018-06-12 ENCOUNTER — MEDICAL CORRESPONDENCE (OUTPATIENT)
Dept: HEALTH INFORMATION MANAGEMENT | Facility: CLINIC | Age: 78
End: 2018-06-12

## 2018-06-19 ENCOUNTER — RECORDS - HEALTHEAST (OUTPATIENT)
Dept: ADMINISTRATIVE | Facility: OTHER | Age: 78
End: 2018-06-19

## 2018-06-19 ENCOUNTER — TRANSFERRED RECORDS (OUTPATIENT)
Dept: HEALTH INFORMATION MANAGEMENT | Facility: CLINIC | Age: 78
End: 2018-06-19

## 2018-06-21 ENCOUNTER — TRANSFERRED RECORDS (OUTPATIENT)
Dept: HEALTH INFORMATION MANAGEMENT | Facility: CLINIC | Age: 78
End: 2018-06-21

## 2018-06-22 ENCOUNTER — MEDICAL CORRESPONDENCE (OUTPATIENT)
Dept: HEALTH INFORMATION MANAGEMENT | Facility: CLINIC | Age: 78
End: 2018-06-22

## 2018-06-22 DIAGNOSIS — K21.9 GASTROESOPHAGEAL REFLUX DISEASE, ESOPHAGITIS PRESENCE NOT SPECIFIED: ICD-10-CM

## 2018-06-22 DIAGNOSIS — R68.89 FORGETFULNESS: ICD-10-CM

## 2018-06-22 RX ORDER — MULTIPLE VITAMINS W/ MINERALS TAB 9MG-400MCG
1 TAB ORAL DAILY
Qty: 100 TABLET | Refills: 3 | Status: SHIPPED | OUTPATIENT
Start: 2018-06-22 | End: 2019-06-11

## 2018-07-03 DIAGNOSIS — M1A.9XX0 CHRONIC GOUT WITHOUT TOPHUS, UNSPECIFIED CAUSE, UNSPECIFIED SITE: ICD-10-CM

## 2018-07-03 RX ORDER — OMEGA-3 FATTY ACIDS/FISH OIL 300-1000MG
400 CAPSULE ORAL EVERY 8 HOURS PRN
Qty: 120 CAPSULE | Refills: 0 | Status: SHIPPED | OUTPATIENT
Start: 2018-07-03 | End: 2018-09-06

## 2018-07-03 RX ORDER — TAMSULOSIN HYDROCHLORIDE 0.4 MG/1
0.4 CAPSULE ORAL DAILY
Qty: 30 CAPSULE | Refills: 1 | COMMUNITY
Start: 2018-07-03 | End: 2018-07-16

## 2018-07-16 DIAGNOSIS — N40.0 BENIGN PROSTATIC HYPERPLASIA, UNSPECIFIED WHETHER LOWER URINARY TRACT SYMPTOMS PRESENT: Primary | ICD-10-CM

## 2018-07-17 RX ORDER — TAMSULOSIN HYDROCHLORIDE 0.4 MG/1
0.4 CAPSULE ORAL DAILY
Qty: 30 CAPSULE | Refills: 1 | Status: SHIPPED | OUTPATIENT
Start: 2018-07-17 | End: 2018-09-07

## 2018-07-19 DIAGNOSIS — G62.9 NEUROPATHY: ICD-10-CM

## 2018-07-20 RX ORDER — GABAPENTIN 100 MG/1
100 CAPSULE ORAL 2 TIMES DAILY
Qty: 60 CAPSULE | Refills: 3 | Status: SHIPPED | OUTPATIENT
Start: 2018-07-20 | End: 2018-08-20

## 2018-07-26 ENCOUNTER — TELEPHONE (OUTPATIENT)
Dept: FAMILY MEDICINE | Facility: CLINIC | Age: 78
End: 2018-07-26

## 2018-07-26 NOTE — TELEPHONE ENCOUNTER
Pt is requesting a refill for Januvia 50 mg tab but I don't see it on his current med list. Also requesting 90 day supply w/ 3 refills. Please advise. Nikolai Rogers MA

## 2018-08-09 ENCOUNTER — TRANSFERRED RECORDS (OUTPATIENT)
Dept: HEALTH INFORMATION MANAGEMENT | Facility: CLINIC | Age: 78
End: 2018-08-09

## 2018-08-13 ENCOUNTER — MEDICAL CORRESPONDENCE (OUTPATIENT)
Dept: HEALTH INFORMATION MANAGEMENT | Facility: CLINIC | Age: 78
End: 2018-08-13

## 2018-08-16 ENCOUNTER — TRANSFERRED RECORDS (OUTPATIENT)
Dept: HEALTH INFORMATION MANAGEMENT | Facility: CLINIC | Age: 78
End: 2018-08-16

## 2018-08-16 ENCOUNTER — TELEPHONE (OUTPATIENT)
Dept: FAMILY MEDICINE | Facility: CLINIC | Age: 78
End: 2018-08-16

## 2018-08-16 DIAGNOSIS — G62.9 NEUROPATHY: ICD-10-CM

## 2018-08-16 NOTE — TELEPHONE ENCOUNTER
[facility] phone call message- general phone call:    Reason for call: Pt is currently taking Gabapentin 100 mg twice daily and is wondering if the dose can be increased or the frequency. If this is approved send rx to [facility].     Return call needed: Yes if questions     OK to leave a message on voice mail? Yes    Primary language: [facility]      needed? Yes    Call taken on August 16, 2018 at 11:18 AM by Sofi Huntley

## 2018-08-20 ENCOUNTER — MEDICAL CORRESPONDENCE (OUTPATIENT)
Dept: HEALTH INFORMATION MANAGEMENT | Facility: CLINIC | Age: 78
End: 2018-08-20

## 2018-08-20 RX ORDER — GABAPENTIN 100 MG/1
100 CAPSULE ORAL 3 TIMES DAILY
Qty: 90 CAPSULE | Refills: 3 | Status: SHIPPED | OUTPATIENT
Start: 2018-08-20 | End: 2018-12-17

## 2018-09-04 ENCOUNTER — MEDICAL CORRESPONDENCE (OUTPATIENT)
Dept: HEALTH INFORMATION MANAGEMENT | Facility: CLINIC | Age: 78
End: 2018-09-04

## 2018-09-06 DIAGNOSIS — N40.0 BENIGN PROSTATIC HYPERPLASIA, UNSPECIFIED WHETHER LOWER URINARY TRACT SYMPTOMS PRESENT: ICD-10-CM

## 2018-09-06 DIAGNOSIS — M1A.9XX0 CHRONIC GOUT WITHOUT TOPHUS, UNSPECIFIED CAUSE, UNSPECIFIED SITE: ICD-10-CM

## 2018-09-07 ENCOUNTER — MEDICAL CORRESPONDENCE (OUTPATIENT)
Dept: HEALTH INFORMATION MANAGEMENT | Facility: CLINIC | Age: 78
End: 2018-09-07

## 2018-09-07 RX ORDER — OMEGA-3 FATTY ACIDS/FISH OIL 300-1000MG
400 CAPSULE ORAL EVERY 8 HOURS PRN
Qty: 120 CAPSULE | Refills: 0 | Status: SHIPPED | OUTPATIENT
Start: 2018-09-07 | End: 2018-10-17

## 2018-09-07 RX ORDER — TAMSULOSIN HYDROCHLORIDE 0.4 MG/1
0.4 CAPSULE ORAL DAILY
Qty: 30 CAPSULE | Refills: 1 | Status: SHIPPED | OUTPATIENT
Start: 2018-09-07 | End: 2018-11-09

## 2018-09-11 ENCOUNTER — MEDICAL CORRESPONDENCE (OUTPATIENT)
Dept: HEALTH INFORMATION MANAGEMENT | Facility: CLINIC | Age: 78
End: 2018-09-11

## 2018-10-01 ENCOUNTER — MEDICAL CORRESPONDENCE (OUTPATIENT)
Dept: HEALTH INFORMATION MANAGEMENT | Facility: CLINIC | Age: 78
End: 2018-10-01

## 2018-10-02 DIAGNOSIS — E78.5 HYPERLIPIDEMIA LDL GOAL <100: ICD-10-CM

## 2018-10-04 ENCOUNTER — TELEPHONE (OUTPATIENT)
Dept: FAMILY MEDICINE | Facility: CLINIC | Age: 78
End: 2018-10-04

## 2018-10-04 RX ORDER — ATORVASTATIN CALCIUM 40 MG/1
40 TABLET, FILM COATED ORAL DAILY
Qty: 90 TABLET | Refills: 1 | Status: SHIPPED | OUTPATIENT
Start: 2018-10-04 | End: 2019-02-22

## 2018-10-04 NOTE — TELEPHONE ENCOUNTER
Pharmacy requesting 90 day supply refill For Januvia 50 mg but I don't see it on pt's current medication list. Please advise. Nikolai Rogers MA

## 2018-10-05 NOTE — TELEPHONE ENCOUNTER
Please inform pharmacy that this medication has been discontinued.     Thanks!    Francisco Amaya MD PGY3  [facility]

## 2018-10-08 ENCOUNTER — MEDICAL CORRESPONDENCE (OUTPATIENT)
Dept: HEALTH INFORMATION MANAGEMENT | Facility: CLINIC | Age: 78
End: 2018-10-08

## 2018-10-12 DIAGNOSIS — E11.9 TYPE 2 DIABETES MELLITUS WITHOUT COMPLICATION, WITHOUT LONG-TERM CURRENT USE OF INSULIN (H): ICD-10-CM

## 2018-10-17 DIAGNOSIS — M1A.9XX0 CHRONIC GOUT WITHOUT TOPHUS, UNSPECIFIED CAUSE, UNSPECIFIED SITE: ICD-10-CM

## 2018-10-18 RX ORDER — OMEGA-3 FATTY ACIDS/FISH OIL 300-1000MG
200 CAPSULE ORAL EVERY 6 HOURS PRN
Qty: 120 CAPSULE | Refills: 0 | Status: SHIPPED | OUTPATIENT
Start: 2018-10-18 | End: 2019-05-16

## 2018-11-09 DIAGNOSIS — N40.0 BENIGN PROSTATIC HYPERPLASIA, UNSPECIFIED WHETHER LOWER URINARY TRACT SYMPTOMS PRESENT: ICD-10-CM

## 2018-11-12 RX ORDER — TAMSULOSIN HYDROCHLORIDE 0.4 MG/1
0.4 CAPSULE ORAL DAILY
Qty: 30 CAPSULE | Refills: 1 | Status: SHIPPED | OUTPATIENT
Start: 2018-11-12 | End: 2018-11-19

## 2018-11-19 DIAGNOSIS — N40.0 BENIGN PROSTATIC HYPERPLASIA, UNSPECIFIED WHETHER LOWER URINARY TRACT SYMPTOMS PRESENT: ICD-10-CM

## 2018-11-20 RX ORDER — TAMSULOSIN HYDROCHLORIDE 0.4 MG/1
0.4 CAPSULE ORAL DAILY
Qty: 90 CAPSULE | Refills: 3 | Status: SHIPPED | OUTPATIENT
Start: 2018-11-20 | End: 2019-10-23

## 2018-12-03 DIAGNOSIS — Z00.00 ROUTINE GENERAL MEDICAL EXAMINATION AT A HEALTH CARE FACILITY: ICD-10-CM

## 2018-12-05 ENCOUNTER — MEDICAL CORRESPONDENCE (OUTPATIENT)
Dept: HEALTH INFORMATION MANAGEMENT | Facility: CLINIC | Age: 78
End: 2018-12-05

## 2018-12-10 ENCOUNTER — MEDICAL CORRESPONDENCE (OUTPATIENT)
Dept: HEALTH INFORMATION MANAGEMENT | Facility: CLINIC | Age: 78
End: 2018-12-10

## 2018-12-17 DIAGNOSIS — G62.9 NEUROPATHY: ICD-10-CM

## 2018-12-17 RX ORDER — GABAPENTIN 100 MG/1
100 CAPSULE ORAL 3 TIMES DAILY
Qty: 90 CAPSULE | Refills: 3 | Status: SHIPPED | OUTPATIENT
Start: 2018-12-17 | End: 2019-04-17

## 2019-01-23 ENCOUNTER — MEDICAL CORRESPONDENCE (OUTPATIENT)
Dept: HEALTH INFORMATION MANAGEMENT | Facility: CLINIC | Age: 79
End: 2019-01-23

## 2019-02-07 ENCOUNTER — MEDICAL CORRESPONDENCE (OUTPATIENT)
Dept: HEALTH INFORMATION MANAGEMENT | Facility: CLINIC | Age: 79
End: 2019-02-07

## 2019-02-14 DIAGNOSIS — E11.9 TYPE 2 DIABETES MELLITUS WITHOUT COMPLICATION, WITHOUT LONG-TERM CURRENT USE OF INSULIN (H): ICD-10-CM

## 2019-02-22 DIAGNOSIS — I10 ESSENTIAL HYPERTENSION WITH GOAL BLOOD PRESSURE LESS THAN 140/90: ICD-10-CM

## 2019-02-22 DIAGNOSIS — E78.5 HYPERLIPIDEMIA LDL GOAL <100: ICD-10-CM

## 2019-02-22 DIAGNOSIS — E03.9 HYPOTHYROIDISM, UNSPECIFIED TYPE: ICD-10-CM

## 2019-02-22 DIAGNOSIS — K21.9 GASTROESOPHAGEAL REFLUX DISEASE, ESOPHAGITIS PRESENCE NOT SPECIFIED: ICD-10-CM

## 2019-02-22 DIAGNOSIS — M1A.9XX0 CHRONIC GOUT WITHOUT TOPHUS, UNSPECIFIED CAUSE, UNSPECIFIED SITE: ICD-10-CM

## 2019-02-22 DIAGNOSIS — E11.9 TYPE 2 DIABETES MELLITUS WITHOUT COMPLICATION, WITHOUT LONG-TERM CURRENT USE OF INSULIN (H): ICD-10-CM

## 2019-02-22 RX ORDER — ALLOPURINOL 100 MG/1
100 TABLET ORAL DAILY
Qty: 30 TABLET | Refills: 11 | Status: SHIPPED | OUTPATIENT
Start: 2019-02-22 | End: 2020-01-20

## 2019-02-22 RX ORDER — LEVOTHYROXINE SODIUM 75 UG/1
75 TABLET ORAL DAILY
Qty: 90 TABLET | Refills: 3 | Status: SHIPPED | OUTPATIENT
Start: 2019-02-22 | End: 2020-01-20

## 2019-02-22 RX ORDER — LISINOPRIL 20 MG/1
20 TABLET ORAL DAILY
Qty: 90 TABLET | Refills: 3 | Status: SHIPPED | OUTPATIENT
Start: 2019-02-22 | End: 2020-01-20

## 2019-02-22 RX ORDER — ATORVASTATIN CALCIUM 40 MG/1
40 TABLET, FILM COATED ORAL DAILY
Qty: 90 TABLET | Refills: 1 | Status: SHIPPED | OUTPATIENT
Start: 2019-02-22 | End: 2019-08-21

## 2019-03-18 ENCOUNTER — OFFICE VISIT (OUTPATIENT)
Dept: FAMILY MEDICINE | Facility: CLINIC | Age: 79
End: 2019-03-18
Payer: COMMERCIAL

## 2019-03-18 VITALS
RESPIRATION RATE: 24 BRPM | WEIGHT: 146.8 LBS | DIASTOLIC BLOOD PRESSURE: 62 MMHG | TEMPERATURE: 98.4 F | BODY MASS INDEX: 27.65 KG/M2 | HEART RATE: 85 BPM | SYSTOLIC BLOOD PRESSURE: 117 MMHG | OXYGEN SATURATION: 95 %

## 2019-03-18 DIAGNOSIS — Z00.00 HEALTHCARE MAINTENANCE: Primary | ICD-10-CM

## 2019-03-18 DIAGNOSIS — M54.5 RIGHT LOW BACK PAIN, UNSPECIFIED CHRONICITY, WITH SCIATICA PRESENCE UNSPECIFIED: ICD-10-CM

## 2019-03-18 RX ORDER — SITAGLIPTIN 50 MG/1
TABLET, FILM COATED ORAL 2 TIMES DAILY
Refills: 2 | COMMUNITY
Start: 2018-06-27 | End: 2020-11-30

## 2019-03-18 RX ORDER — LIDOCAINE 50 MG/G
OINTMENT TOPICAL 3 TIMES DAILY PRN
Qty: 240 G | Refills: 1 | Status: SHIPPED | OUTPATIENT
Start: 2019-03-18 | End: 2019-12-05

## 2019-03-18 NOTE — PROGRESS NOTES
Preceptor Attestation:   Patient seen, evaluated and discussed with the resident. I have verified the content of the note, which accurately reflects my assessment of the patient and the plan of care.   Supervising Physician:  Hal Damico MD

## 2019-03-18 NOTE — PATIENT INSTRUCTIONS
I will send a referral for [facility] for your low back pain. You saw Dr. Dano Peters in August of 2018 who performed the spinal injection. You should received a call from them to schedule this, if not here is the number. [phone]    It was nice to meet you,     Regis Sarmiento, PGY2  [facility]      March 18, 2019    ORTHOPEDICS ADULT REFERRAL  [facility]  Phone: [phone]  Fax: [phone]    [place] [address]    Appointment:  April 9th, 2019  Arrival Time:  12:00pm  Provider:  Dr.Clary Hightower  left patient a message to please return call with appointment information.  Will mail patient letter.  Nancy MCBRIDE   Please bring in a copy of your insurance card and photo ID.    If you cannot make this appointment, please contact [facility] at [phone] to reschedule.    March 19, 2019 letter sent to patient. hemal

## 2019-03-18 NOTE — LETTER
March 19, 2019      Rafy Riggins  [address]  [place] [address]        Dear Rafy,    Here is the information regarding your upcoming appointment    [facility]  Phone: [phone]  Fax: [phone]    [address] [address]    Appointment:  April 9th, 2019  Arrival Time:  12:00pm  Provider:      Please bring in a copy of your insurance card and photo ID.    If you cannot make this appointment, please contact [facility] at [phone] to reschedule.      Thank you,      Ashlie Armas CMA, Referral Coordinator  [facility]  [phone]

## 2019-03-18 NOTE — PROGRESS NOTES
SUBJECTIVE       Rafy Riggins is a 79 year old  male with a PMHx significant for   Patient Active Problem List   Diagnosis     HTN (hypertension)     Back pain     Poor vision     S/P lumbar spinal fusion     Colon polyp     Diabetes mellitus, type 2 (H)     Obesity     Lumbosacral radiculopathy at S1     Gout of foot     Hypothyroidism     Who presents today with back pain.     Rafy has had right sided low back pain for several years now. No injuries. He had spine surgery 3 years ago in addition to an injection several months ago. The pain improved following the surgery for a while but then returned. The injection helped for about 3 weeks but then the pain returned. He has some radiating down his right leg. The right leg has felt weak for years. No urinary issues. No problems having a BM. No fevers or chills.     He also requests to switch myself to be his PCP for continuity.     His son provided interpretation for the visit.     ROS: As stated in HPI.    Current medications include:   Current Outpatient Medications   Medication Sig Dispense Refill     allopurinol (ZYLOPRIM) 100 MG tablet Take 1 tablet (100 mg) by mouth daily 30 tablet 11     aspirin (ASA) 81 MG tablet Take 1 tablet (81 mg) by mouth daily 90 tablet 3     atorvastatin (LIPITOR) 40 MG tablet Take 1 tablet (40 mg) by mouth daily 90 tablet 1     Blood Pressure Monitoring (ADULT BLOOD PRESSURE CUFF LG) KIT 1 Device daily 1 kit 0     Elastic Bandages & Supports (ANKLE STIRRUP BRACE/LEFT) MISC Use as needed       gabapentin (NEURONTIN) 100 MG capsule Take 1 capsule (100 mg) by mouth 3 times daily 90 capsule 3     ibuprofen 200 MG capsule Take 200 mg by mouth every 6 hours as needed for pain 120 capsule 0     JANUVIA 50 MG tablet 2 times daily  2     levothyroxine (SYNTHROID/LEVOTHROID) 75 MCG tablet Take 1 tablet (75 mcg) by mouth daily 90 tablet 3     lidocaine (XYLOCAINE) 5 % external ointment Apply topically 3 times daily as needed for  moderate pain 240 g 1     lisinopril (PRINIVIL/ZESTRIL) 20 MG tablet Take 1 tablet (20 mg) by mouth daily 90 tablet 3     metFORMIN (GLUCOPHAGE) 500 MG tablet Take 2 tabs (1,000mg) by mouth in the morning with breakfast, and 1 tab (500mg) by mouth in the evening with dinner. 90 tablet 0     multivitamin, therapeutic with minerals (MULTI-VITAMIN) TABS tablet Take 1 tablet by mouth daily 100 tablet 3     ranitidine (ZANTAC) 150 MG tablet Take 1 tablet (150 mg) by mouth 2 times daily as needed for heartburn 120 tablet 3     tamsulosin (FLOMAX) 0.4 MG capsule Take 1 capsule (0.4 mg) by mouth daily 90 capsule 3       PMH, Medications and Allergies were reviewed and updated as needed.        OBJECTIVE     Vitals:    03/18/19 1041   BP: 117/62   Pulse: 85   Resp: 24   Temp: 98.4  F (36.9  C)   TempSrc: Oral   SpO2: 95%   Weight: 66.6 kg (146 lb 12.8 oz)     Body mass index is 27.65 kg/m .    Gen:  Sitting in exam chair, pleasant, NAD  HEENT: Normocephalic, atraumatic.   CV:  RRR.   Pulm:  CTAB, no wheezes, rales, or rhonchi  ABD: Bowel sounds present. Abdomen soft and nontender throughout, no masses or rebound  Extrem: RLE strength 4/5, LLE strength 5/5. Reflexes normal.   Back: Midline well healed surgical scar over the lumbar area. TTP over the right lumbar area.   Neuro: Alert, oriented to person, place, and time  Psych: Mood and affect appropriate    No results found for this or any previous visit (from the past 24 hour(s)).    ASSESSMENT AND PLAN     1. Right low back pain, unspecified chronicity, with sciatica presence unspecified  Prithi presents with chronic right sided low back pain with symptoms radiating into his right leg. These symptoms have been persistent since prior to spinal surgery 3 years ago. No red flag symptoms. He was seen by ortho in August of 2018, lumbar MRI showed now significant change from 2014. He received an epidural steroid injection with some relief. Given this will refer him again to ortho  as they are familiar with him. He takes tylenol and ibuprofen as needed for pain as well. We discussed physical therapy, however, he states he does yoga for 1.5 hours daily and does not need the referral at this time but may be open to it in the future.    - lidocaine (XYLOCAINE) 5 % external ointment; Apply topically 3 times daily as needed for moderate pain  Dispense: 240 g; Refill: 1  - ORTHOPEDICS ADULT REFERRAL; Future      2. Healthcare maintenance  Also due for 65+ wellness visit. Will order future labs for this appointment. Switched myself to be his PCP as he desires continuity.   - Hemoglobin A1c ([facility]); Future  - Microalbumin Creatinine Ratio Random Ur ([facility]); Future  - Lipid Panel ([facility]); Future  - TSH  Sensitive ([facility]); Future  - Comprehensive Metabolic Panel ([facility]); Future      RTC for 65+ annual wellness visit or sooner if develops new or worsening symptoms. Return precautions discussed.     Discussed with MD Regis Oliver, PGY-2  [facility]

## 2019-04-01 ENCOUNTER — MEDICAL CORRESPONDENCE (OUTPATIENT)
Dept: HEALTH INFORMATION MANAGEMENT | Facility: CLINIC | Age: 79
End: 2019-04-01

## 2019-04-08 ENCOUNTER — OFFICE VISIT (OUTPATIENT)
Dept: FAMILY MEDICINE | Facility: CLINIC | Age: 79
End: 2019-04-08
Payer: COMMERCIAL

## 2019-04-08 VITALS
WEIGHT: 148.4 LBS | BODY MASS INDEX: 28.02 KG/M2 | HEART RATE: 82 BPM | HEIGHT: 61 IN | SYSTOLIC BLOOD PRESSURE: 152 MMHG | TEMPERATURE: 98.2 F | DIASTOLIC BLOOD PRESSURE: 79 MMHG | RESPIRATION RATE: 20 BRPM | OXYGEN SATURATION: 95 %

## 2019-04-08 DIAGNOSIS — Z00.00 HEALTHCARE MAINTENANCE: ICD-10-CM

## 2019-04-08 LAB
ALBUMIN SERPL-MCNC: 4.7 MG/DL (ref 3.3–4.9)
ALP SERPL-CCNC: 53.3 U/L (ref 40–150)
ALT SERPL-CCNC: 22.9 U/L (ref 0–45)
AST SERPL-CCNC: 29.1 U/L (ref 0–55)
BILIRUB SERPL-MCNC: 0.8 MG/DL (ref 0.2–1.3)
BUN SERPL-MCNC: 14.8 MG/DL (ref 7–21)
CALCIUM SERPL-MCNC: 9.6 MG/DL (ref 8.5–10.1)
CHLORIDE SERPLBLD-SCNC: 104.4 MMOL/L (ref 98–110)
CHOLEST SERPL-MCNC: 107.6 MG/DL (ref 0–200)
CHOLEST/HDLC SERPL: 2.5 {RATIO} (ref 0–5)
CO2 SERPL-SCNC: 23.2 MMOL/L (ref 20–32)
CREAT SERPL-MCNC: 1.1 MG/DL (ref 0.7–1.3)
CREAT UR-MCNC: 124.1 MG/DL
GFR SERPL CREATININE-BSD FRML MDRD: 68.5 ML/MIN/1.7 M2
GLUCOSE SERPL-MCNC: 92.5 MG'DL (ref 70–99)
HBA1C MFR BLD: 6.1 % (ref 4.1–5.7)
HDLC SERPL-MCNC: 43.7 MG/DL
LDLC SERPL CALC-MCNC: 50 MG/DL (ref 0–129)
MICROALBUMIN UR-MCNC: 1.15 MG/DL (ref 0–1.99)
MICROALBUMIN/CREAT UR: 9.3 MG/G
POTASSIUM SERPL-SCNC: 4.6 MMOL/DL (ref 3.2–4.6)
PROT SERPL-MCNC: 8.1 G/DL (ref 6.8–8.8)
SODIUM SERPL-SCNC: 137.9 MMOL/L (ref 132–142)
TRIGL SERPL-MCNC: 71.5 MG/DL (ref 0–150)
TSH SERPL DL<=0.05 MIU/L-ACNC: 0.54 UIU/ML (ref 0.3–5)
VLDL CHOLESTEROL: 14.3 MG/DL (ref 7–32)

## 2019-04-08 ASSESSMENT — MIFFLIN-ST. JEOR: SCORE: 1246.52

## 2019-04-08 ASSESSMENT — ANXIETY QUESTIONNAIRES
5. BEING SO RESTLESS THAT IT IS HARD TO SIT STILL: NOT AT ALL
1. FEELING NERVOUS, ANXIOUS, OR ON EDGE: SEVERAL DAYS
IF YOU CHECKED OFF ANY PROBLEMS ON THIS QUESTIONNAIRE, HOW DIFFICULT HAVE THESE PROBLEMS MADE IT FOR YOU TO DO YOUR WORK, TAKE CARE OF THINGS AT HOME, OR GET ALONG WITH OTHER PEOPLE: SOMEWHAT DIFFICULT
6. BECOMING EASILY ANNOYED OR IRRITABLE: NOT AT ALL
3. WORRYING TOO MUCH ABOUT DIFFERENT THINGS: NOT AT ALL
2. NOT BEING ABLE TO STOP OR CONTROL WORRYING: SEVERAL DAYS

## 2019-04-08 ASSESSMENT — PATIENT HEALTH QUESTIONNAIRE - PHQ9
5. POOR APPETITE OR OVEREATING: SEVERAL DAYS
SUM OF ALL RESPONSES TO PHQ QUESTIONS 1-9: 7

## 2019-04-08 NOTE — PATIENT INSTRUCTIONS
MEDICARE PERSONAL PREVENTIVE SERVICES PLAN - IMMUNIZATIONS     Here are your recommended immunizations.  Take this home for your reference.                                                    IMMUNIZATIONS Description Recommend today?     Influenza (Flu shot) Prevents flu; should get every year Season for flu shot is over   PCV 13 Pneumonia vaccination; you get it once No; is up to date.   PPSV 23 Second pneumonia vaccination; usually get it 1 year after PCV 13 No; is up to date.   Zoster (Shingles) Prevents shingles; you get it once  (Check with Part D insurance for coverage, must receive at a pharmacy, not clinic) Recommeded and patient recomended to receive from pharmacy.    Tetanus Prevents tetanus; once every 10 years No; is up to date.     MEDICARE PERSONAL PREVENTIVE SERVICES PLAN - SERVICES     MEDICARE PERSONAL PREVENTIVE SERVICES PLAN - SERVICES     Review these tests with your doctor then decide which ones you want and take this page home for your reference                                                    IMMUNIZATIONS Description Recommend today?     Hepatitis B  If you have any of the following risk factors you should be immunized for hepatitis B: severe kidney disease, people who live in the same house as a carrier of Hepatitis B virus, people who live in  institutions (e.g. nursing homes or group homes), homosexual men, patients with hemophilia who received Factor VIII or IX concentrates, abusers of illicit injectable drugs No: is not indicated today.     SCREENING TESTS     Description   Year of Last Screening   Recommended Today?   Heart disease screening blood tests    Cholesterol level Reducing cholesterol can reduce your risk of heart attacks by 25%.  Screening is recommended yearly if you are at risk of heart disease otherwise every 4-5 years 2017 Yes; Recommended and ordered.   Diabetes screening tests    Hemoglobin A1c blood test   Finding and treating diabetes early can reduce complications.   Screening recommended/covered yearly if you have high blood pressure, high cholesterol, obesity (BMI >30), or a history of high blood glucose tests; or 2 of the following: family history of diabetes, overweight (BMI >25 but <30), age 65 years or older, and a history of diabetes of pregnancy or gave birth to baby weighing more than 9 lbs. 2018 Yes; Recommended and ordered.   Hepatitis B screening Finding hepatitis B early can reduce complications.  Screening is recommended for persons with selected risk factors. unk No: is not indicated today.   Hepatitis C screening Finding hepatitis C early can reduce complications.  Screening is recommended for all persons born from 1945 through 1965 and for those with selected other risk factors.  unk No: is not indicated today.   HIV screening Finding HIV early can reduce complications.  Screening is recommended for persons with risk factors for HIV infection. unk No; is up to date.   Glaucoma screening Early detection of glaucoma can prevent blindness.   Please talk to your eye doctor about this.       SCREENING TESTS     Description   Year of Last Screening   Recommended Today?   Colorectal cancer screening    Fecal occult blood test     Screening colonoscopy Screening for colon cancer has been shown to reduce death from colon cancer by 25-30%. Screening recommended to start at 50 years and continuing until age 75 years.   2015 No; is up to date.   Breast Cancer Screening (women)    Mammogram Mammogram screening for breast cancer has been shown to reduce the risk of dying from breast cancer and prolong life. Screening is recommended every 1-2 years for women aged 50 to 74 years.  N/a    Cervical Cancer screening (women)    Pap Cervical pap smears can reduce cervical cancer. Screening is recommended annually if high risk (history of abnormal pap smears) otherwise every 2-3 years, stop screening at 65 years of age if history of normal paps. N/a    Screening for  Osteoporosis:  Bone mass measurements (women)    Dexa Scan Screening and treating Osteoporosis can reduce the risk of hip and spine fractures. Screening is recommended in women 65 years or older and in women and men at risk of osteoporosis. N/a    Screening for Lung Cancer     Low-dose CT scanning Screening can reduce mortality in persons aged 55-80 who have smoked at least 30 pack-years and who are either still smoking or have quit in the past 15 years. None No: is not indicated today.   Abdominal Aortic Aneurysm (AAA) screening    Ultrasound (US)   An aneurysm treated before rupture is very safe -a ruptured aneurysm can be fatal.  Screening  by US for AAA is limited to patients who meet one of the following criteria:    Men who are 65-75 years old and have smoked more than 100 cigarettes in their lifetime    Anyone with a family history of abdominal aortic aneurysm N/a No: is not indicated today.             MEDICARE WELLNESS EXAM PATIENT INSTRUCTIONS    Yearly exam:     See your health care provider every year in order to review changes in your health, review medicines that you take, and discuss preventive care needs such as immunizations and cancer screening.    Get a flu shot each year.     Advance Directives:    If you have not done so, you are encouraged to complete advance directives and/or a living will.   More information about advance directives can be found at: http://www.[place]med.org/advocacy/Key-Issues/Advance-Directives    Nutrition:     Eat at least 5 servings of fruits and vegetables each day.     Eat whole-grain bread, whole-wheat pasta and brown rice instead of white grains and rice.     Talk to your doctor about Calcium and Vitamin D.     Lifestyle:    Exercise for at least 150 minutes a week (30 minutes a day, 5 days a week). This will help you control your weight and prevent disease.     Limit alcohol to one drink per day.     If you smoke, try to quit - your doctor will be happy to help.     Wear  sunscreen to prevent skin cancer.     See your dentist every six months for an exam and cleaning.     See your eye doctor every 1 to 2 years to screen for conditions such as glaucoma, macular degeneration and cataracts.

## 2019-04-08 NOTE — LETTER
April 18, 2019      Rafy Echeverriajoaquinjoo Riggins  [address]  [facility] [address]        Dear Rafy,    You are receiving this letter in regards of your lab results because we were unable to get a hold of you over the phone. Please call the clinic to update your phone number. Your liver and kidney tests are normal. Your cholesterol levels are normal. His thyroid test is normal. His diabetes level is improved from last time. Recommend follow up in 3 months for DM check as discussed.     Please see below for your test results.    Resulted Orders   Comprehensive Metabolic Panel ([facility])   Result Value Ref Range    Albumin 4.7 3.3 - 4.9 mg/dL    Alkaline Phosphatase 53.3 40.0 - 150.0 U/L    ALT 22.9 0.0 - 45.0 U/L    AST 29.1 0.0 - 55.0 U/L    Bilirubin Total 0.8 0.2 - 1.3 mg/dL    Urea Nitrogen 14.8 7.0 - 21.0 mg/dL    Calcium 9.6 8.5 - 10.1 mg/dL    Chloride 104.4 98.0 - 110.0 mmol/L    Carbon Dioxide 23.2 20.0 - 32.0 mmol/L    Creatinine 1.1 0.7 - 1.3 mg/dL    Glucose 92.5 70.0 - 99.0 mg'dL    Potassium 4.6 3.2 - 4.6 mmol/dL    Sodium 137.9 132.0 - 142.0 mmol/L    Protein Total 8.1 6.8 - 8.8 g/dL    GFR Estimate 68.5 >60.0 mL/min/1.7 m2    GFR Estimate If Black 82.8 >60.0 mL/min/1.7 m2   TSH  Sensitive ([facility])   Result Value Ref Range    TSH 0.54 0.30 - 5.00 uIU/mL    Narrative    Test performed by:  [facility]  [address] [place] [address]   Lipid Panel ([facility])   Result Value Ref Range    Cholesterol 107.6 0.0 - 200.0 mg/dL    Cholesterol/HDL Ratio 2.5 0.0 - 5.0    HDL Cholesterol 43.7 >40.0 mg/dL    LDL Cholesterol Calculated 50 0 - 129 mg/dL    Triglycerides 71.5 0.0 - 150.0 mg/dL    VLDL Cholesterol 14.3 7.0 - 32.0 mg/dL   Microalbumin Creatinine Ratio Random Ur ([facility])   Result Value Ref Range    Microalbumin, Urine 1.15 0.00 - 1.99 mg/dL    Creatinine, Urine 124.1 mg/dL    Albumin Urine mg/g Cr 9.3 <=19.9 mg/g    Narrative    Test performed by:  [facility]  [address] [place] [address]  Microalbumin, Random Urine  <2.0 mg/dL . . . . . . . . Normal  3.0-30.0 mg/dL . . . . . . Microalbuminuria  >30.0 mg/dL . . . . . .  . Clinical Proteinuria  Microalbumin/Creatinine Ratio, Random Urine  <20 mg/g . . . . .. . . . Normal   mg/g . . . . . . . Microalbuminuria  >300 mg/g . . . . . . . . Clinical Proteinuria   Hemoglobin A1c ([facility])   Result Value Ref Range    Hemoglobin A1C 6.1 (H) 4.1 - 5.7 %       If you have any questions, please call the clinic to make an appointment.    Sincerely,    Regis Sarmiento MD

## 2019-04-08 NOTE — PROGRESS NOTES
65+ Annual Wellness Visit         HPI     This 79 year old male presents as an established patient; PCP: Regis Sarmiento, who presents for a Subsequent Medicare Wellness Visit    Other issues patient wants to be addressed today:    Chief Complaint   Patient presents with     Wellness Visit     65+ wellness visit       Patient Active Problem List   Diagnosis     HTN (hypertension)     Back pain     Poor vision     S/P lumbar spinal fusion     Colon polyp     Diabetes mellitus, type 2 (H)     Obesity     Lumbosacral radiculopathy at S1     Gout of foot     Hypothyroidism       Past Medical History:   Diagnosis Date     Diabetes mellitus, type 2 (H) 8/3/2015     Gout of foot 3/2/2017     Hypertension      Hypothyroidism 3/2/2017        Family History   Problem Relation Age of Onset     Cancer Brother      Diabetes No family hx of      Coronary Artery Disease No family hx of      Hypertension No family hx of      Hyperlipidemia No family hx of      Cerebrovascular Disease No family hx of      Breast Cancer No family hx of      Colon Cancer No family hx of      Prostate Cancer No family hx of      Other Cancer No family hx of      Depression No family hx of      Anxiety Disorder No family hx of      Mental Illness No family hx of      Substance Abuse No family hx of      Anesthesia Reaction No family hx of      Asthma No family hx of      Osteoporosis No family hx of      Genetic Disorder No family hx of      Thyroid Disease No family hx of      Obesity No family hx of      Unknown/Adopted No family hx of          Problem List, Family History and past Medical History reviewed and unchanged/updated.    Nursing Notes:   Nikolai Rogers CMA  4/8/2019 11:41 AM  Signed  Medicare Wellness Visit  Health Risk Assessment        Visual Acuity:  Right Eye: 10/50   Left Eye: 10/50  Both Eyes: 10/25        FALL RISK ASSESSMENT 4/8/2019 4/5/2018 3/15/2018 8/2/2017   Fallen 2 or more times in the past year? No No No No   Any fall with  injury in the past year? No No No No            Health Risk Assessment / Review of Systems     Constitutional: Any fevers or night sweats? No     Eyes:  Vision problems    YES pt w3ears jermainepablo and seen optometrist 6 months ago    Hearing Do you feel you have hearing loss?  No     Cardiovascular: Any chest pain, fast or irregular heart beat, calf pain with walking?     No           Respiratory:   Any breathing problems or cough?   No     Gastrointestinal: Any stomach or stool problems?   No      Genitourinary: Do you have difficulty controlling urination?   No     Muscles and Joints: Any joint stiffness or soreness?   No     Skin: Any concerning lesions or moles?   No     Nervous System: Any loss of strength or feeling, numbness or tingling, shaking, dizziness, or headache?  No     Mental Health: Any depression, anxiety or problems sleeping?    No     Cognition: Do you have any problems with your memory?  No     PHQ-2 Score:   PHQ-2 ( 1999 Pfizer) 4/8/2019 3/18/2019   Q1: Little interest or pleasure in doing things 0 0   Q2: Feeling down, depressed or hopeless 0 0   PHQ-2 Score 0 0       PHQ-9 Score:   [facility] Follow-up to PHQ 8/12/2016 11/27/2017 12/21/2017   PHQ-9 9. Suicide Ideation past 2 weeks Several days Not at all Not at all            Medical Care     What other specialists or organizations are involved in your medical care?  no  Patient Care Team       Relationship Specialty Notifications Start End    Regis Sarmiento MD PCP - General Student in organized health care education/training program  3/18/19     Phone: [phone] Fax: [phone]         [address] [place] [address]    Soraya Hudson    12/8/15     [facility]     Phone: [phone]         Swapnil Inman PA-C  Orthopaedic Surgery  8/13/18     Back pain, epidural spinal injections    Phone: [phone] Fax: [phone]         [facility] [address] [place] [address]                 Social History / Home Safety  "    Social History     Tobacco Use     Smoking status: Never Smoker     Smokeless tobacco: Never Used   Substance Use Topics     Alcohol use: No     Marital Status:  Who lives in your household? Wife, son, daughter in law, 2 grand shwetas    Does your home have any of the following safety concerns? Loose rugs in the hallway, no grab bars in the bathroom, no handrails on the stairs or have poorly lit areas?  No     Do you feel threatened or controlled by a partner, ex-partner or anyone in your life? No     Has anyone hurt you physically, for example by pushing, hitting, slapping or kicking you   or forcing you to have sex? No          Functional Status     Do you need help with dressing yourself, bathing, or walking? YES walking outside the home but able to walk inside the home    Do you need help with the phone, transportation, shopping, preparing meals, housework, laundry, medications or managing money?No       Risk Behaviors and Healthy Habits     History   Smoking Status     Never Smoker   Smokeless Tobacco     Never Used     How many servings of fruits and vegetables do you eat a day? 3    Exercise: 6-7 days/week for an average of 15-30 minutes yoga streching     Do you frequently drive without a seatbelt? No     Do you use any other drugs? No         Do you use alcohol?No      Frailty Assessment            1. By yourself and note using aids, do you have difficulty walking up 10 steps without resting?   YES 1 (1 for Yes, 0 for No)    2. By yourself and not using mobility aids, do you have any difficulty walking several hundred yards?  YES 1 (1 for Yes, 0 for No)    3. Have you lost 10 or more pounds unintentionally in the previous year? No  (If \"Yes\" and >5% weight loss, then score 1.  Score 0, if <5% weight loss or \"No\" weight loss)    4. How much of the times during the past 3 weeks did you feel tired? 3. Some of the time (\"1\" or \"2\" are scored 1, others 0)    5.  A doctor told the patient they had " "the following illnesses:  High blood pressure  Diabetes  Arthritis (0-4 = score 0, 5-11= score 1)        Nikolai Rogers CMA          Frailty Assessment              1. 1    2. 1    3. 0    4. 0    5. 0    Total score: 2    Frailty screen score (3-5 = frail; consider interdisciplinary assessment and care.  1-2 = prefrail; at risk for adverse health events; 0 = robust)      EVALUATION OF COGNITIVE FUNCTION     Mood/affect:Normal  Appearance:Normal  Family member/caregiver input: Normal    Mini Cog Scoring   3 points   Clock Draw Test result:  Normal     Cognitive screen is:Negative      Other Assessments     CV Risk based on Pooled Cohort Risk (consider assessing every 4-6 years; consider statin in patients with 10-yr risk > 7.5%):    The ASCVD Risk score (Dario DELGADO Jr., et al., 2013) failed to calculate for the following reasons:    The 2013 ASCVD risk score is only valid for ages 40 to 79    Advance Directives: Discussed with patient and family as appropriate.  Has patient completed advance directives and/or a living will?  No, has discussed with family      Immunization History   Administered Date(s) Administered     HepB 07/01/2013     Hepatitis B Immunity: Titer 02/24/2014     Influenza (High Dose) 3 valent vaccine 12/09/2016, 11/27/2017     Influenza Vaccine, 3 YRS +, IM (QUADRIVALENT W/PRESERVATIVES) 11/11/2014     Pneumo Conj 13-V (2010&after) 12/09/2016     Pneumococcal 23 valent 09/18/2014     Poliovirus, inactivated (IPV) 02/27/2014, 04/07/2014, 11/11/2014     TD (ADULT, 7+) 10/20/2011, 11/11/2014     TDAP Vaccine (Boostrix) 02/27/2014     Varicella Immunity: Titer/MD Dx 02/24/2014     Reviewed Immunization Record Today         Physical Exam     Vitals: /79   Pulse 82   Temp 98.2  F (36.8  C) (Oral)   Resp 20   Ht 1.549 m (5' 1\")   Wt 67.3 kg (148 lb 6.4 oz)   SpO2 95%   BMI 28.04 kg/m    BMI= Body mass index is 28.04 kg/m .  EXAM:  Constitutional: healthy, alert and no distress   Cardiovascular: " RRR. No murmurs, clicks gallops or rub. No LE edema. Good peripheral pulses.   Respiratory: Clear to auscultation bilaterally   Psychiatric: mentation appears normal and affect normal/bright  Head: Normocephalic. No masses, lesions, tenderness or abnormalities  Neck: Neck supple. No adenopathy. Thyroid symmetric, normal size,, Carotids without bruits.  ENT: ENT exam normal, no neck nodes or sinus tenderness  Abdomen: Abdomen soft, non-tender. BS normal. No masses, organomegaly  NEURO: Gait normal. Reflexes normal and symmetric. Sensation grossly WNL.  SKIN: no suspicious lesions or rashes  LYMPH: Normal cervical lymph nodes  JOINT/EXTREMITIES: extremities normal- no gross deformities noted, gait normal and normal muscle tone        Assessment and Plan       Reviewed Preventive Services and Plan form with patient as specified in Patient Instructions.    Positive findings on assessment:  None  Rafy was seen today for wellness visit.    Diagnoses and all orders for this visit:    Healthcare maintenance  -     Comprehensive Metabolic Panel ([facility])  -     TSH  Sensitive ([facility])  -     Lipid Panel ([facility])  -     Microalbumin Creatinine Ratio Random Ur ([facility])  -     Hemoglobin A1c ([facility])         Options for treatment and follow-up care were reviewed with the Rafy Cordoni and/or guardian engaged in the decision making process and verbalized understanding of the options discussed and agreed with the final plan.    I precepted today with Lou Lange MD.     Regis Sarmiento, PGY2  [facility]

## 2019-04-08 NOTE — PROGRESS NOTES
"Preceptor attestation:  Vital signs reviewed: /79   Pulse 82   Temp 98.2  F (36.8  C) (Oral)   Resp 20   Ht 1.549 m (5' 1\")   Wt 67.3 kg (148 lb 6.4 oz)   SpO2 95%   BMI 28.04 kg/m      Patient seen, evaluated, and discussed with the resident.  I have verified the content of the note, which accurately reflects my assessment of the patient and the plan of care.    Supervising physician: Lou Lange MD  [facility]    "

## 2019-04-08 NOTE — NURSING NOTE
Medicare Wellness Visit  Health Risk Assessment        Visual Acuity:  Right Eye: 10/50   Left Eye: 10/50  Both Eyes: 10/25        FALL RISK ASSESSMENT 4/8/2019 4/5/2018 3/15/2018 8/2/2017   Fallen 2 or more times in the past year? No No No No   Any fall with injury in the past year? No No No No            Health Risk Assessment / Review of Systems     Constitutional: Any fevers or night sweats? No     Eyes:  Vision problems    YES pt yasmeen sanchez and seen optometrist 6 months ago    Hearing Do you feel you have hearing loss?  No     Cardiovascular: Any chest pain, fast or irregular heart beat, calf pain with walking?     No           Respiratory:   Any breathing problems or cough?   No     Gastrointestinal: Any stomach or stool problems?   No      Genitourinary: Do you have difficulty controlling urination?   No     Muscles and Joints: Any joint stiffness or soreness?   No     Skin: Any concerning lesions or moles?   No     Nervous System: Any loss of strength or feeling, numbness or tingling, shaking, dizziness, or headache?  No     Mental Health: Any depression, anxiety or problems sleeping?    No     Cognition: Do you have any problems with your memory?  No     PHQ-2 Score:   PHQ-2 ( 1999 Pfizer) 4/8/2019 3/18/2019   Q1: Little interest or pleasure in doing things 0 0   Q2: Feeling down, depressed or hopeless 0 0   PHQ-2 Score 0 0       PHQ-9 Score:   [facility] Follow-up to PHQ 8/12/2016 11/27/2017 12/21/2017   PHQ-9 9. Suicide Ideation past 2 weeks Several days Not at all Not at all            Medical Care     What other specialists or organizations are involved in your medical care?  no  Patient Care Team       Relationship Specialty Notifications Start End    Regis Sarmiento MD PCP - General Student in organized health care education/training program  3/18/19     Phone: [phone] Fax: [phone]         [address] [place] [address]    Soraya Hudson    12/8/15     [facility]     Phone:  [phone]         Swapnil Inman PA-C  Orthopaedic Surgery  8/13/18     Back pain, epidural spinal injections    Phone: [phone] Fax: [phone]         [facility] [address] [place] [address]                 Social History / Home Safety     Social History     Tobacco Use     Smoking status: Never Smoker     Smokeless tobacco: Never Used   Substance Use Topics     Alcohol use: No     Marital Status:  Who lives in your household? Wife, son, daughter in law, 2 grand chinldrens    Does your home have any of the following safety concerns? Loose rugs in the hallway, no grab bars in the bathroom, no handrails on the stairs or have poorly lit areas?  No     Do you feel threatened or controlled by a partner, ex-partner or anyone in your life? No     Has anyone hurt you physically, for example by pushing, hitting, slapping or kicking you   or forcing you to have sex? No          Functional Status     Do you need help with dressing yourself, bathing, or walking? YES walking outside the home but able to walk inside the home    Do you need help with the phone, transportation, shopping, preparing meals, housework, laundry, medications or managing money?No       Risk Behaviors and Healthy Habits     History   Smoking Status     Never Smoker   Smokeless Tobacco     Never Used     How many servings of fruits and vegetables do you eat a day? 3    Exercise: 6-7 days/week for an average of 15-30 minutes yoga streching     Do you frequently drive without a seatbelt? No     Do you use any other drugs? No         Do you use alcohol?No      Frailty Assessment            1. By yourself and note using aids, do you have difficulty walking up 10 steps without resting?   YES 1 (1 for Yes, 0 for No)    2. By yourself and not using mobility aids, do you have any difficulty walking several hundred yards?  YES 1 (1 for Yes, 0 for No)    3. Have you lost 10 or more pounds unintentionally in the previous year? No   "(If \"Yes\" and >5% weight loss, then score 1.  Score 0, if <5% weight loss or \"No\" weight loss)    4. How much of the times during the past 3 weeks did you feel tired? 3. Some of the time (\"1\" or \"2\" are scored 1, others 0)    5.  A doctor told the patient they had the following illnesses:  High blood pressure  Diabetes  Arthritis (0-4 = score 0, 5-11= score 1)        Nikolai Rogers CMA    "

## 2019-04-09 ENCOUNTER — TRANSFERRED RECORDS (OUTPATIENT)
Dept: HEALTH INFORMATION MANAGEMENT | Facility: CLINIC | Age: 79
End: 2019-04-09

## 2019-04-11 DIAGNOSIS — E11.9 TYPE 2 DIABETES MELLITUS WITHOUT COMPLICATION, WITHOUT LONG-TERM CURRENT USE OF INSULIN (H): ICD-10-CM

## 2019-04-16 DIAGNOSIS — G62.9 NEUROPATHY: ICD-10-CM

## 2019-04-17 RX ORDER — GABAPENTIN 100 MG/1
100 CAPSULE ORAL 3 TIMES DAILY
Qty: 90 CAPSULE | Refills: 3 | Status: SHIPPED | OUTPATIENT
Start: 2019-04-17 | End: 2019-08-09

## 2019-04-30 ENCOUNTER — MEDICAL CORRESPONDENCE (OUTPATIENT)
Dept: HEALTH INFORMATION MANAGEMENT | Facility: CLINIC | Age: 79
End: 2019-04-30

## 2019-04-30 ENCOUNTER — TELEPHONE (OUTPATIENT)
Dept: FAMILY MEDICINE | Facility: CLINIC | Age: 79
End: 2019-04-30

## 2019-04-30 NOTE — TELEPHONE ENCOUNTER
Had a call from Alistair at [facility] [phone] following up on a DME order for a transport chair . What is the status of this? /[facility]

## 2019-04-30 NOTE — LETTER
4/30/2019    ATTN: Alistair Valencia    RE: Rafy Campbell Gilson  [address]  [facility] [address]       Patient should have a wheel chair provided, not a transport chair. The documentation is appropriate at this time. If the patient is requesting a transport chair specifically, they will need a repeat visit in clinic.    If you have any questions or concerns, please give us a call or fax.    Thank you.    Francisco Amaya MD PGY3  [facility]

## 2019-05-07 NOTE — TELEPHONE ENCOUNTER
Completed paperwork and placed in red folder.    Francisco Amaya MD PGY3  [facility]

## 2019-05-13 NOTE — TELEPHONE ENCOUNTER
Followed up with Alistair from [facility] and yes they received the paperwork. /[facility]

## 2019-05-14 DIAGNOSIS — M1A.9XX0 CHRONIC GOUT WITHOUT TOPHUS, UNSPECIFIED CAUSE, UNSPECIFIED SITE: ICD-10-CM

## 2019-05-16 RX ORDER — OMEGA-3 FATTY ACIDS/FISH OIL 300-1000MG
200 CAPSULE ORAL EVERY 6 HOURS PRN
Qty: 120 CAPSULE | Refills: 0 | Status: SHIPPED | OUTPATIENT
Start: 2019-05-16 | End: 2019-06-19

## 2019-05-17 ENCOUNTER — TRANSFERRED RECORDS (OUTPATIENT)
Dept: HEALTH INFORMATION MANAGEMENT | Facility: CLINIC | Age: 79
End: 2019-05-17

## 2019-05-17 NOTE — TELEPHONE ENCOUNTER
Received another request from [facility] requesting a DME for transport chair instead of a wheel chair. Will fax back letter stating that patient should have a wheel chair.    Francisco Amaya MD PGY3  [facility]

## 2019-05-20 DIAGNOSIS — E11.9 TYPE 2 DIABETES MELLITUS WITHOUT COMPLICATION, WITHOUT LONG-TERM CURRENT USE OF INSULIN (H): ICD-10-CM

## 2019-05-23 ENCOUNTER — MEDICAL CORRESPONDENCE (OUTPATIENT)
Dept: HEALTH INFORMATION MANAGEMENT | Facility: CLINIC | Age: 79
End: 2019-05-23

## 2019-06-06 DIAGNOSIS — R68.89 FORGETFULNESS: ICD-10-CM

## 2019-06-11 RX ORDER — MULTIPLE VITAMINS W/ MINERALS TAB 9MG-400MCG
1 TAB ORAL DAILY
Qty: 100 TABLET | Refills: 3 | Status: SHIPPED | OUTPATIENT
Start: 2019-06-11 | End: 2020-05-21

## 2019-06-17 DIAGNOSIS — M1A.9XX0 CHRONIC GOUT WITHOUT TOPHUS, UNSPECIFIED CAUSE, UNSPECIFIED SITE: ICD-10-CM

## 2019-06-19 RX ORDER — OMEGA-3 FATTY ACIDS/FISH OIL 300-1000MG
200 CAPSULE ORAL EVERY 6 HOURS PRN
Qty: 120 CAPSULE | Refills: 0 | Status: SHIPPED | OUTPATIENT
Start: 2019-06-19 | End: 2019-07-23

## 2019-06-20 DIAGNOSIS — E11.9 TYPE 2 DIABETES MELLITUS WITHOUT COMPLICATION, WITHOUT LONG-TERM CURRENT USE OF INSULIN (H): ICD-10-CM

## 2019-07-01 ENCOUNTER — MEDICAL CORRESPONDENCE (OUTPATIENT)
Dept: HEALTH INFORMATION MANAGEMENT | Facility: CLINIC | Age: 79
End: 2019-07-01

## 2019-07-22 DIAGNOSIS — M1A.9XX0 CHRONIC GOUT WITHOUT TOPHUS, UNSPECIFIED CAUSE, UNSPECIFIED SITE: ICD-10-CM

## 2019-07-23 RX ORDER — OMEGA-3 FATTY ACIDS/FISH OIL 300-1000MG
200 CAPSULE ORAL EVERY 6 HOURS PRN
Qty: 120 CAPSULE | Refills: 0 | Status: SHIPPED | OUTPATIENT
Start: 2019-07-23 | End: 2019-08-27

## 2019-08-08 DIAGNOSIS — G62.9 NEUROPATHY: ICD-10-CM

## 2019-08-08 NOTE — TELEPHONE ENCOUNTER
Per pharmacy pt is requesting 90 day supply for gabapentin (NEURONTIN) 100 MG capsule. Please advise and change SIG if approved.   CAS[facility] Conti

## 2019-08-09 RX ORDER — GABAPENTIN 100 MG/1
100 CAPSULE ORAL 3 TIMES DAILY
Qty: 90 CAPSULE | Refills: 3 | Status: SHIPPED | OUTPATIENT
Start: 2019-08-09 | End: 2019-11-27

## 2019-08-14 ENCOUNTER — DOCUMENTATION ONLY (OUTPATIENT)
Dept: FAMILY MEDICINE | Facility: CLINIC | Age: 79
End: 2019-08-14

## 2019-08-14 NOTE — PROGRESS NOTES
To be completed in Nursing note:    Please reference list for forms that require a visit for completion.  Please remind patients that providers are given 3-5 business days to complete and return forms.      Form type:[employer] Physician orders    Date form received: 08/14/19    Date form completed by Physician:08/15/19    How was form returned to patient (mailed, faxed, or at  for patient to ): fax to [phone]    Date form mailed/faxed/left at  for patient and sent to HIM for scanning:  Faxed 08/15/19    Once form is left for patient, faxed, or mailed PCS will then close the documentation only encounter.

## 2019-08-14 NOTE — PROGRESS NOTES
To be completed in Nursing note:    Please reference list for forms that require a visit for completion.  Please remind patients that providers are given 3-5 business days to complete and return forms.      Form type:[employer]    Date form received: 08/13/19    Date form completed by Physician:08/15/19    How was form returned to patient (mailed, faxed, or at  for patient to ):faxed to [phone]    Date form mailed/faxed/left at  for patient and sent to HIM for scanning:  Faxed 08/15/19    Once form is left for patient, faxed, or mailed PCS will then close the documentation only encounter.

## 2019-08-15 ENCOUNTER — MEDICAL CORRESPONDENCE (OUTPATIENT)
Dept: HEALTH INFORMATION MANAGEMENT | Facility: CLINIC | Age: 79
End: 2019-08-15

## 2019-08-20 ENCOUNTER — MEDICAL CORRESPONDENCE (OUTPATIENT)
Dept: HEALTH INFORMATION MANAGEMENT | Facility: CLINIC | Age: 79
End: 2019-08-20

## 2019-08-20 ENCOUNTER — DOCUMENTATION ONLY (OUTPATIENT)
Dept: FAMILY MEDICINE | Facility: CLINIC | Age: 79
End: 2019-08-20

## 2019-08-20 DIAGNOSIS — E78.5 HYPERLIPIDEMIA LDL GOAL <100: ICD-10-CM

## 2019-08-20 NOTE — PROGRESS NOTES
To be completed in Nursing note:    Please reference list for forms that require a visit for completion.  Please remind patients that providers are given 3-5 business days to complete and return forms.      Form type:[employer] Plan of Care    Date form received: 08/15/19    Date form completed by Physician:08/20/19    How was form returned to patient (mailed, faxed, or at  for patient to ):faxed to [phone]    Date form mailed/faxed/left at  for patient and sent to HIM for scanning:  Faxed 08/20/19    Once form is left for patient, faxed, or mailed PCS will then close the documentation only encounter.

## 2019-08-21 RX ORDER — ATORVASTATIN CALCIUM 40 MG/1
40 TABLET, FILM COATED ORAL DAILY
Qty: 90 TABLET | Refills: 1 | Status: SHIPPED | OUTPATIENT
Start: 2019-08-21 | End: 2020-01-30

## 2019-08-26 DIAGNOSIS — M1A.9XX0 CHRONIC GOUT WITHOUT TOPHUS, UNSPECIFIED CAUSE, UNSPECIFIED SITE: ICD-10-CM

## 2019-08-27 RX ORDER — OMEGA-3 FATTY ACIDS/FISH OIL 300-1000MG
200 CAPSULE ORAL EVERY 6 HOURS PRN
Qty: 120 CAPSULE | Refills: 0 | Status: SHIPPED | OUTPATIENT
Start: 2019-08-27 | End: 2019-12-05

## 2019-09-11 DIAGNOSIS — E11.9 TYPE 2 DIABETES MELLITUS WITHOUT COMPLICATION, WITHOUT LONG-TERM CURRENT USE OF INSULIN (H): ICD-10-CM

## 2019-09-17 ENCOUNTER — TRANSFERRED RECORDS (OUTPATIENT)
Dept: HEALTH INFORMATION MANAGEMENT | Facility: CLINIC | Age: 79
End: 2019-09-17

## 2019-09-20 ENCOUNTER — TELEPHONE (OUTPATIENT)
Dept: FAMILY MEDICINE | Facility: CLINIC | Age: 79
End: 2019-09-20

## 2019-09-20 NOTE — TELEPHONE ENCOUNTER
Confirmed verbal orders with MARY Ley for PT/OT lillie. She states the family is concerned the pt is becoming weaker and was requesting this. No further concerns or questions. ./LR

## 2019-09-20 NOTE — TELEPHONE ENCOUNTER
[facility] phone call message - order or referral request for patient:     Order or referral being requested: PT AND OT FOR HOME CARE       Additional Comments:     OK to leave a message on voice mail? Yes    Primary language: Sinhala      needed? Yes    Call taken on September 20, 2019 at 8:26 AM by Hal Bhat

## 2019-09-24 ENCOUNTER — MEDICAL CORRESPONDENCE (OUTPATIENT)
Dept: HEALTH INFORMATION MANAGEMENT | Facility: CLINIC | Age: 79
End: 2019-09-24

## 2019-09-25 ENCOUNTER — DOCUMENTATION ONLY (OUTPATIENT)
Dept: FAMILY MEDICINE | Facility: CLINIC | Age: 79
End: 2019-09-25

## 2019-09-25 NOTE — PROGRESS NOTES
Please reference list for forms that require a visit for completion.  Please remind patients that providers are given 3-5 business days to complete and return forms.        Form type:[employer] Plan of Care     Date form received: 09/23/19     Date form completed by Physician:09/24/19     How was form returned to patient (mailed, faxed, or at  for patient to ):faxed to [phone]     Date form mailed/faxed/left at  for patient and sent to HIM for scanning:  Faxed 09/24/19     Once form is left for patient, faxed, or mailed PCS will then close the documentation only encounter.

## 2019-09-25 NOTE — PROGRESS NOTES
To be completed in Nursing note:    Please reference list for forms that require a visit for completion.  Please remind patients that providers are given 3-5 business days to complete and return forms.      Form type:[facility] care [facility], physicians orders for PT & OT Eval and treat    Date form received: 09/24/19    Date form completed by Physician:09/27/19    How was form returned to patient (mailed, faxed, or at  for patient to ): faxed to [phone]    Date form mailed/faxed/left at  for patient and sent to HIM for scanning:faxed on 09/30/19      Once form is left for patient, faxed, or mailed PCS will then close the documentation only encounter.

## 2019-09-26 ENCOUNTER — TELEPHONE (OUTPATIENT)
Dept: FAMILY MEDICINE | Facility: CLINIC | Age: 79
End: 2019-09-26

## 2019-09-26 NOTE — TELEPHONE ENCOUNTER
[facility] phone call message- general phone call:    Reason for call: He is having right side numbness    Action desired: call back.    Return call needed: Yes    OK to leave a message on voice mail? Yes    Advised patient to response may take up to 2 business days: Yes    Primary language: Kinyarwanda      needed? Yes    Call taken on September 26, 2019 at 10:36 AM by Karla Smith

## 2019-09-26 NOTE — TELEPHONE ENCOUNTER
MARY Ley reports patient c/o rt sided numbness, nurse reports no changes in pt's speech,mobility,or LOC. Nurse relays that patient feels the numbness is related to issues he has with his spine, the numbness has been intermittent for approx.one year. Call transferred to call center while nurse and  were still with patient to schedule appointment to be evaluated.     Note routed to Dr. Sabina Clifton RN

## 2019-09-27 ENCOUNTER — MEDICAL CORRESPONDENCE (OUTPATIENT)
Dept: HEALTH INFORMATION MANAGEMENT | Facility: CLINIC | Age: 79
End: 2019-09-27

## 2019-09-27 ENCOUNTER — OFFICE VISIT (OUTPATIENT)
Dept: FAMILY MEDICINE | Facility: CLINIC | Age: 79
End: 2019-09-27
Payer: COMMERCIAL

## 2019-09-27 VITALS
BODY MASS INDEX: 27.94 KG/M2 | HEIGHT: 61 IN | WEIGHT: 148 LBS | SYSTOLIC BLOOD PRESSURE: 154 MMHG | RESPIRATION RATE: 24 BRPM | HEART RATE: 86 BPM | TEMPERATURE: 98.7 F | OXYGEN SATURATION: 98 % | DIASTOLIC BLOOD PRESSURE: 75 MMHG

## 2019-09-27 DIAGNOSIS — M15.9 OSTEOARTHRITIS OF MULTIPLE JOINTS, UNSPECIFIED OSTEOARTHRITIS TYPE: ICD-10-CM

## 2019-09-27 DIAGNOSIS — R68.89 FORGETFULNESS: ICD-10-CM

## 2019-09-27 DIAGNOSIS — M54.5 RIGHT LOW BACK PAIN, UNSPECIFIED CHRONICITY, WITH SCIATICA PRESENCE UNSPECIFIED: Primary | ICD-10-CM

## 2019-09-27 RX ORDER — DULOXETIN HYDROCHLORIDE 30 MG/1
30 CAPSULE, DELAYED RELEASE ORAL DAILY
Qty: 30 CAPSULE | Refills: 1 | Status: SHIPPED | OUTPATIENT
Start: 2019-09-27 | End: 2019-11-21

## 2019-09-27 SDOH — HEALTH STABILITY: MENTAL HEALTH: HOW OFTEN DO YOU HAVE A DRINK CONTAINING ALCOHOL?: NEVER

## 2019-09-27 ASSESSMENT — PAIN SCALES - GENERAL: PAINLEVEL: MODERATE PAIN (4)

## 2019-09-27 ASSESSMENT — ANXIETY QUESTIONNAIRES
5. BEING SO RESTLESS THAT IT IS HARD TO SIT STILL: MORE THAN HALF THE DAYS
2. NOT BEING ABLE TO STOP OR CONTROL WORRYING: NEARLY EVERY DAY
3. WORRYING TOO MUCH ABOUT DIFFERENT THINGS: MORE THAN HALF THE DAYS
GAD7 TOTAL SCORE: 16
1. FEELING NERVOUS, ANXIOUS, OR ON EDGE: MORE THAN HALF THE DAYS
7. FEELING AFRAID AS IF SOMETHING AWFUL MIGHT HAPPEN: MORE THAN HALF THE DAYS
IF YOU CHECKED OFF ANY PROBLEMS ON THIS QUESTIONNAIRE, HOW DIFFICULT HAVE THESE PROBLEMS MADE IT FOR YOU TO DO YOUR WORK, TAKE CARE OF THINGS AT HOME, OR GET ALONG WITH OTHER PEOPLE: VERY DIFFICULT
6. BECOMING EASILY ANNOYED OR IRRITABLE: NEARLY EVERY DAY

## 2019-09-27 ASSESSMENT — MIFFLIN-ST. JEOR: SCORE: 1244.7

## 2019-09-27 ASSESSMENT — PATIENT HEALTH QUESTIONNAIRE - PHQ9
5. POOR APPETITE OR OVEREATING: MORE THAN HALF THE DAYS
SUM OF ALL RESPONSES TO PHQ QUESTIONS 1-9: 21

## 2019-09-27 NOTE — NURSING NOTE
Chief Complaint   Patient presents with     RECHECK     Back pain and Right Sided Numbness and weakness      Robert Hodgson CMA    Due to patient being non-English speaking/uses sign language, an  was used for this visit. Only for face-to-face interpretation by an external agency, date and length of interpretation can be found on the scanned worksheet.     name: Rick Vázquez  Agency: [employer]  Language: Khmer   Telephone number: [phone]  Type of interpretation: Group, spoken; number of participants: 2     Robert Hodgson CMA

## 2019-09-27 NOTE — PATIENT INSTRUCTIONS
ongoing difficulties.  Back pain. Right side numb. RIGHT weakness.  Did exercises years ago, which improved function. Now less active, decreased mood, not sleeping well. Some confusion/irritibilty    1) Generic cymbalta. Take once daily with food    Recheck in 4 weeks    HOME CARE REFERRAL  Referral, demographics faxed to [facility] at [phone] who will contact patient to schedule.    [facility]  Fax: [phone]    ADDENDUM: 10/14/19  NOTIFIED THIS PATIENT WAS ALREADY RECEIVING IN HOME NURSING FROM [facility]. I FAXED THE NEW ORDER FOR PT TO THEM.    [facility]  PHONE:[phone]  FAX: [phone]      Jil Caldwell

## 2019-09-27 NOTE — PROGRESS NOTES
"       SUBJECTIVE       Rafy Riggins is a 79 year old  male with a PMH significant for:     Patient Active Problem List   Diagnosis     HTN (hypertension)     Back pain     Poor vision     S/P lumbar spinal fusion     Colon polyp     Diabetes mellitus, type 2 (H)     Obesity     Lumbosacral radiculopathy at S1     Gout of foot     Hypothyroidism     He presents with ongoing difficulties.  Back pain. Right side numb. RIGHT weakness.  Did exercises years ago in [facility] , which improved function. Now less active, decreased mood, not sleeping well. Some confusion/irritibilty noted by family (son attends today's visit).    PMH, Medications and Allergies were reviewed and updated as needed.        REVIEW OF SYSTEMS     General: No fevers, chills, sweats, unexplained weight loss  Head: No headache  Neck: No swallowing problems   CV: No chest pain or palpitations  Resp: No shortness of breath.  No cough. No hemoptysis.  GI: No constipation, diarrhea, or blood in stool.  no nausea or vomiting  : No pain passing urine or urinary frequency            OBJECTIVE     Vitals:    09/27/19 0840   BP: (!) 154/75   Pulse: 86   Resp: 24   Temp: 98.7  F (37.1  C)   TempSrc: Oral   SpO2: 98%   Weight: 67.1 kg (148 lb)   Height: 1.549 m (5' 1\")     Body mass index is 27.96 kg/m .    Gen: adequately nourished and in NAD  HEENT: PERRLA; TMs normal color and landmarks; nasopharynx pink and moist; oropharynx pink and moist  Neck: supple without lymphadenopathy  CV:  RRR  - no murmurs, rubs, or gallups,   Pulm:  CTAB, no wheezes/rales/rhonchi, good air entry   ABD: soft, nontender, no masses, no rebound, BS intact throughout  Extrem: Right upper > lower weakness (+pronator drift)  Back: diffuse paraspinal muscle tenderness and spasm. SLR limited by generalized pain, but no true radiculopathy    No results found for this or any previous visit (from the past 24 hour(s)).        ASSESSMENT AND PLAN     Rafy was seen today for " recheck.    Diagnoses and all orders for this visit:    Right low back pain, unspecified chronicity, with sciatica presence unspecified  -     DULoxetine (CYMBALTA) 30 MG capsule; Take 1 capsule (30 mg) by mouth daily    Osteoarthritis of multiple joints, unspecified osteoarthritis type  -     DULoxetine (CYMBALTA) 30 MG capsule; Take 1 capsule (30 mg) by mouth daily        Patient Instructions   ongoing difficulties.  Back pain. Right side numb. RIGHT weakness.  Did exercises years ago, which improved function. Now less active, decreased mood, not sleeping well. Some confusion/irritibilty    1) Generic cymbalta. Take once daily with food    Recheck in 4 weeks      Total of 30 minutes was spent in face to face contact with patient with > 50% in counseling and coordination of care.  Options for treatment and/or follow-up care were reviewed with the patient. Rafy Riggins was engaged and actively involved in the decision making process. He verbalized understanding of the options discussed and was satisfied with the final plan.    Kristian Muhammad MD MD

## 2019-09-28 ASSESSMENT — ANXIETY QUESTIONNAIRES: GAD7 TOTAL SCORE: 16

## 2019-10-03 ENCOUNTER — HOME CARE/HOSPICE - HEALTHEAST (OUTPATIENT)
Dept: HOME HEALTH SERVICES | Facility: HOME HEALTH | Age: 79
End: 2019-10-03

## 2019-10-04 ENCOUNTER — TELEPHONE (OUTPATIENT)
Dept: FAMILY MEDICINE | Facility: CLINIC | Age: 79
End: 2019-10-04

## 2019-10-04 NOTE — TELEPHONE ENCOUNTER
Verbal order given to [facility] to delay initiation of SN until 10/10/19 at families request.    Note routed to Dr.Krumme Elodia TOTH

## 2019-10-04 NOTE — TELEPHONE ENCOUNTER
[facility] phone call message- general phone call:    Reason for call: Requesting verbal orders for SN to start patient on 10/10. Current orders states they start ASAP but can it wait until 10/10    Return call needed: Yes    OK to leave a message on voice mail? Yes    Primary language: [facility]      needed? Yes    Call taken on October 4, 2019 at 3:30 PM by Wayne Salas

## 2019-10-10 ENCOUNTER — HOME CARE/HOSPICE - HEALTHEAST (OUTPATIENT)
Dept: HOME HEALTH SERVICES | Facility: HOME HEALTH | Age: 79
End: 2019-10-10

## 2019-10-10 ENCOUNTER — COMMUNICATION - HEALTHEAST (OUTPATIENT)
Dept: HOME HEALTH SERVICES | Facility: HOME HEALTH | Age: 79
End: 2019-10-10

## 2019-10-16 ENCOUNTER — DOCUMENTATION ONLY (OUTPATIENT)
Dept: FAMILY MEDICINE | Facility: CLINIC | Age: 79
End: 2019-10-16

## 2019-10-16 ENCOUNTER — MEDICAL CORRESPONDENCE (OUTPATIENT)
Dept: HEALTH INFORMATION MANAGEMENT | Facility: CLINIC | Age: 79
End: 2019-10-16

## 2019-10-23 DIAGNOSIS — Z00.00 ROUTINE GENERAL MEDICAL EXAMINATION AT A HEALTH CARE FACILITY: ICD-10-CM

## 2019-10-23 DIAGNOSIS — N40.0 BENIGN PROSTATIC HYPERPLASIA, UNSPECIFIED WHETHER LOWER URINARY TRACT SYMPTOMS PRESENT: ICD-10-CM

## 2019-10-23 RX ORDER — TAMSULOSIN HYDROCHLORIDE 0.4 MG/1
0.4 CAPSULE ORAL DAILY
Qty: 90 CAPSULE | Refills: 3 | Status: SHIPPED | OUTPATIENT
Start: 2019-10-23 | End: 2020-08-07

## 2019-10-30 ENCOUNTER — DOCUMENTATION ONLY (OUTPATIENT)
Dept: FAMILY MEDICINE | Facility: CLINIC | Age: 79
End: 2019-10-30

## 2019-10-30 NOTE — PROGRESS NOTES
To be completed in Nursing note:    Please reference list for forms that require a visit for completion.  Please remind patients that providers are given 3-5 business days to complete and return forms.      Form type:[facility] for Eval completed outside of 48 hours and ok for home PT Eval on 10/25/19 with frequency of 1x/wk, 2x/wk for 3 wks    Date form received: 10/30/19    Date form completed by Physician:19    How was form returned to patient (mailed, faxed, or at  for patient to ): fax to [phone]    Date form mailed/faxed/left at  for patient and sent to HIM for scannin19      Once form is left for patient, faxed, or mailed PCS will then close the documentation only encounter.

## 2019-11-01 ENCOUNTER — OFFICE VISIT (OUTPATIENT)
Dept: FAMILY MEDICINE | Facility: CLINIC | Age: 79
End: 2019-11-01
Payer: COMMERCIAL

## 2019-11-01 VITALS
HEART RATE: 62 BPM | WEIGHT: 146.8 LBS | BODY MASS INDEX: 27.74 KG/M2 | TEMPERATURE: 98.3 F | OXYGEN SATURATION: 96 % | RESPIRATION RATE: 16 BRPM | DIASTOLIC BLOOD PRESSURE: 72 MMHG | SYSTOLIC BLOOD PRESSURE: 144 MMHG

## 2019-11-01 DIAGNOSIS — G89.29 CHRONIC RIGHT-SIDED LOW BACK PAIN WITH RIGHT-SIDED SCIATICA: ICD-10-CM

## 2019-11-01 DIAGNOSIS — Z23 NEED FOR PROPHYLACTIC VACCINATION AND INOCULATION AGAINST INFLUENZA: ICD-10-CM

## 2019-11-01 DIAGNOSIS — E11.9 TYPE 2 DIABETES MELLITUS WITHOUT COMPLICATION, WITHOUT LONG-TERM CURRENT USE OF INSULIN (H): Primary | ICD-10-CM

## 2019-11-01 DIAGNOSIS — M54.41 CHRONIC RIGHT-SIDED LOW BACK PAIN WITH RIGHT-SIDED SCIATICA: ICD-10-CM

## 2019-11-01 ASSESSMENT — ANXIETY QUESTIONNAIRES
3. WORRYING TOO MUCH ABOUT DIFFERENT THINGS: MORE THAN HALF THE DAYS
GAD7 TOTAL SCORE: 14
7. FEELING AFRAID AS IF SOMETHING AWFUL MIGHT HAPPEN: MORE THAN HALF THE DAYS
1. FEELING NERVOUS, ANXIOUS, OR ON EDGE: MORE THAN HALF THE DAYS
6. BECOMING EASILY ANNOYED OR IRRITABLE: MORE THAN HALF THE DAYS
2. NOT BEING ABLE TO STOP OR CONTROL WORRYING: MORE THAN HALF THE DAYS
5. BEING SO RESTLESS THAT IT IS HARD TO SIT STILL: MORE THAN HALF THE DAYS
IF YOU CHECKED OFF ANY PROBLEMS ON THIS QUESTIONNAIRE, HOW DIFFICULT HAVE THESE PROBLEMS MADE IT FOR YOU TO DO YOUR WORK, TAKE CARE OF THINGS AT HOME, OR GET ALONG WITH OTHER PEOPLE: VERY DIFFICULT

## 2019-11-01 ASSESSMENT — PATIENT HEALTH QUESTIONNAIRE - PHQ9
SUM OF ALL RESPONSES TO PHQ QUESTIONS 1-9: 16
5. POOR APPETITE OR OVEREATING: MORE THAN HALF THE DAYS

## 2019-11-01 NOTE — PATIENT INSTRUCTIONS
RIGHT low back pain with radiation    1) Flu shot  2) Continue PT/OT in home  3) Check Lumbar MRI  4) Recheck in 1 month  5) Continue cymbalta    19  MRI REFERRAL  [facility]  [phone]  Fax Orders to [phone]     Order faxed to [facility] at [phone], they will contact patient to schedule.     Jil Caldwell

## 2019-11-01 NOTE — PROGRESS NOTES
SUBJECTIVE       Rafy Riggins is a 79 year old  male with a PMH significant for:     Patient Active Problem List   Diagnosis     HTN (hypertension)     Back pain     Poor vision     S/P lumbar spinal fusion     Colon polyp     Diabetes mellitus, type 2 (H)     Obesity     Lumbosacral radiculopathy at S1     Gout of foot     Hypothyroidism     He presents with ongoing difficulties.  Back pain. Right side numb. RIGHT weakness.  Has had home PT on 2 occasions.  Lumber MRI in 2014 showed significant degenerative changes    PMH, Medications and Allergies were reviewed and updated as needed.        REVIEW OF SYSTEMS     General: No fevers, chills, sweats, unexplained weight loss  Head: No headache  Neck: No swallowing problems   CV: No chest pain or palpitations  Resp: No shortness of breath.  No cough. No hemoptysis.  GI: No constipation, diarrhea, or blood in stool.  no nausea or vomiting  : No pain passing urine or urinary frequency            OBJECTIVE     Vitals:    11/01/19 0849 11/01/19 0850   BP: (!) 147/72 (!) 144/72   BP Location: Right arm Right arm   Patient Position: Sitting Sitting   Cuff Size: Adult Regular Adult Regular   Pulse: 62 62   Resp: 16    Temp: 98.3  F (36.8  C)    TempSrc: Oral    SpO2: 96%    Weight: 66.6 kg (146 lb 12.8 oz)      Body mass index is 27.74 kg/m .    Gen: adequately nourished and in NAD  HEENT: PERRLA; TMs normal color and landmarks; nasopharynx pink and moist; oropharynx pink and moist  Neck: supple without lymphadenopathy  CV:  RRR  - no murmurs, rubs, or gallups,   Pulm:  CTAB, no wheezes/rales/rhonchi, good air entry   ABD: soft, nontender, no masses, no rebound, BS intact throughout  Extrem: Right upper > lower weakness (+pronator drift)  Back: diffuse paraspinal muscle tenderness and spasm. SLR limited by generalized pain, but +/- true radiculopathy    No results found for this or any previous visit (from the past 24 hour(s)).        ASSESSMENT AND PLAN      Rafy was seen today for recheck, other, medication reconciliation and imm/inj.    Diagnoses and all orders for this visit:    Type 2 diabetes mellitus without complication, without long-term current use of insulin (H)    Chronic right-sided low back pain with right-sided sciatica  -     MRI LUMBAR SPINE W/O CONTRAST; Future    Need for prophylactic vaccination and inoculation against influenza  -     Fluzone quad, multidose 0.5ml, 6+ months [81230]        Patient Instructions   RIGHT low back pain with radiation    1) Flu shot  2) Continue PT/OT in home  3) Check Lumbar MRI  4) Recheck in 1 month  5) Continue cymbalta      Total of 30 minutes was spent in face to face contact with patient with > 50% in counseling and coordination of care.  Options for treatment and/or follow-up care were reviewed with the patient. Rafy Campbell Gilson was engaged and actively involved in the decision making process. He verbalized understanding of the options discussed and was satisfied with the final plan.    Kristian Muhammad MD MD

## 2019-11-01 NOTE — NURSING NOTE
Primary Care PTSD Screen    In your life, have you ever had any experience that was so frightening, horrible, or upsetting that, in the past month, you...    1.) Have had nightmares about it or thought about it when you did not want to? YES    2.) Tried hard not to think about it or went out of your way to avoid situations that remind you of it? YES    3.) Were constantly on guard, watchful, or easily startled? NO    4.) Felt numb or detached from others, activities, or your surroundings? YES           Have you experienced sustained periods of feeling uncharacteristically energetic?  NO    Have you had periods of not sleeping, but not feeling tired?  YES    Have you felt that your thoughts were racing and couldn't be slowed down?  NO    Have you had periods where you were excessive in sexual interest, spending money, or taking unusual risks?  NO

## 2019-11-01 NOTE — NURSING NOTE
Injectable influenza vaccine documentation    1. Has the patient received the information for the influenza vaccine? YES    2. Does the patient have a severe allergy to eggs (Patients with a severe egg allergy should be assessed by a medical provider, RN, or clinical pharmacist. If they receive the influenza vaccine, please have them observed for 15 minutes.)? No    3. Has the patient had an allergic reaction to previous influenza vaccines? No    4. Has the patient had any severe allergic reactions to past influenza vaccines ? No       5. Does patient have a history of Guillain-Belden syndrome? No         Based on responses above, I administered the influenza vaccine.  November Paw, CMA

## 2019-11-01 NOTE — NURSING NOTE
Due to patient being non-English speaking/uses sign language, an  was used for this visit. Only for face-to-face interpretation by an external agency, date and length of interpretation can be found on the scanned worksheet.     name: Rick Vázquez  Agency: [employer]  Language: Maciej   Telephone number: [phone]  Type of interpretation: Face-to-face, spoken

## 2019-11-02 ASSESSMENT — ANXIETY QUESTIONNAIRES: GAD7 TOTAL SCORE: 14

## 2019-11-04 ENCOUNTER — RECORDS - HEALTHEAST (OUTPATIENT)
Dept: ADMINISTRATIVE | Facility: OTHER | Age: 79
End: 2019-11-04

## 2019-11-05 ENCOUNTER — MEDICAL CORRESPONDENCE (OUTPATIENT)
Dept: HEALTH INFORMATION MANAGEMENT | Facility: CLINIC | Age: 79
End: 2019-11-05

## 2019-11-06 DIAGNOSIS — K21.9 GASTROESOPHAGEAL REFLUX DISEASE, ESOPHAGITIS PRESENCE NOT SPECIFIED: ICD-10-CM

## 2019-11-16 ENCOUNTER — HOSPITAL ENCOUNTER (OUTPATIENT)
Dept: MRI IMAGING | Facility: HOSPITAL | Age: 79
Discharge: HOME | End: 2019-11-16
Attending: FAMILY MEDICINE

## 2019-11-16 DIAGNOSIS — G89.29 CHRONIC RIGHT-SIDED LOW BACK PAIN WITH RIGHT-SIDED SCIATICA: ICD-10-CM

## 2019-11-16 DIAGNOSIS — M54.41 CHRONIC RIGHT-SIDED LOW BACK PAIN WITH RIGHT-SIDED SCIATICA: ICD-10-CM

## 2019-11-18 DIAGNOSIS — M54.41 CHRONIC RIGHT-SIDED LOW BACK PAIN WITH RIGHT-SIDED SCIATICA: ICD-10-CM

## 2019-11-18 DIAGNOSIS — G89.29 CHRONIC RIGHT-SIDED LOW BACK PAIN WITH RIGHT-SIDED SCIATICA: ICD-10-CM

## 2019-11-18 DIAGNOSIS — E11.9 TYPE 2 DIABETES MELLITUS WITHOUT COMPLICATION, WITHOUT LONG-TERM CURRENT USE OF INSULIN (H): ICD-10-CM

## 2019-11-20 ENCOUNTER — MEDICAL CORRESPONDENCE (OUTPATIENT)
Dept: HEALTH INFORMATION MANAGEMENT | Facility: CLINIC | Age: 79
End: 2019-11-20

## 2019-11-20 ENCOUNTER — DOCUMENTATION ONLY (OUTPATIENT)
Dept: FAMILY MEDICINE | Facility: CLINIC | Age: 79
End: 2019-11-20

## 2019-11-20 NOTE — PROGRESS NOTES
To be completed in Nursing note:    Please reference list for forms that require a visit for completion.  Please remind patients that providers are given 3-5 business days to complete and return forms.      Form type:  [facility] / Physical Therapy Discharge Summary     Date form received:  2019    Date form completed by Physician:   2019     How was form returned to patient (mailed, faxed, or at  for patient to ):  Will fax to [phone]    Date form mailed/faxed/left at  for patient and sent to HIM for [email or web address]      Once form is left for patient, faxed, or mailed PCS will then close the documentation only encounter.

## 2019-11-20 NOTE — PROGRESS NOTES
To be completed in Nursing note:    Please reference list for forms that require a visit for completion.  Please remind patients that providers are given 3-5 business days to complete and return forms.      Form type:  [facility] / Occupational Therapy Discharge Summary    Date form received:  2019    Date form completed by Physician:  2019    How was form returned to patient (mailed, faxed, or at  for patient to ):  Will fax to [phone]    Date form mailed/faxed/left at  for patient and sent to HIM for [email or web address]      Once form is left for patient, faxed, or mailed PCS will then close the documentation only encounter.

## 2019-11-22 ENCOUNTER — MEDICAL CORRESPONDENCE (OUTPATIENT)
Dept: HEALTH INFORMATION MANAGEMENT | Facility: CLINIC | Age: 79
End: 2019-11-22

## 2019-11-26 ENCOUNTER — DOCUMENTATION ONLY (OUTPATIENT)
Dept: FAMILY MEDICINE | Facility: CLINIC | Age: 79
End: 2019-11-26

## 2019-11-26 ENCOUNTER — MEDICAL CORRESPONDENCE (OUTPATIENT)
Dept: HEALTH INFORMATION MANAGEMENT | Facility: CLINIC | Age: 79
End: 2019-11-26

## 2019-11-26 NOTE — PROGRESS NOTES
To be completed in Nursing note:    Please reference list for forms that require a visit for completion.  Please remind patients that providers are given 3-5 business days to complete and return forms.      Form type:  [facility] / Plan of Care     Date form received:  2019    Date form completed by Physician:  2019    How was form returned to patient (mailed, faxed, or at  for patient to ):  Will fax to [phone]    Date form mailed/faxed/left at  for patient and sent to HIM for [email or web address]      Once form is left for patient, faxed, or mailed PCS will then close the documentation only encounter.

## 2019-11-27 DIAGNOSIS — G62.9 NEUROPATHY: ICD-10-CM

## 2019-11-27 RX ORDER — GABAPENTIN 100 MG/1
100 CAPSULE ORAL 3 TIMES DAILY
Qty: 90 CAPSULE | Refills: 3 | Status: SHIPPED | OUTPATIENT
Start: 2019-11-27 | End: 2020-03-17

## 2019-12-02 ENCOUNTER — MEDICAL CORRESPONDENCE (OUTPATIENT)
Dept: HEALTH INFORMATION MANAGEMENT | Facility: CLINIC | Age: 79
End: 2019-12-02

## 2019-12-03 ENCOUNTER — DOCUMENTATION ONLY (OUTPATIENT)
Dept: FAMILY MEDICINE | Facility: CLINIC | Age: 79
End: 2019-12-03

## 2019-12-03 NOTE — PROGRESS NOTES
To be completed in Nursing note:    Please reference list for forms that require a visit for completion.  Please remind patients that providers are given 3-5 business days to complete and return forms.      Form type:  [facility] Physical Therapy Discharge Summary and Occupational Therapy Discharge Summary     Date form received:  2019    Date form completed by Physician:  2019    How was form returned to patient (mailed, faxed, or at  for patient to ):  Will fax to [phone]    Date form mailed/faxed/left at  for patient and sent to HIM for [email or web address]      Once form is left for patient, faxed, or mailed PCS will then close the documentation only encounter.

## 2019-12-04 ENCOUNTER — DOCUMENTATION ONLY (OUTPATIENT)
Dept: FAMILY MEDICINE | Facility: CLINIC | Age: 79
End: 2019-12-04

## 2019-12-04 NOTE — PROGRESS NOTES
To be completed in Nursing note:    Please reference list for forms that require a visit for completion.  Please remind patients that providers are given 3-5 business days to complete and return forms.      Form type: Occupational Therapy Discharge Summary from [facility]     Date form received: 19    Date form completed by Physician: 19    How was form returned to patient (mailed, faxed, or at  for patient to ): fax to [phone]    Date form mailed/faxed/left at  for patient and sent to HIM for [email or web address]      Once form is left for patient, faxed, or mailed PCS will then close the documentation only encounter.

## 2019-12-04 NOTE — PROGRESS NOTES
To be completed in Nursing note:    Please reference list for forms that require a visit for completion.  Please remind patients that providers are given 3-5 business days to complete and return forms.      Form type: Plan of Care from [facility]    Date form received: 19    Date form completed by Physician: 19    How was form returned to patient (mailed, faxed, or at  for patient to ): fax to [phone]    Date form mailed/faxed/left at  for patient and sent to HIM for scannin19      Once form is left for patient, faxed, or mailed PCS will then close the documentation only encounter.

## 2019-12-05 ENCOUNTER — OFFICE VISIT (OUTPATIENT)
Dept: FAMILY MEDICINE | Facility: CLINIC | Age: 79
End: 2019-12-05
Payer: COMMERCIAL

## 2019-12-05 ENCOUNTER — MEDICAL CORRESPONDENCE (OUTPATIENT)
Dept: HEALTH INFORMATION MANAGEMENT | Facility: CLINIC | Age: 79
End: 2019-12-05

## 2019-12-05 VITALS
BODY MASS INDEX: 27.02 KG/M2 | OXYGEN SATURATION: 98 % | HEART RATE: 70 BPM | DIASTOLIC BLOOD PRESSURE: 56 MMHG | RESPIRATION RATE: 16 BRPM | WEIGHT: 143 LBS | TEMPERATURE: 98.4 F | SYSTOLIC BLOOD PRESSURE: 109 MMHG

## 2019-12-05 DIAGNOSIS — M15.9 OSTEOARTHRITIS OF MULTIPLE JOINTS, UNSPECIFIED OSTEOARTHRITIS TYPE: Primary | ICD-10-CM

## 2019-12-05 DIAGNOSIS — G89.29 CHRONIC RIGHT-SIDED LOW BACK PAIN WITH RIGHT-SIDED SCIATICA: ICD-10-CM

## 2019-12-05 DIAGNOSIS — Z86.73 HISTORY OF CVA (CEREBROVASCULAR ACCIDENT): ICD-10-CM

## 2019-12-05 DIAGNOSIS — K21.9 GASTROESOPHAGEAL REFLUX DISEASE, ESOPHAGITIS PRESENCE NOT SPECIFIED: ICD-10-CM

## 2019-12-05 DIAGNOSIS — I10 ESSENTIAL HYPERTENSION WITH GOAL BLOOD PRESSURE LESS THAN 140/90: ICD-10-CM

## 2019-12-05 DIAGNOSIS — M54.41 CHRONIC RIGHT-SIDED LOW BACK PAIN WITH RIGHT-SIDED SCIATICA: ICD-10-CM

## 2019-12-05 RX ORDER — ACETAMINOPHEN 325 MG/1
325-650 TABLET ORAL EVERY 6 HOURS PRN
Qty: 100 TABLET | Refills: 0 | Status: SHIPPED | OUTPATIENT
Start: 2019-12-05 | End: 2019-12-27

## 2019-12-05 ASSESSMENT — PATIENT HEALTH QUESTIONNAIRE - PHQ9: SUM OF ALL RESPONSES TO PHQ QUESTIONS 1-9: 18

## 2019-12-05 NOTE — NURSING NOTE
Due to patient being non-English speaking/uses sign language, an  was used for this visit. Only for face-to-face interpretation by an external agency, date and length of interpretation can be found on the scanned worksheet.     name: Rick  Agency: [employer]  Language: Dorina   Telephone number: [phone]  Type of interpretation: Face-to-face, spoken

## 2019-12-05 NOTE — PATIENT INSTRUCTIONS
"Ongoing issues with RIGHT side mostly. Pain. Hurts when walking. Hard to get comfortable to sleep. Appetite has been poor.  Completed home physical therapy. Pains about the same.  Started on duloxetine, gabapentin to help with pains, mood.    Had MRI on back:  Some additional \"wear and tear\" changes in region ABOVE where surgery was done    Episodes of nausea, vomitting. Seen at [facility] ED. Had Head CT with findings of \"old stroke\"; probably occurred 10+ years ago prior to being in [place]  Has diabetes, but this has been well controlled.    1) Bring all medications with to next visit.  2) limit Ibuprofen use. Use tylenol instead if needing something in addition to scheduled medications for pain    PHQ-9 at 18.  On duloxetine 30mg; re assess next visit.  Recheck in ONE month  "

## 2019-12-05 NOTE — PROGRESS NOTES
"       SUBJECTIVE       Rafy Riggins is a 79 year old  male with a PMH significant for:     Patient Active Problem List   Diagnosis     HTN (hypertension)     Back pain     Poor vision     S/P lumbar spinal fusion     Colon polyp     Diabetes mellitus, type 2 (H)     Obesity     Lumbosacral radiculopathy at S1     Gout of foot     Hypothyroidism     Ongoing issues with RIGHT side mostly. Pain. Hurts when walking. Hard to get comfortable to sleep. Appetite has been poor.  Completed home physical therapy. Pains about the same.  Started on duloxetine, gabapentin to help with pains, mood.    Had MRI on back:  Some additional \"wear and tear\" changes in region ABOVE where surgery was done    Episodes of nausea, vomitting. Seen at [facility] ED. Had Head CT with findings of \"old stroke\"; probably occurred 10+ years ago prior to being in [place]  Has diabetes, but this has been well controlled.      PMH, Medications and Allergies were reviewed and updated as needed.        REVIEW OF SYSTEMS     General: No fevers, chills, sweats, unexplained weight loss  Head: No headache  Neck: No swallowing problems   CV: No chest pain or palpitations  Resp: No shortness of breath.  No cough. No hemoptysis.  GI: No constipation, diarrhea, or blood in stool.  no nausea or vomiting  : No pain passing urine or urinary frequency            OBJECTIVE     Vitals:    12/05/19 0831   BP: 109/56   BP Location: Left arm   Pulse: 70   Resp: 16   Temp: 98.4  F (36.9  C)   TempSrc: Oral   SpO2: 98%   Weight: 64.9 kg (143 lb)     Body mass index is 27.02 kg/m .    Gen: adequately nourished and in NAD  HEENT: PERRLA; TMs normal color and landmarks; nasopharynx pink and moist; oropharynx pink and moist  Neck: supple without lymphadenopathy  CV:  RRR  - no murmurs, rubs, or gallups,   Pulm:  CTAB, no wheezes/rales/rhonchi, good air entry   ABD: soft, nontender, no masses, no rebound, BS intact throughout  Extrem: Right upper > lower weakness " "(+pronator drift)  Back: diffuse paraspinal muscle tenderness and spasm. SLR limited by generalized pain, but +/- true radiculopathy    No results found for this or any previous visit (from the past 24 hour(s)).        ASSESSMENT AND PLAN     Rafy was seen today for recheck.    Diagnoses and all orders for this visit:    Osteoarthritis of multiple joints, unspecified osteoarthritis type  -     acetaminophen (TYLENOL) 325 MG tablet; Take 1-2 tablets (325-650 mg) by mouth every 6 hours as needed for mild pain    Chronic right-sided low back pain with right-sided sciatica    Gastroesophageal reflux disease, esophagitis presence not specified    Essential hypertension with goal blood pressure less than 140/90    History of CVA (cerebrovascular accident)  Comments:  Episode of Right sided weakness \"years ago\" prior to entering [place]. Head CT 11/2019 with \"old\" findings.  On asprin, atorvastain. No SMK        Patient Instructions   Ongoing issues with RIGHT side mostly. Pain. Hurts when walking. Hard to get comfortable to sleep. Appetite has been poor.  Completed home physical therapy. Pains about the same.  Started on duloxetine, gabapentin to help with pains, mood.    Had MRI on back:  Some additional \"wear and tear\" changes in region ABOVE where surgery was done    Episodes of nausea, vomitting. Seen at [facility] ED. Had Head CT with findings of \"old stroke\"; probably occurred 10+ years ago prior to being in [place]  Has diabetes, but this has been well controlled.    1) Bring all medications with to next visit.  2) limit Ibuprofen use. Use tylenol instead if needing something in addition to scheduled medications for pain    PHQ-9 at 18.  On duloxetine 30mg; re assess next visit.  Recheck in ONE month      Total of 30 minutes was spent in face to face contact with patient with > 50% in counseling and coordination of care.  Options for treatment and/or follow-up care were reviewed with the patient. Betileighann Adrian Riggins was " engaged and actively involved in the decision making process. He verbalized understanding of the options discussed and was satisfied with the final plan.    Kristian Muhammad MD MD

## 2019-12-11 ENCOUNTER — DOCUMENTATION ONLY (OUTPATIENT)
Dept: FAMILY MEDICINE | Facility: CLINIC | Age: 79
End: 2019-12-11

## 2019-12-11 NOTE — PROGRESS NOTES
To be completed in Nursing note:    Please reference list for forms that require a visit for completion.  Please remind patients that providers are given 3-5 business days to complete and return forms.      Form type: Physicians Orders from [facility]     Date form received: 19    Date form completed by Physician: 19    How was form returned to patient (mailed, faxed, or at  for patient to ): fax to [phone]    Date form mailed/faxed/left at  for patient and sent to HIM for [email or web address]      Once form is left for patient, faxed, or mailed PCS will then close the documentation only encounter.

## 2019-12-12 ENCOUNTER — MEDICAL CORRESPONDENCE (OUTPATIENT)
Dept: HEALTH INFORMATION MANAGEMENT | Facility: CLINIC | Age: 79
End: 2019-12-12

## 2019-12-30 ENCOUNTER — MEDICAL CORRESPONDENCE (OUTPATIENT)
Dept: HEALTH INFORMATION MANAGEMENT | Facility: CLINIC | Age: 79
End: 2019-12-30

## 2020-01-02 ENCOUNTER — DOCUMENTATION ONLY (OUTPATIENT)
Dept: FAMILY MEDICINE | Facility: CLINIC | Age: 80
End: 2020-01-02

## 2020-01-02 NOTE — PROGRESS NOTES
To be completed in Nursing note:    Please reference list for forms that require a visit for completion.  Please remind patients that providers are given 3-5 business days to complete and return forms.      Form type:  [facility] / Prescription / Certificate of Medical Necessity     Date form received:   2019    Date form completed by Physician:  2019    How was form returned to patient (mailed, faxed, or at  for patient to ):  Fax to [phone]    Date form mailed/faxed/left at  for patient and sent to HIM for [email or web address]      Once form is left for patient, faxed, or mailed PCS will then close the documentation only encounter.

## 2020-01-03 ENCOUNTER — OFFICE VISIT (OUTPATIENT)
Dept: FAMILY MEDICINE | Facility: CLINIC | Age: 80
End: 2020-01-03
Payer: COMMERCIAL

## 2020-01-03 VITALS
HEART RATE: 64 BPM | BODY MASS INDEX: 27.36 KG/M2 | DIASTOLIC BLOOD PRESSURE: 68 MMHG | TEMPERATURE: 98.1 F | WEIGHT: 144.8 LBS | OXYGEN SATURATION: 95 % | SYSTOLIC BLOOD PRESSURE: 135 MMHG | RESPIRATION RATE: 16 BRPM

## 2020-01-03 DIAGNOSIS — R19.5 LOOSE STOOLS: Primary | ICD-10-CM

## 2020-01-03 RX ORDER — CHOLESTYRAMINE 4 G/9G
1-4 POWDER, FOR SUSPENSION ORAL DAILY
Qty: 378 G | Refills: 3 | Status: SHIPPED | OUTPATIENT
Start: 2020-01-03 | End: 2023-06-29

## 2020-01-03 ASSESSMENT — PATIENT HEALTH QUESTIONNAIRE - PHQ9: SUM OF ALL RESPONSES TO PHQ QUESTIONS 1-9: 16

## 2020-01-03 NOTE — PATIENT INSTRUCTIONS
1) Continue Duloxetine at 30mg  2) Increase Tylenol to Two tablets up to 3-4 times a day.  3) use to powder to help keep stools formed and not loose. Take with evening medications    Recheck in one month

## 2020-01-03 NOTE — NURSING NOTE
Due to patient being non-English speaking/uses sign language, an  was used for this visit. Only for face-to-face interpretation by an external agency, date and length of interpretation can be found on the scanned worksheet.     name: Rick Soria  Agency: [employer]  Language: Maltese     Telephone number: [phone]  Type of interpretation: Face-to-face, spoken

## 2020-01-07 NOTE — PROGRESS NOTES
SUBJECTIVE       Rafy Riggins is a 79 year old  male with a PMH significant for:     Patient Active Problem List   Diagnosis     HTN (hypertension)     Back pain     Poor vision     S/P lumbar spinal fusion     Colon polyp     Diabetes mellitus, type 2 (H)     Obesity     Lumbosacral radiculopathy at S1     Gout of foot     Hypothyroidism     On duloxetine. Tolerating. Mild improvement in mood, motivation. Having episodic loose stools. Had this prior to starting duloxetine, unclear if worse since.      PMH, Medications and Allergies were reviewed and updated as needed.        REVIEW OF SYSTEMS     General: No fevers, chills, sweats, unexplained weight loss  Head: No headache  Neck: No swallowing problems   CV: No chest pain or palpitations  Resp: No shortness of breath.  No cough. No hemoptysis.  GI: No constipation, diarrhea, or blood in stool.  no nausea or vomiting  : No pain passing urine or urinary frequency            OBJECTIVE     Vitals:    01/03/20 0927   BP: 135/68   BP Location: Right arm   Patient Position: Sitting   Cuff Size: Adult Regular   Pulse: 64   Resp: 16   Temp: 98.1  F (36.7  C)   TempSrc: Oral   SpO2: 95%   Weight: 65.7 kg (144 lb 12.8 oz)     Body mass index is 27.36 kg/m .    Gen: adequately nourished and in NAD  HEENT: PERRLA; TMs normal color and landmarks; nasopharynx pink and moist; oropharynx pink and moist  Neck: supple without lymphadenopathy  CV:  RRR  - no murmurs, rubs, or gallups,   Pulm:  CTAB, no wheezes/rales/rhonchi, good air entry   ABD: soft, nontender, no masses, no rebound, BS intact throughout  Extrem: Right upper > lower weakness (+pronator drift)  Back: diffuse paraspinal muscle tenderness and spasm. SLR limited by generalized pain, but +/- true radiculopathy    No results found for this or any previous visit (from the past 24 hour(s)).        ASSESSMENT AND PLAN     Rafy was seen today for recheck and medication reconciliation.    Diagnoses and all  orders for this visit:    Loose stools  -     cholestyramine (QUESTRAN) 4 GM/DOSE powder; Take 1-4 g by mouth daily To keep stools formed and not loose        Patient Instructions   1) Continue Duloxetine at 30mg  2) Increase Tylenol to Two tablets up to 3-4 times a day.  3) use to powder to help keep stools formed and not loose. Take with evening medications    Recheck in one month      Total of 15 minutes was spent in face to face contact with patient with > 50% in counseling and coordination of care.  Options for treatment and/or follow-up care were reviewed with the patient. Rafy Riggins was engaged and actively involved in the decision making process. He verbalized understanding of the options discussed and was satisfied with the final plan.    Kristian Muhammad MD MD

## 2020-01-20 DIAGNOSIS — E03.9 HYPOTHYROIDISM, UNSPECIFIED TYPE: ICD-10-CM

## 2020-01-20 DIAGNOSIS — M1A.9XX0 CHRONIC GOUT WITHOUT TOPHUS, UNSPECIFIED CAUSE, UNSPECIFIED SITE: ICD-10-CM

## 2020-01-20 DIAGNOSIS — I10 ESSENTIAL HYPERTENSION WITH GOAL BLOOD PRESSURE LESS THAN 140/90: ICD-10-CM

## 2020-01-20 RX ORDER — ALLOPURINOL 100 MG/1
100 TABLET ORAL DAILY
Qty: 30 TABLET | Refills: 11 | Status: SHIPPED | OUTPATIENT
Start: 2020-01-20 | End: 2020-12-24

## 2020-01-20 RX ORDER — LEVOTHYROXINE SODIUM 75 UG/1
75 TABLET ORAL DAILY
Qty: 90 TABLET | Refills: 3 | Status: SHIPPED | OUTPATIENT
Start: 2020-01-20 | End: 2020-12-24

## 2020-01-20 RX ORDER — LISINOPRIL 20 MG/1
20 TABLET ORAL DAILY
Qty: 90 TABLET | Refills: 3 | Status: SHIPPED | OUTPATIENT
Start: 2020-01-20 | End: 2020-12-24

## 2020-01-24 ENCOUNTER — OFFICE VISIT (OUTPATIENT)
Dept: FAMILY MEDICINE | Facility: CLINIC | Age: 80
End: 2020-01-24
Payer: COMMERCIAL

## 2020-01-24 VITALS
HEART RATE: 90 BPM | SYSTOLIC BLOOD PRESSURE: 115 MMHG | TEMPERATURE: 98.9 F | OXYGEN SATURATION: 97 % | RESPIRATION RATE: 18 BRPM | WEIGHT: 147 LBS | DIASTOLIC BLOOD PRESSURE: 62 MMHG | BODY MASS INDEX: 27.78 KG/M2

## 2020-01-24 DIAGNOSIS — M15.9 OSTEOARTHRITIS OF MULTIPLE JOINTS, UNSPECIFIED OSTEOARTHRITIS TYPE: ICD-10-CM

## 2020-01-24 RX ORDER — DULOXETIN HYDROCHLORIDE 60 MG/1
60 CAPSULE, DELAYED RELEASE ORAL DAILY
Qty: 30 CAPSULE | Refills: 11 | Status: SHIPPED | OUTPATIENT
Start: 2020-01-24 | End: 2020-08-05

## 2020-01-24 NOTE — PATIENT INSTRUCTIONS
"Stools better. Sleeping a little better, but still with the pains.  Has questions on the assessment for services he had.  Wonders about PCA hours, etc. Family members serve as PCA's.  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  Get down when he thinks about his physical condition, dependence on others, etc.    1) Increase duloxetine to 60mg daily  2) I will check on options for \"second opinion\" on services.  3) Recheck in 4 weeks  "

## 2020-01-27 ENCOUNTER — TELEPHONE (OUTPATIENT)
Dept: FAMILY MEDICINE | Facility: CLINIC | Age: 80
End: 2020-01-27

## 2020-01-27 NOTE — PROGRESS NOTES
SUBJECTIVE       Rafy Riggins is a 79 year old  male with a PMH significant for:     Patient Active Problem List   Diagnosis     HTN (hypertension)     Back pain     Poor vision     S/P lumbar spinal fusion     Colon polyp     Diabetes mellitus, type 2 (H)     Obesity     Lumbosacral radiculopathy at S1     Gout of foot     Hypothyroidism     Stools better. Sleeping a little better, but still with the pains.  Has questions on the assessment for services he had.  Wonders about PCA hours, etc. Family members serve as PCA's.  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  Get down when he thinks about his physical condition, dependence on others, etc.    PMH, Medications and Allergies were reviewed and updated as needed.        REVIEW OF SYSTEMS     General: No fevers, chills, sweats, unexplained weight loss  Head: No headache  Neck: No swallowing problems   CV: No chest pain or palpitations  Resp: No shortness of breath.  No cough. No hemoptysis.  GI: No constipation, diarrhea, or blood in stool.  no nausea or vomiting  : No pain passing urine or urinary frequency            OBJECTIVE     Vitals:    01/24/20 0947   BP: 115/62   BP Location: Left arm   Patient Position: Sitting   Cuff Size: Adult Regular   Pulse: 90   Resp: 18   Temp: 98.9  F (37.2  C)   TempSrc: Oral   SpO2: 97%   Weight: 66.7 kg (147 lb)     Body mass index is 27.78 kg/m .    Gen: adequately nourished and in NAD  HEENT: PERRLA; TMs normal color and landmarks; nasopharynx pink and moist; oropharynx pink and moist  Neck: supple without lymphadenopathy  CV:  RRR  - no murmurs, rubs, or gallups,   Pulm:  CTAB, no wheezes/rales/rhonchi, good air entry   ABD: soft, nontender, no masses, no rebound, BS intact throughout  Extrem: Right upper > lower weakness (+pronator drift)  Back: diffuse paraspinal muscle tenderness and spasm. SLR limited by generalized pain, but +/- true radiculopathy    No results found for this or  "any previous visit (from the past 24 hour(s)).        ASSESSMENT AND PLAN     Rafy was seen today for recheck.    Diagnoses and all orders for this visit:    Osteoarthritis of multiple joints, unspecified osteoarthritis type  -     DULoxetine (CYMBALTA) 60 MG capsule; Take 1 capsule (60 mg) by mouth daily        Patient Instructions   Stools better. Sleeping a little better, but still with the pains.  Has questions on the assessment for services he had.  Wonders about PCA hours, etc. Family members serve as PCA's.  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  Get down when he thinks about his physical condition, dependence on others, etc.    1) Increase duloxetine to 60mg daily  2) I will check on options for \"second opinion\" on services.  3) Recheck in 4 weeks      Total of 15 minutes was spent in face to face contact with patient with > 50% in counseling and coordination of care.  Options for treatment and/or follow-up care were reviewed with the patient. Rafy Campbell Gilson was engaged and actively involved in the decision making process. He verbalized understanding of the options discussed and was satisfied with the final plan.    Kristian Muhammad MD MD        "

## 2020-01-27 NOTE — TELEPHONE ENCOUNTER
Social Work Note:    Data and Intervention:     SW called Pt with mental health resources that are covered by his insurance, [employer]. No answer. Did not leave a voicemail. Will attempt again tomorrow.     [facility]   [facility] [address] [address]  [place], [place]   [phone]    [address] [place] [address]  [phone]    Assessment and Plan:     SW will provide the above resources via mail along with adult day services handout following two additional phone attempts.     Due to patient being non-English speaking/uses sign language, an  was used for this visit. Only for face-to-face interpretation by an external agency, date and length of interpretation can be found on the scanned worksheet.     name: [id number]  Agency: [employer] - telephone  Language: Linksify   Telephone number: [phone]  Type of interpretation: Telephone, spoken       ADDENDUM 1/28/2020 4:04 PM:    SW attempted another call out to Pt. No answer. Did not leave a v/m. SW will attempt again this week.     Due to patient being non-English speaking/uses sign language, an  was used for this visit. Only for face-to-face interpretation by an external agency, date and length of interpretation can be found on the scanned worksheet.     name: [id number]  Agency: [employer] - telephone  Language: Linksify   Telephone number: [phone]  Type of interpretation: Telephone, spoken          JAI Montaño

## 2020-01-28 ENCOUNTER — MEDICAL CORRESPONDENCE (OUTPATIENT)
Dept: HEALTH INFORMATION MANAGEMENT | Facility: CLINIC | Age: 80
End: 2020-01-28

## 2020-01-28 ENCOUNTER — DOCUMENTATION ONLY (OUTPATIENT)
Dept: FAMILY MEDICINE | Facility: CLINIC | Age: 80
End: 2020-01-28

## 2020-01-28 ENCOUNTER — TRANSFERRED RECORDS (OUTPATIENT)
Dept: HEALTH INFORMATION MANAGEMENT | Facility: CLINIC | Age: 80
End: 2020-01-28

## 2020-01-28 NOTE — PROGRESS NOTES
To be completed in Nursing note:    Please reference list for forms that require a visit for completion.  Please remind patients that providers are given 3-5 business days to complete and return forms.      Form type:  [employer] [facility] / Plan of Care     Date form received:   2020    Date form completed by Physician:  2020    How was form returned to patient (mailed, faxed, or at  for patient to ):  Fax to [phone]    Date form mailed/faxed/left at  for patient and sent to HIM for [email or web address]      Once form is left for patient, faxed, or mailed PCS will then close the documentation only encounter.

## 2020-01-29 ENCOUNTER — TELEPHONE (OUTPATIENT)
Dept: FAMILY MEDICINE | Facility: CLINIC | Age: 80
End: 2020-01-29

## 2020-01-29 ENCOUNTER — DOCUMENTATION ONLY (OUTPATIENT)
Dept: FAMILY MEDICINE | Facility: CLINIC | Age: 80
End: 2020-01-29

## 2020-01-29 NOTE — TELEPHONE ENCOUNTER
Social Work Note:    Data and Intervention:     SW called Pt with mental health resources that are covered by his insurance, [employer]. No answer. Did not leave a voicemail.     [facility]   [address] [address] [address]  [place], [place]   [phone]    [address] [place] [address]  [phone]    Assessment and Plan:     SW will provide the above resources via mail along with adult day services handout.     Due to patient being non-English speaking/uses sign language, an  was used for this visit. Only for face-to-face interpretation by an external agency, date and length of interpretation can be found on the scanned worksheet.     name: [id number]  Agency: [employer] - telephone  Language: Mongolian   Telephone number: [phone]  Type of interpretation: Telephone, spoken     JAI Montaño

## 2020-01-29 NOTE — PROGRESS NOTES
To be completed in Nursing note:    Please reference list for forms that require a visit for completion.  Please remind patients that providers are given 3-5 business days to complete and return forms.      Form type:  [employer] / Confirmation Of Order     Date form received:  2020    Date form completed by Physician:  2020    How was form returned to patient (mailed, faxed, or at  for patient to ):  Fax to [phone]    Date form mailed/faxed/left at  for patient and sent to HIM for [email or web address]                         Once form is left for patient, faxed, or mailed PCS will then close the documentation only encounter.

## 2020-01-29 NOTE — LETTER
January 29, 2020      Rafy Riggins  [address]  [place] [facility] [address]        Dear Rafy,    Below are the community resources our behavior health team has referred for you. Please review them and contact the one of your choice to schedule re-occurring psychotherapy appointments. Contact us back at [facility] at [phone] if you have any questions, need help scheduling, or if a referral is needed by the chosen provider prior to beginning services.    [facility]   [address] [place] [address] [address]  [place], [place]   [phone]    [address] [place] [address]  [phone]      Sincerely,          JAI Montaño

## 2020-01-30 ENCOUNTER — MEDICAL CORRESPONDENCE (OUTPATIENT)
Dept: HEALTH INFORMATION MANAGEMENT | Facility: CLINIC | Age: 80
End: 2020-01-30

## 2020-01-30 DIAGNOSIS — E78.5 HYPERLIPIDEMIA LDL GOAL <100: ICD-10-CM

## 2020-01-30 RX ORDER — ATORVASTATIN CALCIUM 40 MG/1
40 TABLET, FILM COATED ORAL DAILY
Qty: 90 TABLET | Refills: 3 | Status: SHIPPED | OUTPATIENT
Start: 2020-01-30 | End: 2021-01-08

## 2020-01-31 ENCOUNTER — DOCUMENTATION ONLY (OUTPATIENT)
Dept: FAMILY MEDICINE | Facility: CLINIC | Age: 80
End: 2020-01-31

## 2020-01-31 NOTE — PROGRESS NOTES
To be completed in Nursing note:    Please reference list for forms that require a visit for completion.  Please remind patients that providers are given 3-5 business days to complete and return forms.      Form type:  [employer] / Prescription/ Certificate of Medical Necessity     Date form received:  2020    Date form completed by Physician: 2020    How was form returned to patient (mailed, faxed, or at  for patient to ):  Will fax to [phone]    Date form mailed/faxed/left at  for patient and sent to HIM for [email or web address]         Once form is left for patient, faxed, or mailed PCS will then close the documentation only encounter.

## 2020-02-04 ENCOUNTER — MEDICAL CORRESPONDENCE (OUTPATIENT)
Dept: HEALTH INFORMATION MANAGEMENT | Facility: CLINIC | Age: 80
End: 2020-02-04

## 2020-02-21 ENCOUNTER — OFFICE VISIT (OUTPATIENT)
Dept: FAMILY MEDICINE | Facility: CLINIC | Age: 80
End: 2020-02-21
Payer: COMMERCIAL

## 2020-02-21 VITALS
OXYGEN SATURATION: 97 % | WEIGHT: 144.4 LBS | RESPIRATION RATE: 16 BRPM | HEART RATE: 76 BPM | BODY MASS INDEX: 27.28 KG/M2 | DIASTOLIC BLOOD PRESSURE: 71 MMHG | SYSTOLIC BLOOD PRESSURE: 119 MMHG | TEMPERATURE: 98.1 F

## 2020-02-21 DIAGNOSIS — D64.9 ANEMIA, UNSPECIFIED TYPE: ICD-10-CM

## 2020-02-21 DIAGNOSIS — I10 ESSENTIAL HYPERTENSION WITH GOAL BLOOD PRESSURE LESS THAN 140/90: Primary | ICD-10-CM

## 2020-02-21 DIAGNOSIS — E11.9 TYPE 2 DIABETES MELLITUS WITHOUT COMPLICATION, WITHOUT LONG-TERM CURRENT USE OF INSULIN (H): ICD-10-CM

## 2020-02-21 LAB
BASOPHILS # BLD AUTO: 0 THOU/UL (ref 0–0.2)
BASOPHILS NFR BLD AUTO: 1 % (ref 0–2)
BUN SERPL-MCNC: 12.2 MG/DL (ref 7–21)
CALCIUM SERPL-MCNC: 10.1 MG/DL (ref 8.5–10.1)
CHLORIDE SERPLBLD-SCNC: 102.3 MMOL/L (ref 98–110)
CO2 SERPL-SCNC: 27.6 MMOL/L (ref 20–32)
CREAT SERPL-MCNC: 1.2 MG/DL (ref 0.7–1.3)
EOSINOPHIL # BLD AUTO: 0.1 THOU/UL (ref 0–0.4)
EOSINOPHIL NFR BLD AUTO: 2 % (ref 0–6)
ERYTHROCYTE [DISTWIDTH] IN BLOOD BY AUTOMATED COUNT: 15.1 % (ref 11–14.5)
FERRITIN SERPL-MCNC: 30 NG/ML (ref 27–300)
GFR SERPL CREATININE-BSD FRML MDRD: 64.4 ML/MIN/1.7 M2
GLUCOSE SERPL-MCNC: 128.8 MG'DL (ref 70–99)
HBA1C MFR BLD: 6.1 % (ref 4.1–5.7)
HCT VFR BLD AUTO: 37.5 % (ref 40–54)
HGB BLD-MCNC: 11.6 G/DL (ref 14–18)
IRON SATN MFR SERPL: 21 % (ref 20–50)
IRON SERPL-MCNC: 57 UG/DL (ref 42–175)
LYMPHOCYTES # BLD AUTO: 1.5 THOU/UL (ref 0.8–4.4)
LYMPHOCYTES NFR BLD AUTO: 28 % (ref 20–40)
MCH RBC QN AUTO: 29.1 PG (ref 27–34)
MCHC RBC AUTO-ENTMCNC: 30.9 G/DL (ref 32–36)
MCV RBC AUTO: 94 FL (ref 80–100)
MONOCYTES # BLD AUTO: 0.5 THOU/UL (ref 0–0.9)
MONOCYTES NFR BLD AUTO: 10 % (ref 2–10)
NEUTROPHILS # BLD AUTO: 3.3 THOU/UL (ref 2–7.7)
NEUTROPHILS NFR BLD AUTO: 60 % (ref 50–70)
PLATELET # BLD AUTO: 244 THOU/UL (ref 140–440)
PMV BLD AUTO: 11.2 FL (ref 8.5–12.5)
POTASSIUM SERPL-SCNC: 5.3 MMOL/L (ref 3.2–4.6)
RBC # BLD AUTO: 3.98 MILL/UL (ref 4.4–6.2)
SODIUM SERPL-SCNC: 135 MMOL/L (ref 132–142)
TIBC SERPL-MCNC: 277 UG/DL (ref 313–563)
TRANSFERRIN SERPL-MCNC: 222 MG/DL (ref 212–360)
VIT B12 SERPL-MCNC: 408 PG/ML (ref 213–816)
WBC # BLD AUTO: 5.6 THOU/UL (ref 4–11)

## 2020-02-21 ASSESSMENT — PATIENT HEALTH QUESTIONNAIRE - PHQ9: SUM OF ALL RESPONSES TO PHQ QUESTIONS 1-9: 14

## 2020-02-21 NOTE — PROGRESS NOTES
"Social Work Note:    Data and Intervention:    SW met with Pt and Pt's son Collin ([phone]) face to face in Clinic in regards to psychotherapy resources, PCA services, and adult day care services.     Pt stated he takes medication for depression but the depression is \"worsening.\" Pt feels \"depressed,\" \"frustrated,\" and \"angry\" with \"feeling weaker\" and that although he has 8 male children, it feels like \"no one is there.\" Pt has 10 children total. Two of his male children and one female child of his provides PCA services in his home. SW provided another copy of the psychotherapy resources that were sent via mail. Pt said he is interested in giving psychotherapy a try.     [facility]   [address] [place] [address] [address]  [place], [place]   [phone]     [address] [place] [address]  [phone]    BREEZY will contact [employer]  JAI Bose ([phone]) to discuss an increase in PCA hours and a change in , as requested by Pt and Collin. Collin gave up his full-time job to take care of his father full-time. Collin lives with his dad because he requires 24/7 care. Collin has spoken to Kay and felt belittled by her when asking for additional PCA hours to be granted. He described their interaction as if having a conversation with an older sister and felt she was \"rude.\" BREEZY empathized with Collin and asked if the family would prefer to have other service providers come into the home to care for Pt;  other than only his children. Pt and Collin agreed to this. Collin expressed he would like to work full-time and come home at 4:00pm to his father. Collin could then care for his father through the night. Collin expressed a need for night shift care services as Pt stated, \"everything I need help with.\" Pt signed KARSON for SW to communicate with [employer]. SW included Kay in care teams.     SW asked Pt " if he is interested in out-of-home services and adult day services. Pt declined and stated he prefers to stay home. Collin talked about taking Pt to [facility] and the [place]. Otherwise, Pt enjoys watching movies and television at home. BREEZY asked if Pt and Collin would like to take the senior care resources and adult day resources with them, even though there is not an interest in them today. Pt and Collin took resource handouts with them today.       Assessment and Plan:    BREEZY will contact [employer]  JAI Bose ([phone]) to discuss an increase in PCA hours and a change in case mangers, as requested by Pt and Collin. SW will then contact Collin, as requested, to share the outcome.     SW contacted  Kay Gonzalez. She advised there will be no increase in PCA hours and that Pt was just evaluated in January. BREEZY shared that Pt requires 24/7 care. Kay stated there is no PCA coverage for 24/7 care needs. BREEZY shared with Kay that Pt's son has quit his full-time job because his dad needs someone there at all times. Kay stated it is the family's responsibility to care for Pt and asked SW if I knew that Collin just bought a five bedroom home. This statement is illegitimate to serving the Pt by offering the care that is needed. BREEZY asked for Kay's supervisor's contact information; Nilsa Robins ([phone]).     BREEZY called Nilsa Robins, Kay's supervisor, and left v/m.    JAI Montaño

## 2020-02-21 NOTE — NURSING NOTE
Due to patient being non-English speaking/uses sign language, an  was used for this visit. Only for face-to-face interpretation by an external agency, date and length of interpretation can be found on the scanned worksheet.     name: Rick Soria  Agency: Ashlie [employer]  Language: Maori    Telephone number: [phone]  Type of interpretation: Face-to-face, spoken

## 2020-02-21 NOTE — LETTER
March 4, 2020      Rafy Riggins  [address]  [facility] [place] [address]        Dear Rafy,    Blood test results have returned. No significant new findings, but I did find you were a little anemic. This could cause you to be more tired with less energy. When I see you at your next visit we will discuss this in more detail and if your not feeling much better, we might start an additional vitamin.    Please see below for your test results.    Resulted Orders   Ferritin ([facility])   Result Value Ref Range    Ferritin 30 27 - 300 ng/mL    Narrative    Test performed by:  [facility]  [address] [place] [address]   Iron Transferrin Saturat ([facility])   Result Value Ref Range    Iron 57 42 - 175 ug/dL    Transferrin 222 212 - 360 mg/dL    Transferrin Saturation 21 20 - 50 %    Transferrin  (L) 313 - 563 ug/dL    Narrative    Test performed by:  [facility] [address] [place] [address]   Basic Metabolic Panel ([facility])   Result Value Ref Range    Urea Nitrogen 12.2 7.0 - 21.0 mg/dL    Calcium 10.1 (H) 8.5 - 10.1 mg/dL    Chloride 102.3 98.0 - 110.0 mmol/L    Carbon Dioxide 27.6 20.0 - 32.0 mmol/L    Creatinine 1.2 0.7 - 1.3 mg/dL    Glucose 128.8 (H) 70.0 - 99.0 mg'dL    Potassium 5.3 (H) 3.2 - 4.6 mmol/L    Sodium 135.0 132.0 - 142.0 mmol/L    GFR Estimate 64.4 >60.0 mL/min/1.7 m2    GFR Estimate If Black 77.9 >60.0 mL/min/1.7 m2   Hemoglobin A1c ([facility])   Result Value Ref Range    Hemoglobin A1C 6.1 (H) 4.1 - 5.7 %   Vitamin B12 ([facility])   Result Value Ref Range    Vitamin B12 408 213 - 816 pg/mL    Narrative    Test performed by:  [facility]  [address] [place] [address]   CBC w/ Diff and Plt ([facility])   Result Value Ref Range    WBC 5.6 4.0 - 11.0 thou/uL    RBC 3.98 (L) 4.40 - 6.20 mill/uL    Hemoglobin 11.6 (L) 14.0 - 18.0 g/dL    Hematocrit 37.5 (L) 40.0 - 54.0 %    MCV 94 80 - 100 fL    MCH 29.1 27.0 - 34.0 pg    MCHC 30.9 (L) 32.0 - 36.0  g/dL    RDW 15.1 (H) 11.0 - 14.5 %    Platelets 244 140 - 440 thou/uL    Mean Platelet Volume 11.2 8.5 - 12.5 fL    % Neutrophils 60 50 - 70 %    % Lymphocytes 28 20 - 40 %    % Monocytes 10 2 - 10 %    % Eosinophils 2 0 - 6 %    % Basophils 1 0 - 2 %    Neutrophils (Absolute) 3.3 2.0 - 7.7 thou/uL    Lymphs (Absolute) 1.5 0.8 - 4.4 thou/uL    Monocytes(Absolute) 0.5 0.0 - 0.9 thou/uL    Eos (Absolute) 0.1 0.0 - 0.4 thou/uL    Baso (Absolute) 0.0 0.0 - 0.2 thou/uL    Narrative    Test performed by:  [facility]  [address] [place] [address]       If you have any questions, please call the clinic to make an appointment.    Sincerely,    Kristian Muhammad MD

## 2020-02-24 NOTE — PROGRESS NOTES
BREEZY left v/m with Collin in regards to a change in  through [employer] and informed him that he will receive a letter this week with new  name and contact information.     BREEZY shared via v/m message that if the family is still interested in having an outside person come into the home to perform PCA cares, other than having himself (Collin) or his siblings (Pt's children) take on all PCA cares, he can call their PCA agency, [facility] ([phone]) to request a change in PCAs.     BREEZY provided call back number for if there are questions or further assistance is needed.     JAI Montaño

## 2020-02-24 NOTE — PROGRESS NOTES
"Social Work Note:    Data and Intervention:     Nilsa Robins ([phone]), supervisor of [employer]  Kay Gonzalez, returned this worker's call. BREEZY advocated for increased PCA hours on behalf of the Pt. Nilsa shared with this worker that Pt cannot be given an increase in hours unless tube fed, wound care, IV therapies, or has complex behavior outbursts. Pt receives maximum number of care hours in all eight critical dependency areas including bathing, dressing, mobility, toileting, etc. Pt is receiving eight additional hrs/wk for \"homemaking.\" Pt is receiving a total of 53.5 hours per week of PCA care. Family is paid for those 53.5 hours per week. Nilsa advised assisting family in coordinating full-time care at a nursing home if Pt is in need of more intensive care than he is currently receiving or if family cannot maintain providing full-time care.     BREEZY inquired about family's request to have other PCA's come into the home to care for their father while they resume full-time employment during the daytime. Nilsa advised contacting the PCA agency directly, [facility] ([phone]).     Nilsa recognized the family's request for a change in . Nilsa is willing to assign a new  and said she will be sending a letter via mail to the family this week informing them of the change and will provide the assigned 's name and contact information. Nilsa will share the change with Kay this week.     Assessment and Plan:    BREEZY will call Collin, Pt's son, to share the above information.     BREEZY will share the [facility] resource information as well for additional in-home support.     JAI Montaño   "

## 2020-02-24 NOTE — PROGRESS NOTES
"       SUBJECTIVE       Rafy Riggins is a 79 year old  male with a PMH significant for:     Patient Active Problem List   Diagnosis     HTN (hypertension)     Back pain     Poor vision     S/P lumbar spinal fusion     Colon polyp     Diabetes mellitus, type 2 (H)     Obesity     Lumbosacral radiculopathy at S1     Gout of foot     Hypothyroidism     Stools better. Sleeping a little better, but still with the pains.  On cymbalta 60mg.  No new side effects.  Continues with Pains. Appetite remains decreased.  Episodes of \"dizzy\" continue    PMH, Medications and Allergies were reviewed and updated as needed.        REVIEW OF SYSTEMS     General: No fevers, chills, sweats, unexplained weight loss  Head: No headache  Neck: No swallowing problems   CV: No chest pain or palpitations  Resp: No shortness of breath.  No cough. No hemoptysis.  GI: No constipation, diarrhea, or blood in stool.  no nausea or vomiting  : No pain passing urine or urinary frequency            OBJECTIVE     Vitals:    02/21/20 0920   BP: 119/71   BP Location: Right arm   Patient Position: Sitting   Cuff Size: Adult Regular   Pulse: 76   Resp: 16   Temp: 98.1  F (36.7  C)   TempSrc: Oral   SpO2: 97%   Weight: 65.5 kg (144 lb 6.4 oz)     Body mass index is 27.28 kg/m .    Gen: adequately nourished and in NAD  HEENT: PERRLA; TMs normal color and landmarks; nasopharynx pink and moist; oropharynx pink and moist  Neck: supple without lymphadenopathy  CV:  RRR  - no murmurs, rubs, or gallups,   Pulm:  CTAB, no wheezes/rales/rhonchi, good air entry   ABD: soft, nontender, no masses, no rebound, BS intact throughout  Extrem: Right upper > lower weakness (+pronator drift)  Back: mild  paraspinal muscle tenderness and spasm. No true radiculopathy today on seated SLR    No results found for this or any previous visit (from the past 24 hour(s)).        ASSESSMENT AND PLAN     Rafy was seen today for recheck and medication reconciliation.    Diagnoses " "and all orders for this visit:    Anemia, unspecified type  -     Cancel: CBC with Diff Plt ([facility])  -     Ferritin ([facility])  -     Iron Transferrin Saturat ([facility])  -     Vitamin B12 ([facility])  -     CBC w/ Diff and Plt ([facility])    Type 2 diabetes mellitus without complication, without long-term current use of insulin (H)  -     Basic Metabolic Panel ([place])  -     Hemoglobin A1c ([facility])        Patient Instructions   On cymbalta 60mg. Sleep improved. No new side effects.  Continues with Pains. Appetite remains decreased.  Episodes of \"dizzy\" continue    1) Consider Meeting with therapist at [facility] or [facility]. Call phone number on sheet to schedule  2) USE meclizine as needed for dizzy  3) Continue Cymbalta    Recheck in one month    Total of 30 minutes was spent in face to face contact with patient with > 50% in counseling and coordination of care.  Options for treatment and/or follow-up care were reviewed with the patient. Rafy Riggins was engaged and actively involved in the decision making process. He verbalized understanding of the options discussed and was satisfied with the final plan.    Kristian Muhammad MD MD        "

## 2020-03-23 ENCOUNTER — DOCUMENTATION ONLY (OUTPATIENT)
Dept: FAMILY MEDICINE | Facility: CLINIC | Age: 80
End: 2020-03-23

## 2020-03-23 ENCOUNTER — MEDICAL CORRESPONDENCE (OUTPATIENT)
Dept: HEALTH INFORMATION MANAGEMENT | Facility: CLINIC | Age: 80
End: 2020-03-23

## 2020-03-23 NOTE — PROGRESS NOTES
To be completed in Nursing note:    Please reference list for forms that require a visit for completion.  Please remind patients that providers are given 3-5 business days to complete and return forms.      Form type:  [facility] / Plan of Care     Date form received:  2020    Date form completed by Physician:  2020    How was form returned to patient (mailed, faxed, or at  for patient to ):  Fax to [phone]    Date form mailed/faxed/left at  for patient and sent to HIM for scannin2020         Once form is left for patient, faxed, or mailed PCS will then close the documentation only encounter.

## 2020-04-03 DIAGNOSIS — R12 HEARTBURN: Primary | ICD-10-CM

## 2020-04-03 RX ORDER — FAMOTIDINE 40 MG/1
40 TABLET, FILM COATED ORAL DAILY PRN
Qty: 30 TABLET | Refills: 11 | Status: SHIPPED | OUTPATIENT
Start: 2020-04-03 | End: 2021-04-01

## 2020-04-03 NOTE — TELEPHONE ENCOUNTER
Per pharmacy pt is requesting refill for Ranitidine hcl 150 mg tab take 1 tablet po bid. This medication has been recalled. Please change to [facility] med and send new rx .  Please advise.      CASA Conti

## 2020-04-07 DIAGNOSIS — K21.9 GASTROESOPHAGEAL REFLUX DISEASE, ESOPHAGITIS PRESENCE NOT SPECIFIED: ICD-10-CM

## 2020-05-19 DIAGNOSIS — R68.89 FORGETFULNESS: ICD-10-CM

## 2020-05-21 RX ORDER — ASPIRIN 81 MG
TABLET, DELAYED RELEASE (ENTERIC COATED) ORAL
Qty: 100 TABLET | Refills: 3 | Status: SHIPPED | OUTPATIENT
Start: 2020-05-21 | End: 2021-04-19

## 2020-05-26 ENCOUNTER — MEDICAL CORRESPONDENCE (OUTPATIENT)
Dept: HEALTH INFORMATION MANAGEMENT | Facility: CLINIC | Age: 80
End: 2020-05-26

## 2020-05-26 ENCOUNTER — DOCUMENTATION ONLY (OUTPATIENT)
Dept: FAMILY MEDICINE | Facility: CLINIC | Age: 80
End: 2020-05-26

## 2020-05-26 NOTE — PROGRESS NOTES
To be completed in Nursing note:    Please reference list for forms that require a visit for completion.  Please remind patients that providers are given 3-5 business days to complete and return forms.      Form type:  [facility] / Physician Orders / Plan of Care      Date form received: 2020    Date form completed by Physician:  2020    How was form returned to patient (mailed, faxed, or at  for patient to ):  Fax to [phone]    Date form mailed/faxed/left at  for patient and sent to HIM for scannin2020           Once form is left for patient, faxed, or mailed PCS will then close the documentation only encounter.

## 2020-07-20 ENCOUNTER — MEDICAL CORRESPONDENCE (OUTPATIENT)
Dept: HEALTH INFORMATION MANAGEMENT | Facility: CLINIC | Age: 80
End: 2020-07-20

## 2020-07-20 ENCOUNTER — DOCUMENTATION ONLY (OUTPATIENT)
Dept: FAMILY MEDICINE | Facility: CLINIC | Age: 80
End: 2020-07-20

## 2020-07-20 NOTE — PROGRESS NOTES
To be completed in Nursing note:    Please reference list for forms that require a visit for completion.  Please remind patients that providers are given 3-5 business days to complete and return forms.      Form type:  [facility] / Plan of Care     Date form received:  2020      Date form completed by Physician:  2020      How was form returned to patient (mailed, faxed, or at  for patient to ):  Fax to [phone]    Date form mailed/faxed/left at  for patient and sent to HIM for scannin2020        Once form is left for patient, faxed, or mailed PCS will then close the documentation only encounter.

## 2020-08-05 ENCOUNTER — OFFICE VISIT (OUTPATIENT)
Dept: FAMILY MEDICINE | Facility: CLINIC | Age: 80
End: 2020-08-05
Payer: COMMERCIAL

## 2020-08-05 VITALS
WEIGHT: 141.2 LBS | RESPIRATION RATE: 16 BRPM | TEMPERATURE: 98.9 F | OXYGEN SATURATION: 96 % | HEART RATE: 62 BPM | SYSTOLIC BLOOD PRESSURE: 135 MMHG | DIASTOLIC BLOOD PRESSURE: 79 MMHG | BODY MASS INDEX: 26.68 KG/M2

## 2020-08-05 DIAGNOSIS — M15.9 OSTEOARTHRITIS OF MULTIPLE JOINTS, UNSPECIFIED OSTEOARTHRITIS TYPE: ICD-10-CM

## 2020-08-05 DIAGNOSIS — E03.9 ACQUIRED HYPOTHYROIDISM: ICD-10-CM

## 2020-08-05 DIAGNOSIS — D50.8 IRON DEFICIENCY ANEMIA SECONDARY TO INADEQUATE DIETARY IRON INTAKE: Primary | ICD-10-CM

## 2020-08-05 LAB
% GRANULOCYTES: 54.5 %G (ref 40–75)
GRANULOCYTES #: 2.9 K/UL (ref 1.6–8.3)
HCT VFR BLD AUTO: 40 % (ref 40–53)
HEMOGLOBIN: 11.8 G/DL (ref 13.3–17.7)
LYMPHOCYTES # BLD AUTO: 2 K/UL (ref 0.8–5.3)
LYMPHOCYTES NFR BLD AUTO: 38.1 %L (ref 20–48)
MCH RBC QN AUTO: 29.3 PG (ref 26.5–35)
MCHC RBC AUTO-ENTMCNC: 29.5 G/DL (ref 32–36)
MCV RBC AUTO: 99.2 FL (ref 78–100)
MID #: 0.4 K/UL (ref 0–2.2)
MID %: 7.4 %M (ref 0–20)
PLATELET # BLD AUTO: 230 K/UL (ref 150–450)
RBC # BLD AUTO: 4 M/UL (ref 4.4–5.9)
TSH SERPL DL<=0.05 MIU/L-ACNC: 1.27 UIU/ML (ref 0.3–5)
WBC # BLD AUTO: 5.3 K/UL (ref 4–11)

## 2020-08-05 RX ORDER — FERROUS SULFATE 325(65) MG
325 TABLET ORAL
Qty: 30 TABLET | Refills: 11 | Status: SHIPPED | OUTPATIENT
Start: 2020-08-05 | End: 2021-08-05

## 2020-08-05 RX ORDER — DULOXETIN HYDROCHLORIDE 30 MG/1
90 CAPSULE, DELAYED RELEASE ORAL DAILY
Qty: 90 CAPSULE | Refills: 11 | Status: SHIPPED | OUTPATIENT
Start: 2020-08-05 | End: 2021-04-19

## 2020-08-05 ASSESSMENT — PATIENT HEALTH QUESTIONNAIRE - PHQ9: SUM OF ALL RESPONSES TO PHQ QUESTIONS 1-9: 17

## 2020-08-05 NOTE — LETTER
August 12, 2020      Rafy Riggins  [address]  [facility] [address]        Dear Mr.Pokwal Riggins,    The result of your x-ray does not show any's concern for cancer or any new bone lesions.     Resulted Orders   TSH  Sensitive ([facility])   Result Value Ref Range    TSH 1.27 0.30 - 5.00 uIU/mL    Narrative    Test performed by:  [facility]  [address] [place] [address]   CBC with Diff Plt ([facility])   Result Value Ref Range    WBC 5.3 4.0 - 11.0 K/uL    Lymphocytes # 2.0 0.8 - 5.3 K/uL    % Lymphocytes 38.1 20.0 - 48.0 %L    Mid # 0.4 0.0 - 2.2 K/uL    Mid % 7.4 0.0 - 20.0 %M    GRANULOCYTES # 2.9 1.6 - 8.3 K/uL    % Granulocytes 54.5 40.0 - 75.0 %G    RBC 4.0 (L) 4.4 - 5.9 M/uL    Hemoglobin 11.8 (L) 13.3 - 17.7 g/dL    Hematocrit 40.0 40.0 - 53.0 %    MCV 99.2 78.0 - 100.0 fL    MCH 29.3 26.5 - 35.0    MCHC 29.5 (L) 32.0 - 36.0 g/dL    Platelets 230.0 150.0 - 450.0 K/uL       If you have any questions or concerns, please call the clinic at the number listed above.       Sincerely,      Francisco Reese MD

## 2020-08-05 NOTE — NURSING NOTE
Due to patient being non-English speaking/uses sign language, an  was used for this visit. Only for face-to-face interpretation by an external agency, date and length of interpretation can be found on the scanned worksheet.     name: Kecia    Agency: [employer] - telephone  Language: Hebrew   Telephone number: ID: [id number]  Type of interpretation: Telephone, spoken

## 2020-08-05 NOTE — PROGRESS NOTES
SUBJECTIVE       Rafy Riggins is a 80 year old  male with a PMH significant for:     Patient Active Problem List   Diagnosis     HTN (hypertension)     Back pain     Poor vision     S/P lumbar spinal fusion     Colon polyp     Diabetes mellitus, type 2 (H)     Obesity     Lumbosacral radiculopathy at S1     Gout of foot     Hypothyroidism     Patient presents with:  RECHECK: follow up on back pain, dizziness, weakness per patient.   Medication Reconciliation: reviewed.   other: have poor appetite and it's getting worse per patient's son.     Lumbar spine pain, chronic  MRI in Nov/2019 with multi-level osteoarthritis   Uses gabapentin, ibuprofen, and duloxetine.   He feels that the Duloxetine is helping. Started last year, currently at 60 mg  No side effects from any of the medications. No stomach pain or dark/bloody stools  He previously had a steroid injection when the pain was very severe  Increased sleepiness over 1 year, last TSH checked 4/19.  Son is concerned that his appetite has been decreasing over the last year and he is sleeping more than usual.   No fever, chills, cough, sob, weakness.     PMH, Medications and Allergies were reviewed and updated as needed.          OBJECTIVE     Vitals:    08/05/20 1301   BP: 135/79   BP Location: Right arm   Patient Position: Sitting   Cuff Size: Adult Regular   Pulse: 62   Resp: 16   Temp: 98.9  F (37.2  C)   TempSrc: Oral   SpO2: 96%   Weight: 64 kg (141 lb 3.2 oz)     Body mass index is 26.68 kg/m .    General :  healthy and alert, no distress  HEENT:  PERRLA, MMM, no JVD, no discharge, sclera anicteric, normal Conjunctiva   Cardiovascular: regular rate and rhythm, normal S1/S2 no other heart sounds  Respiratory:  CTA, normal respiratory effort  Musculoskeletal: No point tenderness to lumbar spine. All LE ROM produces pain. No overlying skin abnormalities. No bony abnormalities of the back palpated.   Skin:   no lesions or rashes on exposed  skin  Neurological:   no gross defects, moves all fours  Psychiatric:  appropriate mood and affect                      Hematological: normal cervical and supraclavicular lymph nodes  Gastrointestinal:       abdomen soft, non-tender,    No results found for this or any previous visit (from the past 24 hour(s)).    ASSESSMENT AND PLAN       Rafy was seen today for recheck, medication reconciliation and other.    Diagnoses and all orders for this visit:    Iron deficiency anemia secondary to inadequate dietary iron intake: Follow-up in 3 months  -     ferrous sulfate (FEROSUL) 325 (65 Fe) MG tablet; Take 1 tablet (325 mg) by mouth daily (with breakfast)  -     CBC with Diff Plt ([facility])    Osteoarthritis of multiple joints, unspecified osteoarthritis type: Consider increasing duloxetine in 4 weeks. Performing XR in order to r/o metastasis. He has a h/o colon polyps, due for colonoscopy, will discuss at next visit.   -     DULoxetine (CYMBALTA) 30 MG capsule; Take 3 capsules (90 mg) by mouth daily  -     XR Lumbar Spine 2-3 Views*    Acquired hypothyroidism  -     TSH  Sensitive ([facility])    Follow-up:  RTC in 4 weeks for follow up of chronic pain or sooner if develops new or worsening symptoms.  At that time, consider increasing Duloxetine, order colonoscopy, and discuss diabetes management.     Discussed with MD Francisco Ramirez MD PGY 3  [facility]

## 2020-08-05 NOTE — PROGRESS NOTES
Preceptor Attestation:  Patient seen and evaluated in person. I discussed the patient with the resident. I have verified the content of the note, which accurately reflects my assessment of the patient and the plan of care.  Supervising Physician:  Palak Hardy MD.

## 2020-08-05 NOTE — LETTER
August 12, 2020      Rafy Riggins  [address]  [facility] [address]        Dear Mr.Pokwal Riggins,    We are writing to inform you of your test results.      Your laboratory results have returned and are reassuring. Your hemoglobin is mildly low which has been so in the past. Your thyroid levels are normal. No medical changes at this time. Please call if you have questions.     Resulted Orders   TSH  Sensitive ([facility])   Result Value Ref Range    TSH 1.27 0.30 - 5.00 uIU/mL    Narrative    Test performed by:  [facility]  [address] [place] [address]   CBC with Diff Plt ([facility])   Result Value Ref Range    WBC 5.3 4.0 - 11.0 K/uL    Lymphocytes # 2.0 0.8 - 5.3 K/uL    % Lymphocytes 38.1 20.0 - 48.0 %L    Mid # 0.4 0.0 - 2.2 K/uL    Mid % 7.4 0.0 - 20.0 %M    GRANULOCYTES # 2.9 1.6 - 8.3 K/uL    % Granulocytes 54.5 40.0 - 75.0 %G    RBC 4.0 (L) 4.4 - 5.9 M/uL    Hemoglobin 11.8 (L) 13.3 - 17.7 g/dL    Hematocrit 40.0 40.0 - 53.0 %    MCV 99.2 78.0 - 100.0 fL    MCH 29.3 26.5 - 35.0    MCHC 29.5 (L) 32.0 - 36.0 g/dL    Platelets 230.0 150.0 - 450.0 K/uL       If you have any questions or concerns, please call the clinic at the number listed above.       Sincerely,        Francisco Reese MD

## 2020-08-07 DIAGNOSIS — N40.0 BENIGN PROSTATIC HYPERPLASIA, UNSPECIFIED WHETHER LOWER URINARY TRACT SYMPTOMS PRESENT: ICD-10-CM

## 2020-08-07 RX ORDER — TAMSULOSIN HYDROCHLORIDE 0.4 MG/1
0.4 CAPSULE ORAL DAILY
Qty: 90 CAPSULE | Refills: 3 | Status: SHIPPED | OUTPATIENT
Start: 2020-08-07 | End: 2021-04-19

## 2020-08-07 NOTE — TELEPHONE ENCOUNTER
Per pharmacy: The pt has just received the last refill for tamsulosin (FLOMAX) 0.4 MG capsule. This is an advance request to ensure uninterrupted availability of this prescription.

## 2020-09-23 ENCOUNTER — DOCUMENTATION ONLY (OUTPATIENT)
Dept: FAMILY MEDICINE | Facility: CLINIC | Age: 80
End: 2020-09-23

## 2020-09-23 ENCOUNTER — MEDICAL CORRESPONDENCE (OUTPATIENT)
Dept: HEALTH INFORMATION MANAGEMENT | Facility: CLINIC | Age: 80
End: 2020-09-23

## 2020-09-23 NOTE — PROGRESS NOTES
To be completed in Nursing note:    Please reference list for forms that require a visit for completion.  Please remind patients that providers are given 3-5 business days to complete and return forms.      Form type: Plan of Care from [facility]     Date form received: 9/21/2020    Date form completed by Physician: 9/23/2020    How was form returned to patient (mailed, faxed, or at  for patient to ):    Fax to [phone]    Date form mailed/faxed/left at  for patient and sent to HIM for scanning:    Fax on 9/23/2020    Once form is left for patient, faxed, or mailed PCS will then close the documentation only encounter.

## 2020-09-29 DIAGNOSIS — Z00.00 ROUTINE GENERAL MEDICAL EXAMINATION AT A HEALTH CARE FACILITY: ICD-10-CM

## 2020-09-29 NOTE — TELEPHONE ENCOUNTER
[facility] phone call message- general phone call:    Reason for call: They would like to know if you would be willing to flowing with home care via phone and fax    Return call needed: Yes    OK to leave a message on voice mail? Yes    Primary language: [facility]      needed? Yes    Call taken on June 1, 2017 at 11:59 AM by Karla Smith     Patient called asking about scheduling her surgery with David and Wyatt Spangler in October in [facility]  I called Steven Tanner at [facility] office to inquired and she is going to check on dates provided and let me know   Patient still needs to have a consult with Wyatt Spangler prior to scheduling her surgeru/

## 2020-11-09 ENCOUNTER — TELEPHONE (OUTPATIENT)
Dept: FAMILY MEDICINE | Facility: CLINIC | Age: 80
End: 2020-11-09

## 2020-11-09 NOTE — TELEPHONE ENCOUNTER
[facility] phone call message- general phone call:    Reason for call: They need to verify the Duloxetine dose    Action desired: call back.    Return call needed: Yes    OK to leave a message on voice mail? Yes    Advised patient to response may take up to 2 business days: Yes    Primary language: Croatian      needed? Yes    Call taken on November 9, 2020 at 11:16 AM by Karla Smith

## 2020-11-16 ENCOUNTER — TELEPHONE (OUTPATIENT)
Dept: FAMILY MEDICINE | Facility: CLINIC | Age: 80
End: 2020-11-16

## 2020-11-16 NOTE — TELEPHONE ENCOUNTER
[facility] phone call message- general phone call:    Reason for call: He has a fever he is vomiting they would like tot alk to a nurse.    Action desired: call back.    Return call needed: Yes    OK to leave a message on voice mail? Yes    Advised patient to response may take up to 2 business days: Yes    Primary language: Kiswahili      needed? Yes    Call taken on November 16, 2020 at 12:30 PM by Karla Smith

## 2020-11-17 NOTE — TELEPHONE ENCOUNTER
Son reports pt was seen in the ER and dx with pancreatitis and poss. covid patient was directed to follow up with PCP Son and  transferred to call center to schedule a virtual visit.   #[phone]    Note routed to Dr.Berg Elodia TOTH

## 2020-11-18 ENCOUNTER — DOCUMENTATION ONLY (OUTPATIENT)
Dept: FAMILY MEDICINE | Facility: CLINIC | Age: 80
End: 2020-11-18

## 2020-11-18 NOTE — PROGRESS NOTES
To be completed in Nursing note:    Please reference list for forms that require a visit for completion.  Please remind patients that providers are given 3-5 business days to complete and return forms.      Form type:  [facility] / Plan of Care     Date form received:  2020    Date form completed by Physician:  2020    How was form returned to patient (mailed, faxed, or at  for patient to ):  fax to [phone]    Date form mailed/faxed/left at  for patient and sent to HIM for [email or web address]      Once form is left for patient, faxed, or mailed PCS will then close the documentation only encounter.

## 2020-11-19 ENCOUNTER — TELEPHONE (OUTPATIENT)
Dept: FAMILY MEDICINE | Facility: CLINIC | Age: 80
End: 2020-11-19

## 2020-11-19 DIAGNOSIS — R06.6 HICCUPS: ICD-10-CM

## 2020-11-19 DIAGNOSIS — R06.6 HICCUPS: Primary | ICD-10-CM

## 2020-11-19 RX ORDER — BACLOFEN 10 MG/1
10 TABLET ORAL 3 TIMES DAILY PRN
Qty: 20 TABLET | Refills: 1 | Status: SHIPPED | OUTPATIENT
Start: 2020-11-19 | End: 2020-12-11

## 2020-11-19 RX ORDER — BACLOFEN 10 MG/1
10 TABLET ORAL 3 TIMES DAILY PRN
Qty: 20 TABLET | Refills: 1 | Status: SHIPPED | OUTPATIENT
Start: 2020-11-19 | End: 2020-11-19

## 2020-11-19 NOTE — TELEPHONE ENCOUNTER
[facility] phone call message- general phone call:    Reason for call: He needs a call back re hs father.    Action desired: call back.    Return call needed: Yes    OK to leave a message on voice mail? Yes    Advised patient to response may take up to 2 business days: Yes    Primary language: Kazakh      needed? Yes    Call taken on November 19, 2020 at 10:33 AM by Karla Smith

## 2020-11-19 NOTE — TELEPHONE ENCOUNTER
FYI - Rx sent to patient's pharmacy.  I called son's listed number; no answer. I left voice message.    Kristian Muhammad MD

## 2020-11-19 NOTE — TELEPHONE ENCOUNTER
Son reporting pt has hiccups for over one week now. Started when symptoms of covid started specifically n/v. Son does not report pain with the hiccups but patient is very uncomfortable and son is upset seeing his father in this condition. Son reports continuing n/v, states pt has had the same problem with the hiccups in the past when he was sick and they were relieved only when patient recovered from his illness Son does not want his father to suffer with the hiccups until he is better from his covid symptoms. Patient did test positive. Patient does have an appt 11/20 but would like any recommendations they could try now Son encouraged to seek emergency medical attention for pt is he develops SOB.pain or symptoms worsen or change.  ( #[id number] on call but son is fluent in English).    Note routed to Dr. Clifton RN

## 2020-11-20 ENCOUNTER — VIRTUAL VISIT (OUTPATIENT)
Dept: FAMILY MEDICINE | Facility: CLINIC | Age: 80
End: 2020-11-20
Payer: COMMERCIAL

## 2020-11-20 ENCOUNTER — MEDICAL CORRESPONDENCE (OUTPATIENT)
Dept: HEALTH INFORMATION MANAGEMENT | Facility: CLINIC | Age: 80
End: 2020-11-20

## 2020-11-20 VITALS — TEMPERATURE: 102 F

## 2020-11-20 DIAGNOSIS — U07.1 2019 NOVEL CORONAVIRUS DISEASE (COVID-19): Primary | ICD-10-CM

## 2020-11-20 DIAGNOSIS — I10 ESSENTIAL HYPERTENSION WITH GOAL BLOOD PRESSURE LESS THAN 140/90: ICD-10-CM

## 2020-11-20 DIAGNOSIS — E11.9 TYPE 2 DIABETES MELLITUS WITHOUT COMPLICATION, WITHOUT LONG-TERM CURRENT USE OF INSULIN (H): ICD-10-CM

## 2020-11-20 DIAGNOSIS — R06.6 HICCUPS: ICD-10-CM

## 2020-11-20 PROCEDURE — 99214 OFFICE O/P EST MOD 30 MIN: CPT | Mod: GT | Performed by: FAMILY MEDICINE

## 2020-11-20 RX ORDER — ONDANSETRON 8 MG/1
8 TABLET, FILM COATED ORAL EVERY 8 HOURS PRN
Qty: 20 TABLET | Refills: 1 | Status: SHIPPED | OUTPATIENT
Start: 2020-11-20 | End: 2020-11-30

## 2020-11-20 RX ORDER — METOCLOPRAMIDE 10 MG/1
10 TABLET ORAL EVERY 8 HOURS PRN
COMMUNITY
End: 2020-11-30

## 2020-11-20 RX ORDER — ACETAMINOPHEN 325 MG/1
325-650 TABLET ORAL EVERY 6 HOURS PRN
Qty: 100 TABLET | Refills: 0 | Status: SHIPPED | OUTPATIENT
Start: 2020-11-20 | End: 2021-04-19

## 2020-11-20 NOTE — PROGRESS NOTES
"Family Medicine Video Visit Note  Rafy is being evaluated via a billable video visit.             Video Visit Consent     Patient was verbally read the following and verbal consent was obtained.  \"Video visits are billed at different rates depending on your insurance coverage. During this emergency period, for some insurers they may be billed the same as an in-person visit.  Please reach out to your insurance provider with any questions.  If during the course of the call the physician/provider feels a video visit is not appropriate, you will not be charged for this service.\"     (Name person giving consent:  Patient and son   Date verbal consent given:  11/20/2020  Time verbal consent given:  11:10 AM)    Patient would like the video invitation sent by: Text to cell phone: [phone]          Due to patient being non-English speaking/uses sign language, an  was used for this visit. Only for face-to-face interpretation by an external agency, date and length of interpretation can be found on the scanned worksheet.     name: Freda Curran  Agency: [employer]  Language: Polish   Telephone number: [phone]  Type of interpretation: Telephone, spoken           HPI       Video Start Time: 11:30    Social Determinants of Health Screening  Food Insecurity:  Within the past 12 months, did you worry whether your food would run out before you would have money to buy more?  No    Within the past 12 months, did you find the food you bought just didn't last and/or you didn't have money to get more?  No        Rafy presents to clinic today for the following health issues:  COVID positive. Breathing \"OK\", main concern is intractable hiccups. Has had this before. Given Thorizine x1 in ED and sent home with Reglan.    Instructions for reglan was \"as needed for nausea\".  Has been administer reglan on 3-4 occasions. Seems to have little effect on the nausea and hiccups continue      Current Outpatient " Medications   Medication Sig Dispense Refill     allopurinol (ZYLOPRIM) 100 MG tablet Take 1 tablet (100 mg) by mouth daily 30 tablet 11     aspirin (ASA) 81 MG EC tablet Take 1 tablet (81 mg) by mouth daily 90 tablet 3     atorvastatin (LIPITOR) 40 MG tablet Take 1 tablet (40 mg) by mouth daily 90 tablet 3     baclofen (LIORESAL) 10 MG tablet Take 1 tablet (10 mg) by mouth 3 times daily as needed (Hiccups) 20 tablet 1     Blood Pressure Monitoring (ADULT BLOOD PRESSURE CUFF LG) KIT 1 Device daily 1 kit 0     cholestyramine (QUESTRAN) 4 GM/DOSE powder Take 1-4 g by mouth daily To keep stools formed and not loose 378 g 3     DULoxetine (CYMBALTA) 30 MG capsule Take 3 capsules (90 mg) by mouth daily 90 capsule 11     Elastic Bandages & Supports (ANKLE STIRRUP BRACE/LEFT) MISC Use as needed       famotidine (PEPCID) 40 MG tablet Take 1 tablet (40 mg) by mouth daily as needed for heartburn 30 tablet 11     ferrous sulfate (FEROSUL) 325 (65 Fe) MG tablet Take 1 tablet (325 mg) by mouth daily (with breakfast) 30 tablet 11     gabapentin (NEURONTIN) 100 MG capsule TAKE 1 CAPSULE (100 MG) BY MOUTH 3 TIMES DAILY 90 capsule 11     JANUVIA 50 MG tablet 2 times daily  2     levothyroxine (SYNTHROID/LEVOTHROID) 75 MCG tablet Take 1 tablet (75 mcg) by mouth daily 90 tablet 3     lisinopril (PRINIVIL/ZESTRIL) 20 MG tablet Take 1 tablet (20 mg) by mouth daily 90 tablet 3     metFORMIN (GLUCOPHAGE) 500 MG tablet TAKE 2 TABS (1,000MG) BY MOUTH IN THE MORNING WITH BREAKFAST, AND 1 TAB (500MG) BY MOUTH IN THE EVENING WITH DINNER. 90 tablet 11     multivitamin, therapeutic with minerals (THERA-VIT-M) TABS tablet TAKE 1 TABLET BY MOUTH DAILY 100 tablet 3     NON-ASPIRIN PAIN RELIEF 325 MG tablet TAKE 1-2 TABLETS (325-650 MG) BY MOUTH EVERY 6 HOURS AS NEEDED FOR MILD PAIN 100 tablet 3     ranitidine (ZANTAC) 150 MG tablet Take 1 tablet (150 mg) by mouth 2 times daily as needed for heartburn 120 tablet 3     tamsulosin (FLOMAX) 0.4 MG  "capsule Take 1 capsule (0.4 mg) by mouth daily 90 capsule 3     Allergies   Allergen Reactions     Nka [No Known Allergies]               Review of Systems:     Constitutional, HEENT, cardiovascular, pulmonary, gi and gu systems are negative, except as otherwise noted.         Physical Exam:     There were no vitals taken for this visit.  Estimated body mass index is 26.68 kg/m  as calculated from the following:    Height as of 9/27/19: 1.549 m (5' 1\").    Weight as of 8/5/20: 64 kg (141 lb 3.2 oz).    GENERAL: Healthy, alert and no distress  EYES: Eyes grossly normal to inspection.  No discharge or erythema, or obvious scleral/conjunctival abnormalities.  RESP: No audible wheeze, cough, or visible cyanosis.  No visible retractions or increased work of breathing.    SKIN: Visible skin clear. No significant rash, abnormal pigmentation or lesions.  NEURO: Cranial nerves grossly intact.  Mentation and speech appropriate for age.  PSYCH: Mentation appears normal, affect normal/bright, judgement and insight intact, normal speech and appearance well-groomed.                Assessment and Plan   1. 2019 novel coronavirus disease (COVID-19)    - acetaminophen (TYLENOL) 325 MG tablet; Take 1-2 tablets (325-650 mg) by mouth every 6 hours as needed for mild pain  Dispense: 100 tablet; Refill: 0  - ondansetron (ZOFRAN) 8 MG tablet; Take 1 tablet (8 mg) by mouth every 8 hours as needed for nausea  Dispense: 20 tablet; Refill: 1    2. Hiccups      3. Essential hypertension with goal blood pressure less than 140/90      4. Type 2 diabetes mellitus without complication, without long-term current use of insulin (H)    Plan  1) Baclofen 10 mg prescibed.  and take three times a day until hiccups stop  Stop Reglan.    2) Virtual Clinic Appointment Tuesday Nov 24th if hiccups not resolved.    3) Tylenol as needed for comfort    4) Zofran as needed for nausea         After Visit Information:  Patient declined AVS       No " follow-ups on file.      Video-Visit Details    Type of service:  Video Visit    Video End Time (time video stopped): 12:00 noon    Originating Location (pt. Location): Home    Distant Location (provider location):  [facility]     Platform used for Video Visit: [employer]      Kristian Muhammad MD

## 2020-11-21 NOTE — PATIENT INSTRUCTIONS
"COVID positive. Breathing \"OK\", main concern is intractable hiccups. Has had this before. Given Thorizine x1 in ED and sent home with Reglan.    Instructions for reglan was \"as needed for nausea\".  Has been administer reglan on 3-4 occasions. Seems to have little effect on the nausea and hiccups continue    1) Baclofen 10 mg prescibed.  and take three times a day until hiccups stop  Stop Reglan.    2) Virtual Clinic Appointment Tuesday Nov 24th if hiccups not resolved.    3) Tylenol as needed for comfort    4) Zofran as needed for nausea  "

## 2020-11-24 ENCOUNTER — TELEPHONE (OUTPATIENT)
Dept: FAMILY MEDICINE | Facility: CLINIC | Age: 80
End: 2020-11-24

## 2020-11-24 NOTE — TELEPHONE ENCOUNTER
[facility] phone call message- general phone call:    Reason for call: He would like to talk to a nurse re some symptoms he is having.    Action desired: call back.    Return call needed: Yes    OK to leave a message on voice mail? Yes    Advised patient to response may take up to 2 business days: Yes    Primary language: Divehi      needed? Yes    Call taken on November 24, 2020 at 2:42 PM by Karla Smith

## 2020-11-27 NOTE — TELEPHONE ENCOUNTER
Called patient with [facility] language line  and spoke with son who declined use of .    He reports his father was recently given a prescription for Baclofen to help with ongoing hiccups s/p COVID-19 diagnosis. This helped the hiccups, but family began noticing the patient seemed to be talking to himself and having vivid dreams at night. They were wondering if this was a side effect of the Baclofen so stopped giving it to him, and the symptoms somewhat improved. They are monitoring him 24 hours a day to ensure his safety. The hiccups have stopped.    Son also notes patient has been very weak and easily fatigued since COVID-19.     Recommended virtual visit to discuss possible medication side effect and care after COVID-19. Son is unable to be with his father today during the appointment so they are scheduled for Monday. Routed to Dr. Muhammad. ./ADRIANA

## 2020-11-30 ENCOUNTER — VIRTUAL VISIT (OUTPATIENT)
Dept: FAMILY MEDICINE | Facility: CLINIC | Age: 80
End: 2020-11-30
Payer: COMMERCIAL

## 2020-11-30 VITALS — BODY MASS INDEX: 25.01 KG/M2 | HEIGHT: 63 IN

## 2020-11-30 DIAGNOSIS — I10 ESSENTIAL HYPERTENSION WITH GOAL BLOOD PRESSURE LESS THAN 140/90: Primary | ICD-10-CM

## 2020-11-30 DIAGNOSIS — U07.1 2019 NOVEL CORONAVIRUS DISEASE (COVID-19): ICD-10-CM

## 2020-11-30 DIAGNOSIS — E11.9 TYPE 2 DIABETES MELLITUS WITHOUT COMPLICATION, WITHOUT LONG-TERM CURRENT USE OF INSULIN (H): ICD-10-CM

## 2020-11-30 DIAGNOSIS — R06.6 HICCUPS: ICD-10-CM

## 2020-11-30 PROCEDURE — 99213 OFFICE O/P EST LOW 20 MIN: CPT | Mod: 95 | Performed by: FAMILY MEDICINE

## 2020-11-30 ASSESSMENT — PATIENT HEALTH QUESTIONNAIRE - PHQ9: SUM OF ALL RESPONSES TO PHQ QUESTIONS 1-9: 20

## 2020-11-30 NOTE — PROGRESS NOTES
"Family Medicine Telephone Visit Note           Telephone Visit Consent   Patient was verbally read the following and verbal consent was obtained.    \"Telephone visits are billed at different rates depending on your insurance coverage. During this emergency period, for some insurers they may be billed the same as an in-person visit.  Please reach out to your insurance provider with any questions.  If during the course of the call the physician/provider feels a telephone visit is not appropriate, you will not be charged for this service.\"    Name person giving consent:  Patient and the son    Date verbal consent given:  11/30/2020  Time verbal consent given:  1:49 PM       Son interpreted         HPI   Patients name: Rafy  Appointment start time:  2:00pm    Social Determinants of Health Screening  Food Insecurity:  Within the past 12 months, did you worry whether your food would run out before you would have money to buy more?  No    Within the past 12 months, did you find the food you bought just didn't last and/or you didn't have money to get more?  No        Current Outpatient Medications   Medication Sig Dispense Refill     acetaminophen (TYLENOL) 325 MG tablet Take 1-2 tablets (325-650 mg) by mouth every 6 hours as needed for mild pain 100 tablet 0     allopurinol (ZYLOPRIM) 100 MG tablet Take 1 tablet (100 mg) by mouth daily 30 tablet 11     aspirin (ASA) 81 MG EC tablet Take 1 tablet (81 mg) by mouth daily 90 tablet 3     atorvastatin (LIPITOR) 40 MG tablet Take 1 tablet (40 mg) by mouth daily 90 tablet 3     baclofen (LIORESAL) 10 MG tablet Take 1 tablet (10 mg) by mouth 3 times daily as needed (Hiccups) 20 tablet 1     Blood Pressure Monitoring (ADULT BLOOD PRESSURE CUFF LG) KIT 1 Device daily 1 kit 0     cholestyramine (QUESTRAN) 4 GM/DOSE powder Take 1-4 g by mouth daily To keep stools formed and not loose 378 g 3     DULoxetine (CYMBALTA) 30 MG capsule Take 3 capsules (90 mg) by mouth daily 90 capsule 11 " "    Elastic Bandages & Supports (ANKLE STIRRUP BRACE/LEFT) MISC Use as needed       famotidine (PEPCID) 40 MG tablet Take 1 tablet (40 mg) by mouth daily as needed for heartburn 30 tablet 11     ferrous sulfate (FEROSUL) 325 (65 Fe) MG tablet Take 1 tablet (325 mg) by mouth daily (with breakfast) 30 tablet 11     gabapentin (NEURONTIN) 100 MG capsule TAKE 1 CAPSULE (100 MG) BY MOUTH 3 TIMES DAILY 90 capsule 11     JANUVIA 50 MG tablet 2 times daily  2     levothyroxine (SYNTHROID/LEVOTHROID) 75 MCG tablet Take 1 tablet (75 mcg) by mouth daily 90 tablet 3     lisinopril (PRINIVIL/ZESTRIL) 20 MG tablet Take 1 tablet (20 mg) by mouth daily 90 tablet 3     metFORMIN (GLUCOPHAGE) 500 MG tablet TAKE 2 TABS (1,000MG) BY MOUTH IN THE MORNING WITH BREAKFAST, AND 1 TAB (500MG) BY MOUTH IN THE EVENING WITH DINNER. 90 tablet 11     metoclopramide (REGLAN) 10 MG tablet Take 10 mg by mouth every 8 hours as needed       multivitamin, therapeutic with minerals (THERA-VIT-M) TABS tablet TAKE 1 TABLET BY MOUTH DAILY 100 tablet 3     NON-ASPIRIN PAIN RELIEF 325 MG tablet TAKE 1-2 TABLETS (325-650 MG) BY MOUTH EVERY 6 HOURS AS NEEDED FOR MILD PAIN 100 tablet 3     ondansetron (ZOFRAN) 8 MG tablet Take 1 tablet (8 mg) by mouth every 8 hours as needed for nausea 20 tablet 1     ranitidine (ZANTAC) 150 MG tablet Take 1 tablet (150 mg) by mouth 2 times daily as needed for heartburn 120 tablet 3     tamsulosin (FLOMAX) 0.4 MG capsule Take 1 capsule (0.4 mg) by mouth daily 90 capsule 3     Allergies   Allergen Reactions     Nka [No Known Allergies]               Review of Systems:     Constitutional, HEENT, cardiovascular, pulmonary, gi and gu systems are negative, except as otherwise noted.         Physical Exam:     There were no vitals taken for this visit.  Estimated body mass index is 26.68 kg/m  as calculated from the following:    Height as of 9/27/19: 1.549 m (5' 1\").    Weight as of 8/5/20: 64 kg (141 lb 3.2 " oz).    Exam:  Constitutional: healthy, alert and no distress  Psychiatric: mentation at baseline          Assessment and Plan   1. 2019 novel coronavirus disease (COVID-19)  Stable. Anticipate prolonged recovery. Well cared for at home by family.    2. Essential hypertension with goal blood pressure less than 140/90  Home BP cuff to monitor    3. Hiccups  Resolved    4. Type 2 diabetes mellitus without complication, without long-term current use of insulin (H)  Stable      After Visit Information:  Patient declined AVS     No follow-ups on file.    Appointment end time: 2:22pm  This is a telephone visit that took 22 minutes.      Clinician location:  [place]    Kristian Muhammad MD

## 2020-11-30 NOTE — PATIENT INSTRUCTIONS
Known to have COVID.  Telephone visit last week for intractable hiccups.  Still weak. Sleeping OK. Requires family to assist for any transfer (bed to bathroom to , etc)  Appetite remains poor. No longer needing Zofran. Eating some boiled rice; not much for protein.  Baclofen seem to stop hiccups, but Patient was noted to be talking to self and not fully oriented while taking. Baclofen was stopped and these issues have resolved.  Son wondering about BP cuff to use at home.    1) BP cuff to monitor home BP.  2) Activities as tolerated. Safety issues reviewed.  3) Continue Cholestyramine powder as needed for loose stools  4) Ensure one can daily    Recheck at phone visit in 2 weeks

## 2020-12-22 ENCOUNTER — DOCUMENTATION ONLY (OUTPATIENT)
Dept: FAMILY MEDICINE | Facility: CLINIC | Age: 80
End: 2020-12-22

## 2020-12-22 ENCOUNTER — MEDICAL CORRESPONDENCE (OUTPATIENT)
Dept: HEALTH INFORMATION MANAGEMENT | Facility: CLINIC | Age: 80
End: 2020-12-22

## 2020-12-22 NOTE — PROGRESS NOTES
To be completed in Nursing note:    Please reference list for forms that require a visit for completion.  Please remind patients that providers are given 3-5 business days to complete and return forms.      Form type:  [employer] : Prescription/Certificate of Medical Necessity     Date form received:  2020    Date form completed by Physician:  2020    How was form returned to patient (mailed, faxed, or at  for patient to ):  Fax to [phone]    Date form mailed/faxed/left at  for patient and sent to HIM for [email or web address]          Once form is left for patient, faxed, or mailed PCS will then close the documentation only encounter.

## 2020-12-24 ENCOUNTER — MEDICAL CORRESPONDENCE (OUTPATIENT)
Dept: HEALTH INFORMATION MANAGEMENT | Facility: CLINIC | Age: 80
End: 2020-12-24

## 2020-12-28 ENCOUNTER — DOCUMENTATION ONLY (OUTPATIENT)
Dept: FAMILY MEDICINE | Facility: CLINIC | Age: 80
End: 2020-12-28

## 2020-12-28 NOTE — PROGRESS NOTES
To be completed in Nursing note:    Please reference list for forms that require a visit for completion.  Please remind patients that providers are given 3-5 business days to complete and return forms.      Form type:  [employer] : Prescription / Certificate of Medical Necessity     Date form received:  2020    Date form completed by Physician:  2020    How was form returned to patient (mailed, faxed, or at  for patient to ):  Fax to [phone]    Date form mailed/faxed/left at  for patient and sent to HIM for [email or web address]        Once form is left for patient, faxed, or mailed PCS will then close the documentation only encounter.

## 2021-01-21 ENCOUNTER — VIRTUAL VISIT (OUTPATIENT)
Dept: FAMILY MEDICINE | Facility: CLINIC | Age: 81
End: 2021-01-21
Payer: COMMERCIAL

## 2021-01-21 DIAGNOSIS — I10 ESSENTIAL HYPERTENSION WITH GOAL BLOOD PRESSURE LESS THAN 140/90: Primary | ICD-10-CM

## 2021-01-21 DIAGNOSIS — G62.9 NEUROPATHY: ICD-10-CM

## 2021-01-21 PROCEDURE — 99214 OFFICE O/P EST MOD 30 MIN: CPT | Mod: TEL | Performed by: FAMILY MEDICINE

## 2021-01-21 RX ORDER — GABAPENTIN 100 MG/1
100 CAPSULE ORAL
Qty: 150 CAPSULE | Refills: 11 | Status: SHIPPED | OUTPATIENT
Start: 2021-01-21 | End: 2021-09-14

## 2021-01-21 ASSESSMENT — PATIENT HEALTH QUESTIONNAIRE - PHQ9: SUM OF ALL RESPONSES TO PHQ QUESTIONS 1-9: 14

## 2021-01-21 NOTE — PROGRESS NOTES
Rafy is a 81 year old who is being evaluated via a billable telephone visit.      What phone number would you like to be contacted at? [phone]  How would you like to obtain your AVS? Tiara  Declined    Assessment & Plan   1. Chronic low back pain: Hx of spinal fusion. Last injection was a year ago. Pt is not interested in another injection.  -Increase gabapentin   -F/u in 2 months    2. Generalized weakness: Pt continues to have generalized weakness since COVID-19 diagnosis in November. Plan is to continue exercises at home. If symptom does not improve, will consider PT/OT once the weather is better.   -Continue exercises at home    3. HTN: Pt not checking blood pressure at home. Will order blood pressure machine.      Subjective     Rafy is a 81 year old who presents to clinic today for the following health issues:     HPI   The patient reports that overall he is doing better since COVID-19 infection in November. However, he would like to discuss chronic low back pain, chronic dizziness and generalized weakness today. The patient reports that his back pain improved after his spinal fusion, but has since worsened. His last injection was a year ago and he had temporary relief at that time. The patient is not interested in another injection, but is curious about medications. No pain radiating down his legs. The patient has chronic right leg weakness (some right arm weakness) with numbness/tingling. Denies incontinence or saddle anesthesia. In terms of the dizziness, the patient notices this most when going from sitting to standing. Denies lightheadedness. Describes it as room-spinning. Ongoing since 2014, now maybe worse. Patient feels like his fluid intake is adequate. He does not check his blood pressure at home. Additionally, the patient continues to feel generally weak since COVID-19 diagnosis. Nausea, vomiting and hiccups have improved since then, however, he still has some shortness of breath and  generalized weakness.     In terms of mood, the patient and his son say that since his COVID-19 diagnosis, the patient has been more irritable. He feels like the Cymbalta has been helping with his mood though.    Review of Systems   Constitutional, HEENT, cardiovascular, pulmonary, gi and gu systems are negative, except as otherwise noted.      Objective           Vitals:  No vitals were obtained today due to virtual visit.    Physical Exam   alert and no distress  PSYCH: Alert and oriented times 3; coherent speech, normal   rate and volume, able to articulate logical thoughts, able   to abstract reason, no tangential thoughts, no hallucinations   or delusions  His affect is normal  RESP: No cough, no audible wheezing, able to talk in full sentences  Remainder of exam unable to be completed due to telephone visits    Glo Chin MS3  Preceptor Attestation:   I was present with the medical student who participated in the service and in the documentation of this note. I talked with the patient. I have verified the history and personally performed the physical exam and medical decision making. I have verified the content of the note, which accurately reflects my assessment of the patient and the plan of care.   Supervising Physician:  Kristian Muhammad MD.                       Phone call duration: 40 minutes

## 2021-01-22 ENCOUNTER — DOCUMENTATION ONLY (OUTPATIENT)
Dept: FAMILY MEDICINE | Facility: CLINIC | Age: 81
End: 2021-01-22

## 2021-01-28 ENCOUNTER — DOCUMENTATION ONLY (OUTPATIENT)
Dept: FAMILY MEDICINE | Facility: CLINIC | Age: 81
End: 2021-01-28

## 2021-01-28 ENCOUNTER — MEDICAL CORRESPONDENCE (OUTPATIENT)
Dept: HEALTH INFORMATION MANAGEMENT | Facility: CLINIC | Age: 81
End: 2021-01-28

## 2021-01-28 NOTE — PROGRESS NOTES
To be completed in Nursing note:    Please reference list for forms that require a visit for completion.  Please remind patients that providers are given 3-5 business days to complete and return forms.      Form type:  [facility] : Plan of Care     Date form received:  2021    Date form completed by Physician:  2021    How was form returned to patient (mailed, faxed, or at  for patient to ):  Fax to [phone]    Date form mailed/faxed/left at  for patient and sent to HIM for [email or web address]           Once form is left for patient, faxed, or mailed PCS will then close the documentation only encounter.

## 2021-02-03 ENCOUNTER — DOCUMENTATION ONLY (OUTPATIENT)
Dept: FAMILY MEDICINE | Facility: CLINIC | Age: 81
End: 2021-02-03

## 2021-02-03 NOTE — PROGRESS NOTES
To be completed in Nursing note:    Please reference list for forms that require a visit for completion.  Please remind patients that providers are given 3-5 business days to complete and return forms.      Form type:  [facility] : Plan of Care     Date form received:  2021    Date form completed by Physician:  2021    How was form returned to patient (mailed, faxed, or at  for patient to ):  fax to [phone]        Date form mailed/faxed/left at  for patient and sent to HIM for scannin2021            Once form is left for patient, faxed, or mailed PCS will then close the documentation only encounter.

## 2021-02-04 ENCOUNTER — MEDICAL CORRESPONDENCE (OUTPATIENT)
Dept: HEALTH INFORMATION MANAGEMENT | Facility: CLINIC | Age: 81
End: 2021-02-04

## 2021-02-04 ENCOUNTER — TRANSFERRED RECORDS (OUTPATIENT)
Dept: HEALTH INFORMATION MANAGEMENT | Facility: CLINIC | Age: 81
End: 2021-02-04

## 2021-02-20 ENCOUNTER — IMMUNIZATION (OUTPATIENT)
Dept: FAMILY MEDICINE | Facility: CLINIC | Age: 81
End: 2021-02-20
Payer: COMMERCIAL

## 2021-02-20 PROCEDURE — 0001A PR COVID VAC PFIZER DIL RECON 30 MCG/0.3 ML IM: CPT

## 2021-02-20 PROCEDURE — 91300 PR COVID VAC PFIZER DIL RECON 30 MCG/0.3 ML IM: CPT

## 2021-03-08 ENCOUNTER — TELEPHONE (OUTPATIENT)
Dept: FAMILY MEDICINE | Facility: CLINIC | Age: 81
End: 2021-03-08

## 2021-03-08 NOTE — TELEPHONE ENCOUNTER
[facility] phone call message- general phone call:    Reason for call: Pt is wondering if the doctor would prescribe a belt for his chronic back pain.    Return call needed: Yes    OK to leave a message on voice mail? Yes    Primary language: [facility]      needed? Yes    Call taken on March 8, 2021 at 2:36 PM by Wayne Salas

## 2021-03-09 DIAGNOSIS — M54.41 CHRONIC RIGHT-SIDED LOW BACK PAIN WITH RIGHT-SIDED SCIATICA: Primary | ICD-10-CM

## 2021-03-09 DIAGNOSIS — G89.29 CHRONIC RIGHT-SIDED LOW BACK PAIN WITH RIGHT-SIDED SCIATICA: Primary | ICD-10-CM

## 2021-03-12 DIAGNOSIS — M15.9 OSTEOARTHRITIS OF MULTIPLE JOINTS, UNSPECIFIED OSTEOARTHRITIS TYPE: ICD-10-CM

## 2021-03-12 RX ORDER — ACETAMINOPHEN 325 MG/1
TABLET ORAL
Qty: 100 TABLET | Refills: 3 | Status: SHIPPED | OUTPATIENT
Start: 2021-03-12 | End: 2023-06-29

## 2021-03-13 ENCOUNTER — IMMUNIZATION (OUTPATIENT)
Dept: FAMILY MEDICINE | Facility: CLINIC | Age: 81
End: 2021-03-13
Attending: FAMILY MEDICINE
Payer: COMMERCIAL

## 2021-03-13 PROCEDURE — 0002A PR COVID VAC PFIZER DIL RECON 30 MCG/0.3 ML IM: CPT

## 2021-03-13 PROCEDURE — 91300 PR COVID VAC PFIZER DIL RECON 30 MCG/0.3 ML IM: CPT

## 2021-03-15 ENCOUNTER — DOCUMENTATION ONLY (OUTPATIENT)
Dept: FAMILY MEDICINE | Facility: CLINIC | Age: 81
End: 2021-03-15

## 2021-03-15 NOTE — PROGRESS NOTES
To be completed in Nursing note:    Please reference list for forms that require a visit for completion.  Please remind patients that providers are given 3-5 business days to complete and return forms.      Form type:  [facility] : Physician Orders     Date form received:  2021    Date form completed by Physician:  2021    How was form returned to patient (mailed, faxed, or at  for patient to ):  fax to [phone]        Date form mailed/faxed/left at  for patient and sent to HIM for scannin2021        Once form is left for patient, faxed, or mailed PCS will then close the documentation only encounter.

## 2021-03-16 ENCOUNTER — MEDICAL CORRESPONDENCE (OUTPATIENT)
Dept: HEALTH INFORMATION MANAGEMENT | Facility: CLINIC | Age: 81
End: 2021-03-16

## 2021-03-23 ENCOUNTER — MEDICAL CORRESPONDENCE (OUTPATIENT)
Dept: HEALTH INFORMATION MANAGEMENT | Facility: CLINIC | Age: 81
End: 2021-03-23

## 2021-03-23 ENCOUNTER — DOCUMENTATION ONLY (OUTPATIENT)
Dept: FAMILY MEDICINE | Facility: CLINIC | Age: 81
End: 2021-03-23

## 2021-03-23 NOTE — PROGRESS NOTES
To be completed in Nursing note:    Please reference list for forms that require a visit for completion.  Please remind patients that providers are given 3-5 business days to complete and return forms.      Form type:  [facility] : Plan of Care     Date form received:  2021    Date form completed by Physician:  2021    How was form returned to patient (mailed, faxed, or at  for patient to ):  Fax to [phone]    Date form mailed/faxed/left at  for patient and sent to HIM for scannin2021            Once form is left for patient, faxed, or mailed PCS will then close the documentation only encounter.

## 2021-04-19 ENCOUNTER — OFFICE VISIT (OUTPATIENT)
Dept: FAMILY MEDICINE | Facility: CLINIC | Age: 81
End: 2021-04-19
Payer: COMMERCIAL

## 2021-04-19 VITALS
RESPIRATION RATE: 12 BRPM | HEART RATE: 62 BPM | OXYGEN SATURATION: 98 % | TEMPERATURE: 98.2 F | BODY MASS INDEX: 25.03 KG/M2 | DIASTOLIC BLOOD PRESSURE: 74 MMHG | SYSTOLIC BLOOD PRESSURE: 139 MMHG | WEIGHT: 136 LBS | HEIGHT: 62 IN

## 2021-04-19 DIAGNOSIS — Z00.00 ENCOUNTER FOR MEDICARE ANNUAL WELLNESS EXAM: ICD-10-CM

## 2021-04-19 DIAGNOSIS — E03.9 HYPOTHYROIDISM, UNSPECIFIED TYPE: ICD-10-CM

## 2021-04-19 DIAGNOSIS — E78.5 HYPERLIPIDEMIA LDL GOAL <100: ICD-10-CM

## 2021-04-19 DIAGNOSIS — M1A.9XX0 CHRONIC GOUT WITHOUT TOPHUS, UNSPECIFIED CAUSE, UNSPECIFIED SITE: ICD-10-CM

## 2021-04-19 DIAGNOSIS — E11.9 TYPE 2 DIABETES MELLITUS WITHOUT COMPLICATION, WITHOUT LONG-TERM CURRENT USE OF INSULIN (H): ICD-10-CM

## 2021-04-19 DIAGNOSIS — Z86.73 HISTORY OF CVA (CEREBROVASCULAR ACCIDENT): ICD-10-CM

## 2021-04-19 DIAGNOSIS — M54.41 CHRONIC RIGHT-SIDED LOW BACK PAIN WITH RIGHT-SIDED SCIATICA: ICD-10-CM

## 2021-04-19 DIAGNOSIS — I10 ESSENTIAL HYPERTENSION WITH GOAL BLOOD PRESSURE LESS THAN 140/90: ICD-10-CM

## 2021-04-19 DIAGNOSIS — R68.89 FORGETFULNESS: ICD-10-CM

## 2021-04-19 DIAGNOSIS — E03.9 ACQUIRED HYPOTHYROIDISM: ICD-10-CM

## 2021-04-19 DIAGNOSIS — M15.9 OSTEOARTHRITIS OF MULTIPLE JOINTS, UNSPECIFIED OSTEOARTHRITIS TYPE: Primary | ICD-10-CM

## 2021-04-19 DIAGNOSIS — N40.0 BENIGN PROSTATIC HYPERPLASIA, UNSPECIFIED WHETHER LOWER URINARY TRACT SYMPTOMS PRESENT: ICD-10-CM

## 2021-04-19 DIAGNOSIS — G89.29 CHRONIC RIGHT-SIDED LOW BACK PAIN WITH RIGHT-SIDED SCIATICA: ICD-10-CM

## 2021-04-19 DIAGNOSIS — U07.1 2019 NOVEL CORONAVIRUS DISEASE (COVID-19): ICD-10-CM

## 2021-04-19 LAB
% IMMATURE GRANULOCYTES: 0 % (ref 0–0)
ABS IMMATURE GRANULOCYTES: 0 10E9/L (ref 0–0)
BASOPHILS # BLD AUTO: 0 10E9/L (ref 0–0.2)
BASOPHILS NFR BLD AUTO: 0 % (ref 0–2)
BUN SERPL-MCNC: 14.9 MG/DL (ref 7–21)
CALCIUM SERPL-MCNC: 9.8 MG/DL (ref 8.5–10.1)
CHLORIDE SERPLBLD-SCNC: 104.7 MMOL/L (ref 98–110)
CHOLEST SERPL-MCNC: 104.2 MG/DL (ref 0–200)
CHOLEST/HDLC SERPL: 2.3 {RATIO} (ref 0–5)
CO2 SERPL-SCNC: 26.3 MMOL/L (ref 20–32)
CREAT SERPL-MCNC: 1.2 MG/DL (ref 0.7–1.3)
CREAT UR-MCNC: 68.1 MG/DL
EOSINOPHIL # BLD AUTO: 0.2 10E9/L (ref 0–0.7)
EOSINOPHIL NFR BLD AUTO: 4 % (ref 0–6)
ERYTHROCYTE [DISTWIDTH] IN BLOOD BY AUTOMATED COUNT: 14 % (ref 10–15)
GFR SERPL CREATININE-BSD FRML MDRD: 63.5 ML/MIN/1.7 M2
GLUCOSE SERPL-MCNC: 109.2 MG'DL (ref 70–99)
HBA1C MFR BLD: 5.9 % (ref 4.1–5.7)
HCT VFR BLD AUTO: 37.2 % (ref 40–53)
HDLC SERPL-MCNC: 45.4 MG/DL
HEMOGLOBIN: 11.7 G/DL (ref 13.3–17.7)
LDLC SERPL CALC-MCNC: 48 MG/DL (ref 0–129)
LYMPHOCYTES # BLD AUTO: 1.8 10E9/L (ref 0.8–5.3)
LYMPHOCYTES NFR BLD AUTO: 40 % (ref 20–48)
MCH RBC QN AUTO: 29.5 PG (ref 26.5–35)
MCHC RBC AUTO-ENTMCNC: 31.5 G/DL (ref 32–36)
MCV RBC AUTO: 93.7 FL (ref 78–100)
MICROALBUMIN UR-MCNC: <0.5 MG/DL (ref 0–1.99)
MICROALBUMIN/CREAT UR: NORMAL MG/G{CREAT}
MONOCYTES # BLD AUTO: 0.5 10E9/L (ref 0–1.3)
MONOCYTES NFR BLD AUTO: 10 % (ref 0–12)
NEUTROPHILS # BLD AUTO: 2 10E9/L (ref 1.6–8.3)
NEUTROPHILS NFR BLD AUTO: 45.6 % (ref 40–75)
PLATELET # BLD AUTO: 187 10E9/L (ref 150–450)
POTASSIUM SERPL-SCNC: 5.3 MMOL/L (ref 3.2–4.6)
RBC # BLD AUTO: 4 10E12/L (ref 4.4–5.9)
SODIUM SERPL-SCNC: 138.2 MMOL/L (ref 132–142)
TRIGL SERPL-MCNC: 52.4 MG/DL (ref 0–150)
TSH SERPL DL<=0.05 MIU/L-ACNC: 0.33 UIU/ML (ref 0.3–5)
VLDL CHOLESTEROL: 10.5 MG/DL (ref 7–32)
WBC # BLD AUTO: 4.4 10E9/L (ref 4–11)

## 2021-04-19 PROCEDURE — 83036 HEMOGLOBIN GLYCOSYLATED A1C: CPT | Performed by: FAMILY MEDICINE

## 2021-04-19 PROCEDURE — 80061 LIPID PANEL: CPT | Performed by: FAMILY MEDICINE

## 2021-04-19 PROCEDURE — 99397 PER PM REEVAL EST PAT 65+ YR: CPT | Mod: 25 | Performed by: FAMILY MEDICINE

## 2021-04-19 PROCEDURE — 90662 IIV NO PRSV INCREASED AG IM: CPT | Performed by: FAMILY MEDICINE

## 2021-04-19 PROCEDURE — 36415 COLL VENOUS BLD VENIPUNCTURE: CPT | Performed by: FAMILY MEDICINE

## 2021-04-19 PROCEDURE — 80048 BASIC METABOLIC PNL TOTAL CA: CPT | Performed by: FAMILY MEDICINE

## 2021-04-19 PROCEDURE — 85025 COMPLETE CBC W/AUTO DIFF WBC: CPT | Performed by: FAMILY MEDICINE

## 2021-04-19 PROCEDURE — 90471 IMMUNIZATION ADMIN: CPT | Performed by: FAMILY MEDICINE

## 2021-04-19 RX ORDER — TAMSULOSIN HYDROCHLORIDE 0.4 MG/1
0.4 CAPSULE ORAL DAILY
Qty: 90 CAPSULE | Refills: 3 | Status: SHIPPED | OUTPATIENT
Start: 2021-04-19 | End: 2022-03-31

## 2021-04-19 RX ORDER — LISINOPRIL 20 MG/1
20 TABLET ORAL DAILY
Qty: 90 TABLET | Refills: 3 | Status: SHIPPED | OUTPATIENT
Start: 2021-04-19 | End: 2022-03-31

## 2021-04-19 RX ORDER — ALLOPURINOL 100 MG/1
100 TABLET ORAL DAILY
Qty: 90 TABLET | Refills: 3 | Status: SHIPPED | OUTPATIENT
Start: 2021-04-19 | End: 2022-03-31

## 2021-04-19 RX ORDER — ASPIRIN 81 MG
1 TABLET, DELAYED RELEASE (ENTERIC COATED) ORAL DAILY
Qty: 100 TABLET | Refills: 3 | Status: SHIPPED | OUTPATIENT
Start: 2021-04-19 | End: 2022-03-31

## 2021-04-19 RX ORDER — LEVOTHYROXINE SODIUM 75 UG/1
75 TABLET ORAL DAILY
Qty: 90 TABLET | Refills: 3 | Status: SHIPPED | OUTPATIENT
Start: 2021-04-19 | End: 2022-03-31

## 2021-04-19 RX ORDER — DULOXETIN HYDROCHLORIDE 30 MG/1
90 CAPSULE, DELAYED RELEASE ORAL DAILY
Qty: 90 CAPSULE | Refills: 11 | Status: SHIPPED | OUTPATIENT
Start: 2021-04-19 | End: 2022-03-31

## 2021-04-19 RX ORDER — ATORVASTATIN CALCIUM 40 MG/1
40 TABLET, FILM COATED ORAL DAILY
Qty: 90 TABLET | Refills: 3 | Status: SHIPPED | OUTPATIENT
Start: 2021-04-19 | End: 2022-03-31

## 2021-04-19 RX ORDER — GABAPENTIN 300 MG/1
300 CAPSULE ORAL 2 TIMES DAILY PRN
Qty: 60 CAPSULE | Refills: 11 | Status: SHIPPED | OUTPATIENT
Start: 2021-04-19 | End: 2021-09-14

## 2021-04-19 RX ORDER — ACETAMINOPHEN 325 MG/1
325-650 TABLET ORAL EVERY 6 HOURS PRN
Qty: 100 TABLET | Refills: 0 | Status: SHIPPED | OUTPATIENT
Start: 2021-04-19 | End: 2021-05-24

## 2021-04-19 ASSESSMENT — MIFFLIN-ST. JEOR: SCORE: 1196.14

## 2021-04-19 NOTE — PROGRESS NOTES
"SUBJECTIVE:   Rafy Riggins is a 81 year old male who presents for Preventive Visit.      Patient has been advised of split billing requirements and indicates understanding: Yes  Are you in the first 12 months of your Medicare Part B coverage?  No    Physical Health:    In general, how would you rate your overall physical health? fair    Outside of work, how many days during the week do you exercise? 2-3 days/week    Outside of work, approximately how many minutes a day do you exercise?less than 15 minutes    If you drink alcohol do you typically have >3 drinks per day or >7 drinks per week? No    Do you usually eat at least 4 servings of fruit and vegetables a day, include whole grains & fiber and avoid regularly eating high fat or \"junk\" foods? Yes    Do you have any problems taking medications regularly?  No    Do you have any side effects from medications? none    Needs assistance for the following daily activities: transportation, shopping, preparing meals, housework, bathing, laundry, money management and taking medicine    Which of the following safety concerns are present in your home?  none identified     Hearing impairment: No    In the past 6 months, have you been bothered by leaking of urine? yes    Mental Health:    In general, how would you rate your overall mental or emotional health? fair  PHQ-2 Score:      Do you feel safe in your environment? Yes    Have you ever done Advance Care Planning? (For example, a Health Directive, POLST, or a discussion with a medical provider or your loved ones about your wishes): No, advance care planning information given to patient to review.  Patient plans to discuss their wishes with loved ones or provider.      Additional concerns to address?  No    Fall risk:     click delete button to remove this line now  Cognitive Screenin) see nursing note    Mini-CogTM Copyright RAE Iniguez. Licensed by the author for use in [facility]; reprinted with " "permission ([email or web address].[facility]). All rights reserved.      Do you have sleep apnea, excessive snoring or daytime drowsiness?: no      Reviewed and updated as needed this visit by clinical staff  Tobacco  Allergies  Meds              Reviewed and updated as needed this visit by Provider                Social History     Tobacco Use     Smoking status: Never Smoker     Smokeless tobacco: Never Used   Substance Use Topics     Alcohol use: Never     Frequency: Never                           Current providers sharing in care for this patient include:   Patient Care Team:  Kristian Muhammad MD as PCP - General (Family Practice)  Swapnil Muller PA-C (Orthopaedic Surgery)  [facility]    Kristian Muhammad MD as Assigned PCP    The following health maintenance items are reviewed in Epic and correct as of today:  Health Maintenance   Topic Date Due     ADVANCE CARE PLANNING  Never done     EYE EXAM  Never done     ZOSTER IMMUNIZATION (1 of 2) Never done     DIABETIC FOOT EXAM  03/15/2019     MEDICARE ANNUAL WELLNESS VISIT  04/08/2020     LIPID  04/08/2020     MICROALBUMIN  04/08/2020     FALL RISK ASSESSMENT  04/08/2020     COLORECTAL CANCER SCREENING  07/06/2020     A1C  08/21/2020     INFLUENZA VACCINE (1) 09/01/2020     BMP  02/21/2021     PHQ-9  01/21/2022     DTAP/TDAP/TD IMMUNIZATION (4 - Td) 11/11/2024     Pneumococcal Vaccine: Pediatrics (0 to 5 Years) and At-Risk Patients (6 to 64 Years)  Completed     Pneumococcal Vaccine: 65+ Years  Completed     COVID-19 Vaccine  Completed     IPV IMMUNIZATION  Aged Out     MENINGITIS IMMUNIZATION  Aged Out     Labs reviewed in EPIC  ROS:  Constitutional, HEENT, cardiovascular, pulmonary, gi and gu systems are negative, except as otherwise noted.    OBJECTIVE:   /74 (BP Location: Right arm, Patient Position: Sitting, Cuff Size: Adult Regular)   Pulse 62   Temp 98.2  F (36.8  C) (Oral)   Resp 12   Ht 1.575 m (5' 2\")   Wt 61.7 kg (136 lb)   SpO2 " "98%   BMI 24.87 kg/m   Estimated body mass index is 24.87 kg/m  as calculated from the following:    Height as of this encounter: 1.575 m (5' 2\").    Weight as of this encounter: 61.7 kg (136 lb).  EXAM:   GENERAL: healthy, alert and no distress  NECK: no adenopathy, no asymmetry, masses, or scars and thyroid normal to palpation  RESP: lungs clear to auscultation - no rales, rhonchi or wheezes  CV: regular rate and rhythm, normal S1 S2, no S3 or S4, no murmur, click or rub, no peripheral edema and peripheral pulses strong  ABDOMEN: soft, nontender, no hepatosplenomegaly, no masses and bowel sounds normal  MS: no gross musculoskeletal defects noted, no edema    Diagnostic Test Results:  Labs reviewed in Epic    ASSESSMENT / PLAN:       ICD-10-CM    1. Osteoarthritis of multiple joints, unspecified osteoarthritis type  M15.9 acetaminophen (TYLENOL) 325 MG tablet     CBC with Diff Plt ([facility])     DULoxetine (CYMBALTA) 30 MG capsule   2. Essential hypertension with goal blood pressure less than 140/90  I10 lisinopril (ZESTRIL) 20 MG tablet   3. Hyperlipidemia LDL goal <100  E78.5 atorvastatin (LIPITOR) 40 MG tablet   4. Chronic gout without tophus, unspecified cause, unspecified site  M1A.9XX0 allopurinol (ZYLOPRIM) 100 MG tablet   5. Type 2 diabetes mellitus without complication, without long-term current use of insulin (H)  E11.9 Basic Metabolic Panel ([facility])     Lipid Panel ([facility])     Hemoglobin A1c ([facility])     Microalbumin Creatinine Ratio Random Ur ([facility])     metFORMIN (GLUCOPHAGE) 500 MG tablet     EYE ADULT REFERRAL   6. Encounter for Medicare annual wellness exam  Z00.00 FLUZONE HIGH DOSE 65+  [66850]     Basic Metabolic Panel ([facility])     Lipid Panel ([facility])     Hemoglobin A1c ([facility])   7. Acquired hypothyroidism  E03.9 TSH  Sensitive ([facility])   8. History of CVA (cerebrovascular accident)  Z86.73    9. Chronic right-sided low back pain with right-sided sciatica  M54.41 " "Neck/Shoulder/Back/Abdomen Order for DME - ONLY FOR DME    G89.29 gabapentin (NEURONTIN) 300 MG capsule   10. 2019 novel coronavirus disease (COVID-19)  U07.1 acetaminophen (TYLENOL) 325 MG tablet   11. Benign prostatic hyperplasia, unspecified whether lower urinary tract symptoms present  N40.0 tamsulosin (FLOMAX) 0.4 MG capsule   12. Forgetfulness  R68.89 multivitamin, therapeutic with minerals (THERA-VIT-M) TABS tablet   13. Hypothyroidism, unspecified type  E03.9 levothyroxine (SYNTHROID/LEVOTHROID) 75 MCG tablet       Patient has been advised of split billing requirements and indicates understanding: Yes    COUNSELING:Declined    Estimated body mass index is 24.87 kg/m  as calculated from the following:    Height as of this encounter: 1.575 m (5' 2\").    Weight as of this encounter: 61.7 kg (136 lb).        He reports that he has never smoked. He has never used smokeless tobacco.    Appropriate preventive services were discussed with this patient, including applicable screening as appropriate for cardiovascular disease, diabetes, osteopenia/osteoporosis, and glaucoma.  As appropriate for age/gender, discussed screening for colorectal cancer, prostate cancer, breast cancer, and cervical cancer. Checklist reviewing preventive services available has been given to the patient.    Reviewed patients plan of care and provided an AVS. The Basic Care Plan (routine screening as documented in Health Maintenance) for Rafy meets the Care Plan requirement. This Care Plan has been established and reviewed with the Patient and son.    Counseling Resources:  ATP IV Guidelines  Pooled Cohorts Equation Calculator  Breast Cancer Risk Calculator  BRCA-Related Cancer Risk Assessment: FHS-7 Tool  FRAX Risk Assessment  ICSI Preventive Guidelines  Dietary Guidelines for Americans, 2010  USDA's MyPlate  ASA Prophylaxis  Lung CA Screening    Kristian Muhammad MD  [facility]  "

## 2021-04-19 NOTE — NURSING NOTE
Medicare Wellness Visit  Health Risk Assessment        Visual Acuity:  Right Eye: 20/50   Left Eye: 100/20  Both Eyes: 20/40        FALL RISK ASSESSMENT 4/8/2019 4/5/2018 3/15/2018 8/2/2017   Fallen 2 or more times in the past year? No No No No   Any fall with injury in the past year? No No No No            Health Risk Assessment / Review of Systems     Constitutional: Any fevers or night sweats? No     Eyes:  Vision problems    YES     Hearing Do you feel you have hearing loss?  No     Cardiovascular: Any chest pain, fast or irregular heart beat, calf pain with walking?     No           Respiratory:   Any breathing problems or cough?   No     Gastrointestinal: Any stomach or stool problems?   No      Genitourinary: Do you have difficulty controlling urination?    YES     Muscles and Joints: Any joint stiffness or soreness?    YES Back pain    Skin: Any concerning lesions or moles?   No     Nervous System: Any loss of strength or feeling, numbness or tingling, shaking, dizziness, or headache?   YES Dizziness, occassional headhaches and loss of strength.    Mental Health: Any depression, anxiety or problems sleeping?    No     Cognition: Do you have any problems with your memory?   YES Just confusion.    PHQ-2 Score:   PHQ-2 ( 1999 [employer]) 11/20/2020 12/5/2019   Q1: Little interest or pleasure in doing things 1 0   Q2: Feeling down, depressed or hopeless 1 0   PHQ-2 Score 2 0       PHQ-9 Score:   [facility] Follow-up to PHQ 8/5/2020 11/30/2020 1/21/2021   PHQ-9 9. Suicide Ideation past 2 weeks Not at all Not at all Not at all            Medical Care     What other specialists or organizations are involved in your medical care?  Seeing no one else at this time.  Patient Care Team       Relationship Specialty Notifications Start End    Kristian Muhammad MD PCP - General Family Practice  11/4/19     Phone: [phone] Fax: [phone]         [address] [place] [address]    Soraya Hudson    12/8/15     [facility]      Phone: [phone]         Swapnil Inman PA-C  Orthopaedic Surgery  8/13/18     Back pain, epidural spinal injections    Phone: [phone] Fax: [phone]         [facility] [address] [place] [address]    [facility]      2/21/20     Kay Lisa; KARSON 2/21/2020    Phone: [phone]         Kristian Muhammad MD Assigned PCP   1/31/21     Phone: [phone] Fax: [phone]         [address] [place] [address]                 Social History / Home Safety     Social History     Tobacco Use     Smoking status: Never Smoker     Smokeless tobacco: Never Used   Substance Use Topics     Alcohol use: Never     Frequency: Never     Marital Status:  Who lives in your household? 3 family members other then wife.    Does your home have any of the following safety concerns? Loose rugs in the hallway, no grab bars in the bathroom, no handrails on the stairs or have poorly lit areas?  No     Do you feel threatened or controlled by a partner, ex-partner or anyone in your life? No     Has anyone hurt you physically, for example by pushing, hitting, slapping or kicking you   or forcing you to have sex? No          Functional Status     Do you need help with dressing yourself, bathing, or walking? YES with all tasks assistance is needed    Do you need help with the phone, transportation, shopping, preparing meals, housework, laundry, medications or managing money? YES Help is needed with all tasks      Risk Behaviors and Healthy Habits     History   Smoking Status     Never Smoker   Smokeless Tobacco     Never Used     How many servings of fruits and vegetables do you eat a day? At least a serving a day    Exercise: 1 day/week for an average of less than 15 minutes walking      Do you frequently drive without a seatbelt?  no    Do you use any other drugs? No         Do you use alcohol?No      Frailty Assessment            1. By yourself and note using aids, do you have difficulty  "walking up 10 steps without resting?   YES Patient is in a wheelchair (1 for Yes, 0 for No)    2. By yourself and not using mobility aids, do you have any difficulty walking several hundred yards?  YES  (1 for Yes, 0 for No)    3. Have you lost 10 or more pounds unintentionally in the previous year? No  (If \"Yes\" and >5% weight loss, then score 1.  Score 0, if <5% weight loss or \"No\" weight loss)    4. How much of the times during the past 3 weeks did you feel tired? 2. Most of the time (\"1\" or \"2\" are scored 1, others 0)    5.  A doctor told the patient they had the following illnesses:  High blood pressure  Stroke  Diabetes  Arthritis (0-4 = score 0, 5-11= score 1)    MEDICARE PERSONAL PREVENTIVE SERVICES PLAN - IMMUNIZATIONS     Here are your recommended immunizations.  Take this home for your reference.                                                    IMMUNIZATIONS Description Recommend today?     Influenza (Flu shot) Prevents flu; should get every year Recommeded and patient declined.    PCV 13 Pneumonia vaccination; you get it once No; is up to date.   PPSV 23 Second pneumonia vaccination; usually get it 1 year after PCV 13 No; is up to date.   Zoster (Shingles) Prevents shingles; you get it once  (Check with Part D insurance for coverage, must receive at a pharmacy, not clinic) Declined   Tetanus Prevents tetanus; once every 10 years Recommeded and patient declined.            Chidi Parekh    "

## 2021-04-19 NOTE — PATIENT INSTRUCTIONS
Patient Education   Personalized Prevention Plan  You are due for the preventive services outlined below.  Your care team is available to assist you in scheduling these services.  If you have already completed any of these items, please share that information with your care team to update in your medical record.  Health Maintenance Due   Topic Date Due     Eye Exam  Never done     Zoster (Shingles) Vaccine (1 of 2) Never done     Diabetic Foot Exam  03/15/2019     Cholesterol Lab  04/08/2020     Kidney Microalbumin Urine Test  04/08/2020     FALL RISK ASSESSMENT  04/08/2020     Colorectal Cancer Screening  07/06/2020     A1C Lab  08/21/2020     Flu Vaccine (1) 09/01/2020     Basic Metabolic Panel  02/21/2021 04/21/21   EYE REFERRAL     [facility]  Phone:[phone]  Fax:  [phone]    Faxed demographics and referral to [phone]. They will contact patient to schedule.     Jil Caldwell

## 2021-04-19 NOTE — LETTER
"April 30, 2021      Rafy Riggins  [address]  [facility] [address]        Dear Mr.Pokwal Riggins,    We are writing to inform you of your test results.    Your blood tests results have returned. Good news. No significant new findings. Continue your present medications.     Resulted Orders   Basic Metabolic Panel ([facility])   Result Value Ref Range    Urea Nitrogen 14.9 7.0 - 21.0 mg/dL    Calcium 9.8 8.5 - 10.1 mg/dL    Chloride 104.7 98.0 - 110.0 mmol/L    Carbon Dioxide 26.3 20.0 - 32.0 mmol/L    Creatinine 1.2 0.7 - 1.3 mg/dL    Glucose 109.2 (H) 70.0 - 99.0 mg'dL    Potassium 5.3 (H) 3.2 - 4.6 mmol/L    Sodium 138.2 132.0 - 142.0 mmol/L    GFR Estimate 63.5 >60.0 mL/min/1.7 m2      Comment:      eGFR is calculated by the CKD-EPI creatinine equation, without race   adjustment. eGFR can be influenced by muscle mass, exercise, and diet. The   reported eGFR is an estimation only and is only applicable if the renal   function is stable.     Lipid Panel ([facility])   Result Value Ref Range    Cholesterol 104.2 0.0 - 200.0 mg/dL    Cholesterol/HDL Ratio 2.3 0.0 - 5.0    HDL Cholesterol 45.4 >40.0 mg/dL    LDL Cholesterol Calculated 48 0 - 129 mg/dL    Triglycerides 52.4 0.0 - 150.0 mg/dL    VLDL Cholesterol 10.5 7.0 - 32.0 mg/dL   Hemoglobin A1c ([facility])   Result Value Ref Range    Hemoglobin A1C 5.9 (H) 4.1 - 5.7 %   Microalbumin Creatinine Ratio Random Ur ([facility])   Result Value Ref Range    Microalbumin, Urine <0.50 0.00 - 1.99 mg/dL    Creatinine, Urine 68.1 mg/dL    Albumin Urine mg/g Cr See Note.       Comment:      \"Unable to calculate: Creatinine and/or Microalbumin value below detectable   level\"      Narrative    Test performed by:  [facility]  [address] [place] [address]  Microalbumin, Random Urine  <2.0 mg/dL . . . . . . . . Normal  3.0-30.0 mg/dL . . . . . . Microalbuminuria  >30.0 mg/dL . . . . . .  . Clinical Proteinuria  Microalbumin/Creatinine " Ratio, Random Urine  <20 mg/g . . . . .. . . . Normal   mg/g . . . . . . . Microalbuminuria  >300 mg/g . . . . . . . . Clinical Proteinuria   TSH  Sensitive ([facility])   Result Value Ref Range    TSH 0.33 0.30 - 5.00 uIU/mL    Narrative    Test performed by:  [facility]  [address] [place] [address]   CBC with Diff Plt ([facility])   Result Value Ref Range    WBC 4.4 4.0 - 11.0 10e9/L    RBC 4.0 (L) 4.4 - 5.9 10e12/L    Hemoglobin 11.7 (L) 13.3 - 17.7 g/dL    Hematocrit 37.2 (L) 40.0 - 53.0 %    MCV 93.7 78.0 - 100.0 fL    MCH 29.5 26.5 - 35.0 pg    MCHC 31.5 (L) 32.0 - 36.0 g/dL    RDW 14 10 - 15 %    Platelets 187.0 150.0 - 450.0 10e9/L    % Neutrophils 45.6 40.0 - 75.0 %    % Lymphocytes 40.0 20.0 - 48.0 %    % Monocytes 10 0 - 12 %    % Eosinophils 4 0 - 6 %    % Basophils 0 0 - 2 %    % Immature Granulocytes 0 0 - 0 %    Absolute Neutrophil 2.0 1.6 - 8.3 10e9/L    Lymphocytes # 1.8 0.8 - 5.3 10e9/L    Absolute Monocytes 0.5 0.0 - 1.3 10e9/L    Absolute Eosinophils 0.2 0.0 - 0.7 10e9/L    Absolute Basophils 0.0 0.0 - 0.2 10e9/L    Abs Immature Granulocytes 0.0 0.0 - 0.0 10e9/L       If you have any questions or concerns, please call the clinic at the number listed above.       Sincerely,      Kristian Muhammad MD

## 2021-04-19 NOTE — NURSING NOTE
Injectable influenza vaccine documentation    1. Has the patient received the information for the influenza vaccine? YES    2. Does the patient have a severe allergy to eggs (Patients with a severe egg allergy should be assessed by a medical provider, RN, or clinical pharmacist. If they receive the influenza vaccine, please have them observed for 15 minutes.)? No    3. Has the patient had an allergic reaction to previous influenza vaccines? No    4. Has the patient had any severe allergic reactions to past influenza vaccines ? No       5. Does patient have a history of Guillain-Cameron syndrome? No              Based on responses above, I administered the influenza vaccine.  November Paw, CMA

## 2021-04-29 ENCOUNTER — MEDICAL CORRESPONDENCE (OUTPATIENT)
Dept: HEALTH INFORMATION MANAGEMENT | Facility: CLINIC | Age: 81
End: 2021-04-29

## 2021-04-30 ENCOUNTER — DOCUMENTATION ONLY (OUTPATIENT)
Dept: FAMILY MEDICINE | Facility: CLINIC | Age: 81
End: 2021-04-30

## 2021-04-30 NOTE — PROGRESS NOTES
To be completed in Nursing note:    Please reference list for forms that require a visit for completion.  Please remind patients that providers are given 3-5 business days to complete and return forms.        Form type:  [facility]: Physician Orders     Date form received:  2021    Date form completed by Physician:  2021    How was form returned to patient (mailed, faxed, or at  for patient to ):  Fax to [phone]    Date form mailed/faxed/left at  for patient and sent to HIM for scannin2021          Once form is left for patient, faxed, or mailed PCS will then close the documentation only encounter.

## 2021-05-03 ENCOUNTER — MEDICAL CORRESPONDENCE (OUTPATIENT)
Dept: HEALTH INFORMATION MANAGEMENT | Facility: CLINIC | Age: 81
End: 2021-05-03

## 2021-05-17 ENCOUNTER — DOCUMENTATION ONLY (OUTPATIENT)
Dept: FAMILY MEDICINE | Facility: CLINIC | Age: 81
End: 2021-05-17

## 2021-05-17 ENCOUNTER — MEDICAL CORRESPONDENCE (OUTPATIENT)
Dept: HEALTH INFORMATION MANAGEMENT | Facility: CLINIC | Age: 81
End: 2021-05-17

## 2021-05-17 NOTE — PROGRESS NOTES
To be completed in Nursing note:    Please reference list for forms that require a visit for completion.  Please remind patients that providers are given 3-5 business days to complete and return forms.      Form type: [facility]/ Physician Orders    Date form received: 2021    Date form completed by Physician: 2021    How was form returned to patient (mailed, faxed, or at  for patient to ): faxed to [phone]    Date form mailed/faxed/left at  for patient and sent to HIM for scannin2021      Once form is left for patient, faxed, or mailed PCS will then close the documentation only encounter.

## 2021-06-02 NOTE — TELEPHONE ENCOUNTER
Received referral from Dr. Muhammad for SNV, PT, and HHA. I went to see pt to start home care today with [facility]. Son states pt already has a nurse coming out to set up his medications and has had home care for 2+yrs.   Spoke with nurse from [facility] and they also have PT option.   Pt would like to stay with [facility] agency.  Please send referral to [facility].  Thank you!  Randi Ibarra RN

## 2021-06-09 ENCOUNTER — MEDICAL CORRESPONDENCE (OUTPATIENT)
Dept: HEALTH INFORMATION MANAGEMENT | Facility: CLINIC | Age: 81
End: 2021-06-09

## 2021-06-10 ENCOUNTER — DOCUMENTATION ONLY (OUTPATIENT)
Dept: FAMILY MEDICINE | Facility: CLINIC | Age: 81
End: 2021-06-10

## 2021-06-10 NOTE — PROGRESS NOTES
To be completed in Nursing note:    Please reference list for forms that require a visit for completion.  Please remind patients that providers are given 3-5 business days to complete and return forms.      Form type:  [facility]: Plan of Care     Date form received:  2021    Date form completed by Physician:  2021    How was form returned to patient (mailed, faxed, or at  for patient to ):  Fax to [phone]    Date form mailed/faxed/left at  for patient and sent to HIM for scannin/10/2021        Once form is left for patient, faxed, or mailed PCS will then close the documentation only encounter.

## 2021-06-21 ENCOUNTER — TRANSFERRED RECORDS (OUTPATIENT)
Dept: HEALTH INFORMATION MANAGEMENT | Facility: CLINIC | Age: 81
End: 2021-06-21

## 2021-06-21 LAB — RETINOPATHY: NEGATIVE

## 2021-08-05 DIAGNOSIS — D50.8 IRON DEFICIENCY ANEMIA SECONDARY TO INADEQUATE DIETARY IRON INTAKE: ICD-10-CM

## 2021-08-05 DIAGNOSIS — Z00.00 ROUTINE GENERAL MEDICAL EXAMINATION AT A HEALTH CARE FACILITY: ICD-10-CM

## 2021-08-05 RX ORDER — FERROUS SULFATE 325(65) MG
TABLET ORAL
Qty: 30 TABLET | Refills: 11 | Status: SHIPPED | OUTPATIENT
Start: 2021-08-05 | End: 2023-12-07

## 2021-08-05 RX ORDER — ASPIRIN 81 MG/1
TABLET, COATED ORAL
Qty: 90 TABLET | Refills: 4 | Status: SHIPPED | OUTPATIENT
Start: 2021-08-05 | End: 2023-03-02

## 2021-08-06 ENCOUNTER — DOCUMENTATION ONLY (OUTPATIENT)
Dept: FAMILY MEDICINE | Facility: CLINIC | Age: 81
End: 2021-08-06

## 2021-08-06 NOTE — PROGRESS NOTES
To be completed in Nursing note:    Please reference list for forms that require a visit for completion.  Please remind patients that providers are given 3-5 business days to complete and return forms.      Form type:  [facility]: Plan of Care       Date form received:  2021    Date form completed by Physician:  2021    How was form returned to patient (mailed, faxed, or at  for patient to ):  fax to [phone]      Date form mailed/faxed/left at  for patient and sent to HIM for scannin2021        Once form is left for patient, faxed, or mailed PCS will then close the documentation only encounter.

## 2021-08-13 ENCOUNTER — MEDICAL CORRESPONDENCE (OUTPATIENT)
Dept: HEALTH INFORMATION MANAGEMENT | Facility: CLINIC | Age: 81
End: 2021-08-13

## 2021-09-13 ENCOUNTER — TELEPHONE (OUTPATIENT)
Dept: FAMILY MEDICINE | Facility: CLINIC | Age: 81
End: 2021-09-13
Payer: COMMERCIAL

## 2021-09-13 NOTE — TELEPHONE ENCOUNTER
Pharmacy needs clarification on gabapentin and they have two different instructions. Please call back.

## 2021-09-14 DIAGNOSIS — M15.9 OSTEOARTHRITIS OF MULTIPLE JOINTS, UNSPECIFIED OSTEOARTHRITIS TYPE: Primary | ICD-10-CM

## 2021-09-14 RX ORDER — GABAPENTIN 300 MG/1
300 CAPSULE ORAL 2 TIMES DAILY PRN
Qty: 60 CAPSULE | Refills: 11 | Status: SHIPPED | OUTPATIENT
Start: 2021-09-14 | End: 2022-03-31

## 2021-10-04 DIAGNOSIS — Z53.9 DIAGNOSIS NOT YET DEFINED: Primary | ICD-10-CM

## 2021-10-08 ENCOUNTER — MEDICAL CORRESPONDENCE (OUTPATIENT)
Dept: HEALTH INFORMATION MANAGEMENT | Facility: CLINIC | Age: 81
End: 2021-10-08

## 2021-10-08 DIAGNOSIS — U07.1 2019 NOVEL CORONAVIRUS DISEASE (COVID-19): ICD-10-CM

## 2021-10-08 DIAGNOSIS — M15.9 OSTEOARTHRITIS OF MULTIPLE JOINTS, UNSPECIFIED OSTEOARTHRITIS TYPE: ICD-10-CM

## 2021-10-08 RX ORDER — ACETAMINOPHEN 325 MG/1
TABLET ORAL
Qty: 100 TABLET | Refills: 3 | Status: SHIPPED | OUTPATIENT
Start: 2021-10-08 | End: 2022-03-31

## 2021-10-11 ENCOUNTER — DOCUMENTATION ONLY (OUTPATIENT)
Dept: FAMILY MEDICINE | Facility: CLINIC | Age: 81
End: 2021-10-11

## 2021-10-11 NOTE — PROGRESS NOTES
To be completed in Nursing note:    Please reference list for forms that require a visit for completion.  Please remind patients that providers are given 3-5 business days to complete and return forms.      Form type:  [facility]: Revision To Plan of Care       Date form received:  10-    Date form completed by Physician:  10-    How was form returned to patient (mailed, faxed, or at  for patient to ):  Fax to [phone]    Date form mailed/faxed/left at  for patient and sent to HIM for scanning:  10-           Once form is left for patient, faxed, or mailed PCS will then close the documentation only encounter.

## 2021-11-09 DIAGNOSIS — Z53.9 DIAGNOSIS NOT YET DEFINED: Primary | ICD-10-CM

## 2021-11-10 ENCOUNTER — MEDICAL CORRESPONDENCE (OUTPATIENT)
Dept: HEALTH INFORMATION MANAGEMENT | Facility: CLINIC | Age: 81
End: 2021-11-10
Payer: COMMERCIAL

## 2021-11-11 ENCOUNTER — DOCUMENTATION ONLY (OUTPATIENT)
Dept: FAMILY MEDICINE | Facility: CLINIC | Age: 81
End: 2021-11-11
Payer: COMMERCIAL

## 2021-11-11 NOTE — PROGRESS NOTES
To be completed in Nursing note:    Please reference list for forms that require a visit for completion.  Please remind patients that providers are given 3-5 business days to complete and return forms.      Form type:  [facility]:  Revision To Plan Of Care / Home Health Certification and Plan of Care     Date form received:  2021    Date form completed by Physician:  11/10/2021    How was form returned to patient (mailed, faxed, or at  for patient to ):  Fax to [phone]    Date form mailed/faxed/left at  for patient and sent to HIM for scannin2021               Once form is left for patient, faxed, or mailed PCS will then close the documentation only encounter.

## 2021-11-12 ENCOUNTER — TELEPHONE (OUTPATIENT)
Dept: FAMILY MEDICINE | Facility: CLINIC | Age: 81
End: 2021-11-12
Payer: COMMERCIAL

## 2021-11-12 DIAGNOSIS — R05.9 COUGH: Primary | ICD-10-CM

## 2021-11-12 RX ORDER — GUAIFENESIN 200 MG/1
200 TABLET ORAL EVERY 4 HOURS PRN
Qty: 30 TABLET | Refills: 0 | Status: SHIPPED | OUTPATIENT
Start: 2021-11-12 | End: 2021-11-19

## 2021-11-12 NOTE — TELEPHONE ENCOUNTER
[facility] reporting pt torsten cough  Patient has no other symptoms just a dry cough which he has had for approx one month    Note routed to   /CARLOS EDUARDO

## 2021-11-12 NOTE — TELEPHONE ENCOUNTER
[facility] phone call message - order or referral request for patient:     Order or referral being requested: med order       Additional Comments: the home care nurse called to ask for  An order for cough syrup     OK to leave a message on voice mail? Yes    Primary language: Bengali      needed? Yes    Call taken on November 12, 2021 at 4:09 PM by Hal Bhat

## 2021-11-18 ENCOUNTER — OFFICE VISIT (OUTPATIENT)
Dept: FAMILY MEDICINE | Facility: CLINIC | Age: 81
End: 2021-11-18
Payer: COMMERCIAL

## 2021-11-18 VITALS
SYSTOLIC BLOOD PRESSURE: 133 MMHG | DIASTOLIC BLOOD PRESSURE: 68 MMHG | OXYGEN SATURATION: 95 % | BODY MASS INDEX: 23.81 KG/M2 | RESPIRATION RATE: 18 BRPM | TEMPERATURE: 98.6 F | WEIGHT: 130.2 LBS | HEART RATE: 77 BPM

## 2021-11-18 DIAGNOSIS — E11.9 TYPE 2 DIABETES MELLITUS WITHOUT COMPLICATION, WITHOUT LONG-TERM CURRENT USE OF INSULIN (H): ICD-10-CM

## 2021-11-18 DIAGNOSIS — Z23 NEED FOR PROPHYLACTIC VACCINATION AND INOCULATION AGAINST INFLUENZA: ICD-10-CM

## 2021-11-18 DIAGNOSIS — M15.9 OSTEOARTHRITIS OF MULTIPLE JOINTS, UNSPECIFIED OSTEOARTHRITIS TYPE: Primary | ICD-10-CM

## 2021-11-18 DIAGNOSIS — R12 HEARTBURN: ICD-10-CM

## 2021-11-18 DIAGNOSIS — Z23 HIGH PRIORITY FOR 2019-NCOV VACCINE: ICD-10-CM

## 2021-11-18 LAB — HBA1C MFR BLD: 5.8 % (ref 0–5.6)

## 2021-11-18 PROCEDURE — 0004A COVID-19,PF,PFIZER (12+ YRS): CPT | Performed by: FAMILY MEDICINE

## 2021-11-18 PROCEDURE — 36415 COLL VENOUS BLD VENIPUNCTURE: CPT | Performed by: FAMILY MEDICINE

## 2021-11-18 PROCEDURE — 83036 HEMOGLOBIN GLYCOSYLATED A1C: CPT | Performed by: FAMILY MEDICINE

## 2021-11-18 PROCEDURE — 99214 OFFICE O/P EST MOD 30 MIN: CPT | Mod: 25 | Performed by: FAMILY MEDICINE

## 2021-11-18 PROCEDURE — 91300 COVID-19,PF,PFIZER (12+ YRS): CPT | Performed by: FAMILY MEDICINE

## 2021-11-18 PROCEDURE — 90471 IMMUNIZATION ADMIN: CPT | Performed by: FAMILY MEDICINE

## 2021-11-18 PROCEDURE — 90662 IIV NO PRSV INCREASED AG IM: CPT | Performed by: FAMILY MEDICINE

## 2021-11-18 RX ORDER — FAMOTIDINE 40 MG/1
TABLET, FILM COATED ORAL
Qty: 30 TABLET | Refills: 11 | Status: SHIPPED | OUTPATIENT
Start: 2021-11-18 | End: 2022-03-31

## 2021-11-19 DIAGNOSIS — R05.9 COUGH: ICD-10-CM

## 2021-11-19 RX ORDER — GUAIFENESIN 200 MG/1
200 TABLET ORAL EVERY 4 HOURS PRN
Qty: 30 TABLET | Refills: 0 | Status: SHIPPED | OUTPATIENT
Start: 2021-11-19 | End: 2023-06-29

## 2021-11-19 NOTE — PROGRESS NOTES
"  Assessment & Plan     Osteoarthritis of multiple joints, unspecified osteoarthritis type  Ongoing pains. Continue APAP and other conservative measures    Type 2 diabetes mellitus without complication, without long-term current use of insulin (H)  Controlled  - Hemoglobin A1c; Future  - Hemoglobin A1c    Heartburn  Off pepcid since July, now needed frequent prn otc dung seltzer  - famotidine (PEPCID) 40 MG tablet; TAKE 1 TABLET (40 MG) BY MOUTH DAILY AS NEEDED FOR HEARTBURN (REPLACES RANITIDINE/ZANTAC)    High priority for 2019-nCoV vaccine    - COVID-19,PF,PFIZER (12+ Yrs PURPLE LABEL)    Need for prophylactic vaccination and inoculation against influenza    - INFLUENZA, QUAD, HIGH DOSE, PF, 65YR + (FLUZONE HD)      Kristian Muhammad MD  [facility]    Dain Hillman is a 81 year old who presents for the following health issues  accompanied by his son.    HPI   Brought in by son. Has been feeling \"OK\". Back pains continue. Tylenol and topical treatments help. Has had increased heartburn, and prn otc dung selzer use since July    Review of Systems   Constitutional, HEENT, cardiovascular, pulmonary, gi and gu systems are negative, except as otherwise noted.      Objective    /68 (BP Location: Left arm, Patient Position: Sitting, Cuff Size: Adult Regular)   Pulse 77   Temp 98.6  F (37  C) (Oral)   Resp 18   Wt 59.1 kg (130 lb 3.2 oz)   SpO2 95%   BMI 23.81 kg/m    Body mass index is 23.81 kg/m .  Physical Exam   GENERAL: healthy, alert and no distress  NECK: no adenopathy, no asymmetry, masses, or scars and thyroid normal to palpation  RESP: lungs clear to auscultation - no rales, rhonchi or wheezes  CV: regular rate and rhythm, normal S1 S2, no S3 or S4, no murmur, click or rub, no peripheral edema and peripheral pulses strong  ABDOMEN: soft, nontender, no hepatosplenomegaly, no masses and bowel sounds normal  MS: no gross musculoskeletal defects noted, Diffuse lumbar muscle pain " bilaterally.  no edema    Results for orders placed or performed in visit on 11/18/21   Hemoglobin A1c     Status: Abnormal   Result Value Ref Range    Hemoglobin A1C 5.8 (H) 0.0 - 5.6 %

## 2021-11-26 DIAGNOSIS — Z53.9 DIAGNOSIS NOT YET DEFINED: Primary | ICD-10-CM

## 2021-11-30 ENCOUNTER — DOCUMENTATION ONLY (OUTPATIENT)
Dept: FAMILY MEDICINE | Facility: CLINIC | Age: 81
End: 2021-11-30
Payer: COMMERCIAL

## 2021-11-30 NOTE — PROGRESS NOTES
To be completed in Nursing note:    Please reference list for forms that require a visit for completion.  Please remind patients that providers are given 3-5 business days to complete and return forms.      Form type:  [facility]:  Home Health Certification and Plan of Care      Date form received:  2021    Date form completed by Physician:  2021    How was form returned to patient (mailed, faxed, or at  for patient to ):  fax to [phone]         Date form mailed/faxed/left at  for patient and sent to HIM for scannin2021            Once form is left for patient, faxed, or mailed PCS will then close the documentation only encounter.

## 2022-01-05 ENCOUNTER — DOCUMENTATION ONLY (OUTPATIENT)
Dept: FAMILY MEDICINE | Facility: CLINIC | Age: 82
End: 2022-01-05
Payer: COMMERCIAL

## 2022-01-05 NOTE — PROGRESS NOTES
To be completed in Nursing note:    Please reference list for forms that require a visit for completion.  Please remind patients that providers are given 3-5 business days to complete and return forms.      Form type:  [facility]:  Prescription / Certificate of Medical Necessity      Date form received:  2021    Date form completed by Physician:  2022    How was form returned to patient (mailed, faxed, or at  for patient to ):  fax to [phone]                Date form mailed/faxed/left at  for patient and sent to HIM for scannin2022           Once form is left for patient, faxed, or mailed PCS will then close the documentation only encounter.

## 2022-01-06 ENCOUNTER — MEDICAL CORRESPONDENCE (OUTPATIENT)
Dept: HEALTH INFORMATION MANAGEMENT | Facility: CLINIC | Age: 82
End: 2022-01-06
Payer: COMMERCIAL

## 2022-01-20 ENCOUNTER — DOCUMENTATION ONLY (OUTPATIENT)
Dept: FAMILY MEDICINE | Facility: CLINIC | Age: 82
End: 2022-01-20
Payer: COMMERCIAL

## 2022-01-20 NOTE — PROGRESS NOTES
To be completed in Nursing note:    Please reference list for forms that require a visit for completion.  Please remind patients that providers are given 3-5 business days to complete and return forms.      Form type:  [facility]:  Home Health Certification and Plan of Care      Date form received:  2022    Date form completed by Physician:  2022    How was form returned to patient (mailed, faxed, or at  for patient to ):  fax to [phone]      Date form mailed/faxed/left at  for patient and sent to HIM for scannin2022          Once form is left for patient, faxed, or mailed PCS will then close the documentation only encounter.

## 2022-01-31 ENCOUNTER — MEDICAL CORRESPONDENCE (OUTPATIENT)
Dept: HEALTH INFORMATION MANAGEMENT | Facility: CLINIC | Age: 82
End: 2022-01-31
Payer: COMMERCIAL

## 2022-02-01 ENCOUNTER — DOCUMENTATION ONLY (OUTPATIENT)
Dept: FAMILY MEDICINE | Facility: CLINIC | Age: 82
End: 2022-02-01
Payer: COMMERCIAL

## 2022-02-01 NOTE — PROGRESS NOTES
To be completed in Nursing note:    Please reference list for forms that require a visit for completion.  Please remind patients that providers are given 3-5 business days to complete and return forms.      Form type:  [employer]:  Prescription Request     Date form received:  2022    Date form completed by Physician:  2022    How was form returned to patient (mailed, faxed, or at  for patient to ):  fax to [phone]        Date form mailed/faxed/left at  for patient and sent to HIM for scannin2022        Once form is left for patient, faxed, or mailed PCS will then close the documentation only encounter.

## 2022-02-01 NOTE — PROGRESS NOTES
To be completed in Nursing note:    Please reference list for forms that require a visit for completion.  Please remind patients that providers are given 3-5 business days to complete and return forms.      Form type:  [facility]:  Revision to Plan of Care      Date form received:  2022    Date form completed by Physician:  2022    How was form returned to patient (mailed, faxed, or at  for patient to ):  Fax to [phone]    Date form mailed/faxed/left at  for patient and sent to HIM for scannin2022           Once form is left for patient, faxed, or mailed PCS will then close the documentation only encounter.

## 2022-02-14 ENCOUNTER — DOCUMENTATION ONLY (OUTPATIENT)
Dept: FAMILY MEDICINE | Facility: CLINIC | Age: 82
End: 2022-02-14
Payer: COMMERCIAL

## 2022-02-14 NOTE — PROGRESS NOTES
To be completed in Nursing note:    Please reference list for forms that require a visit for completion.  Please remind patients that providers are given 3-5 business days to complete and return forms.      Form type:  [facility]:  Revision to Plan of Care        Date form received:  2022    Date form completed by Physician:  02/15/2022    How was form returned to patient (mailed, faxed, or at  for patient to ):  fax to [phone]       Date form mailed/faxed/left at  for patient and sent to HIM for scannin/15/2022         Once form is left for patient, faxed, or mailed PCS will then close the documentation only encounter.

## 2022-02-16 ENCOUNTER — DOCUMENTATION ONLY (OUTPATIENT)
Dept: FAMILY MEDICINE | Facility: CLINIC | Age: 82
End: 2022-02-16
Payer: COMMERCIAL

## 2022-02-16 NOTE — PROGRESS NOTES
To be completed in Nursing note:    Please reference list for forms that require a visit for completion.  Please remind patients that providers are given 3-5 business days to complete and return forms.      Form type:  [facility]:  Home Health Certification and Plan of Care  & Revision to Plan of Care      Date form received:  02/15/2022    Date form completed by Physician:  2022    How was form returned to patient (mailed, faxed, or at  for patient to ):  fax to [phone]        Date form mailed/faxed/left at  for patient and sent to HIM for scannin2022        Once form is left for patient, faxed, or mailed PCS will then close the documentation only encounter.

## 2022-03-08 ENCOUNTER — DOCUMENTATION ONLY (OUTPATIENT)
Dept: FAMILY MEDICINE | Facility: CLINIC | Age: 82
End: 2022-03-08
Payer: COMMERCIAL

## 2022-03-08 NOTE — PROGRESS NOTES
To be completed in Nursing note:    Please reference list for forms that require a visit for completion.  Please remind patients that providers are given 3-5 business days to complete and return forms.      Form type:  [facility]:  Revision to plan of care       Date form received:  2022    Date form completed by Physician:  03/10/2022    How was form returned to patient (mailed, faxed, or at  for patient to ):  fax to [phone]      Date form mailed/faxed/left at  for patient and sent to HIM for scannin2022        Once form is left for patient, faxed, or mailed PCS will then close the documentation only encounter.

## 2022-03-10 ENCOUNTER — MEDICAL CORRESPONDENCE (OUTPATIENT)
Dept: HEALTH INFORMATION MANAGEMENT | Facility: CLINIC | Age: 82
End: 2022-03-10
Payer: COMMERCIAL

## 2022-03-11 ENCOUNTER — TELEPHONE (OUTPATIENT)
Dept: FAMILY MEDICINE | Facility: CLINIC | Age: 82
End: 2022-03-11
Payer: COMMERCIAL

## 2022-03-11 NOTE — TELEPHONE ENCOUNTER
[facility] phone call message-patient reporting a symptom:     Symptom: Pt is complaining of dizzy x a couple of months.  He is wondering is there is something he can try to ease the dizziness.    Same Day Visit Offered: no to late in the day    Additional comments: none    OK to leave message on voice mail? Yes    Primary language: [facility]      needed? Yes    Call taken on March 11, 2022 at 4:22 PM by Wayne Salas

## 2022-03-11 NOTE — TELEPHONE ENCOUNTER
Spoke with patient's son with [facility] . He confirmed this dizziness has been ongoing for months without changes. Unclear when it happens (at rest or on standing). No other symptoms. He is agreeable to scheduling but declines to see provider other than Dr. Muhammad who does not have openings until 3/31. He is aware to call back if symptoms worsen. ./LR

## 2022-03-28 ENCOUNTER — DOCUMENTATION ONLY (OUTPATIENT)
Dept: FAMILY MEDICINE | Facility: CLINIC | Age: 82
End: 2022-03-28
Payer: COMMERCIAL

## 2022-03-28 NOTE — PROGRESS NOTES
To be completed in Nursing note:    Please reference list for forms that require a visit for completion.  Please remind patients that providers are given 3-5 business days to complete and return forms.      Form type:  Home Health Care:  Home Health Certification and Plan of Care        Date form received:  2022    Date form completed by Physician:  2022    How was form returned to patient (mailed, faxed, or at  for patient to ):  Fax to [phone]                 Date form mailed/faxed/left at  for patient and sent to HIM for scannin2022        Once form is left for patient, faxed, or mailed PCS will then close the documentation only encounter.

## 2022-03-31 ENCOUNTER — OFFICE VISIT (OUTPATIENT)
Dept: FAMILY MEDICINE | Facility: CLINIC | Age: 82
End: 2022-03-31
Payer: COMMERCIAL

## 2022-03-31 VITALS
HEART RATE: 61 BPM | TEMPERATURE: 98.5 F | RESPIRATION RATE: 18 BRPM | OXYGEN SATURATION: 95 % | SYSTOLIC BLOOD PRESSURE: 132 MMHG | DIASTOLIC BLOOD PRESSURE: 69 MMHG

## 2022-03-31 DIAGNOSIS — E11.9 TYPE 2 DIABETES MELLITUS WITHOUT COMPLICATION, WITHOUT LONG-TERM CURRENT USE OF INSULIN (H): ICD-10-CM

## 2022-03-31 DIAGNOSIS — E03.9 HYPOTHYROIDISM, UNSPECIFIED TYPE: ICD-10-CM

## 2022-03-31 DIAGNOSIS — R68.89 FORGETFULNESS: ICD-10-CM

## 2022-03-31 DIAGNOSIS — U07.1 2019 NOVEL CORONAVIRUS DISEASE (COVID-19): ICD-10-CM

## 2022-03-31 DIAGNOSIS — E78.5 HYPERLIPIDEMIA LDL GOAL <100: ICD-10-CM

## 2022-03-31 DIAGNOSIS — I10 ESSENTIAL HYPERTENSION WITH GOAL BLOOD PRESSURE LESS THAN 140/90: ICD-10-CM

## 2022-03-31 DIAGNOSIS — M1A.9XX0 CHRONIC GOUT WITHOUT TOPHUS, UNSPECIFIED CAUSE, UNSPECIFIED SITE: ICD-10-CM

## 2022-03-31 DIAGNOSIS — R12 HEARTBURN: ICD-10-CM

## 2022-03-31 DIAGNOSIS — M15.9 OSTEOARTHRITIS OF MULTIPLE JOINTS, UNSPECIFIED OSTEOARTHRITIS TYPE: ICD-10-CM

## 2022-03-31 DIAGNOSIS — E03.9 ACQUIRED HYPOTHYROIDISM: Primary | ICD-10-CM

## 2022-03-31 LAB
ANION GAP SERPL CALCULATED.3IONS-SCNC: 10 MMOL/L (ref 5–18)
BUN SERPL-MCNC: 10 MG/DL (ref 8–28)
CALCIUM SERPL-MCNC: 10 MG/DL (ref 8.5–10.5)
CHLORIDE BLD-SCNC: 107 MMOL/L (ref 98–107)
CHOLEST SERPL-MCNC: 104 MG/DL
CO2 SERPL-SCNC: 24 MMOL/L (ref 22–31)
CREAT SERPL-MCNC: 1.02 MG/DL (ref 0.7–1.3)
CREAT UR-MCNC: 62 MG/DL
ERYTHROCYTE [DISTWIDTH] IN BLOOD BY AUTOMATED COUNT: 14.8 % (ref 10–15)
FASTING STATUS PATIENT QL REPORTED: NORMAL
GFR SERPL CREATININE-BSD FRML MDRD: 73 ML/MIN/1.73M2
GLUCOSE BLD-MCNC: 99 MG/DL (ref 70–125)
HCT VFR BLD AUTO: 36 % (ref 40–53)
HDLC SERPL-MCNC: 46 MG/DL
HGB BLD-MCNC: 11.7 G/DL (ref 13.3–17.7)
LDLC SERPL CALC-MCNC: 47 MG/DL
MCH RBC QN AUTO: 29.7 PG (ref 26.5–33)
MCHC RBC AUTO-ENTMCNC: 32.5 G/DL (ref 31.5–36.5)
MCV RBC AUTO: 91 FL (ref 78–100)
MICROALBUMIN UR-MCNC: 0.71 MG/DL (ref 0–1.99)
MICROALBUMIN/CREAT UR: 11.5 MG/G CR
PLATELET # BLD AUTO: 179 10E3/UL (ref 150–450)
POTASSIUM BLD-SCNC: 4.8 MMOL/L (ref 3.5–5)
RBC # BLD AUTO: 3.94 10E6/UL (ref 4.4–5.9)
SODIUM SERPL-SCNC: 141 MMOL/L (ref 136–145)
TRIGL SERPL-MCNC: 56 MG/DL
TSH SERPL DL<=0.005 MIU/L-ACNC: 0.57 UIU/ML (ref 0.3–5)
WBC # BLD AUTO: 5.2 10E3/UL (ref 4–11)

## 2022-03-31 PROCEDURE — 80061 LIPID PANEL: CPT | Performed by: FAMILY MEDICINE

## 2022-03-31 PROCEDURE — 80048 BASIC METABOLIC PNL TOTAL CA: CPT | Performed by: FAMILY MEDICINE

## 2022-03-31 PROCEDURE — 82043 UR ALBUMIN QUANTITATIVE: CPT | Performed by: FAMILY MEDICINE

## 2022-03-31 PROCEDURE — 84443 ASSAY THYROID STIM HORMONE: CPT | Performed by: FAMILY MEDICINE

## 2022-03-31 PROCEDURE — 85027 COMPLETE CBC AUTOMATED: CPT | Performed by: FAMILY MEDICINE

## 2022-03-31 PROCEDURE — 99214 OFFICE O/P EST MOD 30 MIN: CPT | Performed by: FAMILY MEDICINE

## 2022-03-31 PROCEDURE — 36415 COLL VENOUS BLD VENIPUNCTURE: CPT | Performed by: FAMILY MEDICINE

## 2022-03-31 RX ORDER — LEVOTHYROXINE SODIUM 75 UG/1
75 TABLET ORAL DAILY
Qty: 90 TABLET | Refills: 3 | Status: SHIPPED | OUTPATIENT
Start: 2022-03-31 | End: 2023-04-26

## 2022-03-31 RX ORDER — FAMOTIDINE 40 MG/1
40 TABLET, FILM COATED ORAL DAILY
Qty: 30 TABLET | Refills: 11 | Status: SHIPPED | OUTPATIENT
Start: 2022-03-31 | End: 2023-04-26

## 2022-03-31 RX ORDER — GABAPENTIN 300 MG/1
300 CAPSULE ORAL 2 TIMES DAILY PRN
Qty: 60 CAPSULE | Refills: 11 | Status: SHIPPED | OUTPATIENT
Start: 2022-03-31 | End: 2023-04-26

## 2022-03-31 RX ORDER — MULTIPLE VITAMINS W/ MINERALS TAB 9MG-400MCG
1 TAB ORAL DAILY
Qty: 100 TABLET | Refills: 3 | Status: SHIPPED | OUTPATIENT
Start: 2022-03-31 | End: 2023-03-02

## 2022-03-31 RX ORDER — ATORVASTATIN CALCIUM 40 MG/1
40 TABLET, FILM COATED ORAL DAILY
Qty: 90 TABLET | Refills: 3 | Status: SHIPPED | OUTPATIENT
Start: 2022-03-31 | End: 2023-05-26

## 2022-03-31 RX ORDER — LISINOPRIL 20 MG/1
20 TABLET ORAL DAILY
Qty: 90 TABLET | Refills: 3 | Status: SHIPPED | OUTPATIENT
Start: 2022-03-31 | End: 2023-05-26

## 2022-03-31 RX ORDER — ALLOPURINOL 100 MG/1
100 TABLET ORAL DAILY
Qty: 90 TABLET | Refills: 3 | Status: SHIPPED | OUTPATIENT
Start: 2022-03-31 | End: 2023-04-26

## 2022-03-31 RX ORDER — DULOXETIN HYDROCHLORIDE 30 MG/1
90 CAPSULE, DELAYED RELEASE ORAL DAILY
Qty: 90 CAPSULE | Refills: 11 | Status: SHIPPED | OUTPATIENT
Start: 2022-03-31 | End: 2023-04-26

## 2022-03-31 RX ORDER — ACETAMINOPHEN 325 MG/1
TABLET ORAL
Qty: 100 TABLET | Refills: 3 | Status: SHIPPED | OUTPATIENT
Start: 2022-03-31 | End: 2022-11-28

## 2022-03-31 ASSESSMENT — PATIENT HEALTH QUESTIONNAIRE - PHQ9: SUM OF ALL RESPONSES TO PHQ QUESTIONS 1-9: 16

## 2022-03-31 NOTE — NURSING NOTE
Due to patient being non-English speaking/uses sign language, an  was used for this visit. Only for face-to-face interpretation by an external agency, date and length of interpretation can be found on the scanned worksheet.     name: Angelito  Agency: [employer]  Language: Maciej    Telephone number: [phone]  Type of interpretation: Face-to-face, spoken

## 2022-03-31 NOTE — PROGRESS NOTES
Assessment & Plan     Type 2 diabetes mellitus without complication, without long-term current use of insulin (H)  Controlled. Recheck A1c in 6 months  - TSH with free T4 reflex; Future  - CBC with platelets; Future  - Basic metabolic panel; Future  - Lipid Profile; Future  - Albumin Random Urine Quantitative with Creat Ratio; Future  - Albumin Random Urine Quantitative with Creat Ratio  - Lipid Profile  - Basic metabolic panel  - CBC with platelets  - TSH with free T4 reflex    2019 novel coronavirus disease (COVID-19)  REcovered  - acetaminophen (TYLENOL) 325 MG tablet; TAKE 1 OR 2 TABLETS BY MOUTH EVERY 6 HOURS AS NEEDED FOR MILD PAIN    Osteoarthritis of multiple joints, unspecified osteoarthritis type  Ongoing issues, continue conservative management. Disability parking certificate authorized.  - acetaminophen (TYLENOL) 325 MG tablet; TAKE 1 OR 2 TABLETS BY MOUTH EVERY 6 HOURS AS NEEDED FOR MILD PAIN  - DULoxetine (CYMBALTA) 30 MG capsule; Take 3 capsules (90 mg) by mouth daily  - gabapentin (NEURONTIN) 300 MG capsule; Take 1 capsule (300 mg) by mouth 2 times daily as needed (pain)    Chronic gout without tophus, unspecified cause, unspecified site  Stable. No gout flares in last year on Rx and following diet  - allopurinol (ZYLOPRIM) 100 MG tablet; Take 1 tablet (100 mg) by mouth daily    Essential hypertension with goal blood pressure less than 140/90  Controlled  - lisinopril (ZESTRIL) 20 MG tablet; Take 1 tablet (20 mg) by mouth daily    Forgetfulness  Stable. Frailty unchanged over past years. Continues to be cared for in his home by family  - multivitamin w/minerals (MULTIVITAMIN, THERAPEUTIC WITH MINERALS) tablet; Take 1 tablet by mouth daily    Heartburn  Recent daily burning. Try having pepcid scheduled  - famotidine (PEPCID) 40 MG tablet; Take 1 tablet (40 mg) by mouth daily    Hyperlipidemia LDL goal <100  On Rx. At goal  - atorvastatin (LIPITOR) 40 MG tablet; Take 1 tablet (40 mg) by mouth  daily    Hypothyroidism, unspecified type  On Rx, clinically euthyroid  - levothyroxine (SYNTHROID/LEVOTHROID) 75 MCG tablet; Take 1 tablet (75 mcg) by mouth daily    Acquired hypothyroidism    - TSH with free T4 reflex; Future  - TSH with free T4 reflex    Diagnosis or treatment significantly limited by social determinants of health - limited mobility and lanuage challenges  Prescription drug management  34 minutes spent on the date of the encounter doing chart review, history and exam, documentation and further activities per the note       Depression Screening Follow Up    PHQ 3/31/2022   PHQ-9 Total Score 16   Q9: Thoughts of better off dead/self-harm past 2 weeks Not at all     Last PHQ-9 3/31/2022   1.  Little interest or pleasure in doing things 3   2.  Feeling down, depressed, or hopeless 1   3.  Trouble falling or staying asleep, or sleeping too much 0   4.  Feeling tired or having little energy 3   5.  Poor appetite or overeating 1   6.  Feeling bad about yourself 2   7.  Trouble concentrating 3   8.  Moving slowly or restless 3   Q9: Thoughts of better off dead/self-harm past 2 weeks 0   PHQ-9 Total Score 16   Difficulty at work, home, or with people Extremely dIfficult       Follow Up Actions Taken  Resources offered. Patient declines referral at this time.       Kristian Muhammad MD  [facility]    Dain Hillman is a 82 year old who presents for the following health issues  accompanied by his son and .    HPI     Diabetes Follow-up      How often are you checking your blood sugar? Not at all    What concerns do you have today about your diabetes? None     Do you have any of these symptoms? (Select all that apply)  No numbness or tingling in feet.  No redness, sores or blisters on feet.  No complaints of excessive thirst.  No reports of blurry vision.  No significant changes to weight.      BP Readings from Last 2 Encounters:   03/31/22 132/69   11/18/21 133/68      Hemoglobin A1C POCT (%)   Date Value   04/19/2021 5.9 (H)   02/21/2020 6.1 (H)     Hemoglobin A1C (%)   Date Value   11/18/2021 5.8 (H)     LDL Cholesterol Calculated (mg/dL)   Date Value   03/31/2022 47   04/19/2021 48   04/08/2019 50               Hyperlipidemia Follow-Up      Are you regularly taking any medication or supplement to lower your cholesterol?   Yes- daily    Are you having muscle aches or other side effects that you think could be caused by your cholesterol lowering medication?  No    Hypertension Follow-up      Do you check your blood pressure regularly outside of the clinic? Yes  [facility] nurse    Are you following a low salt diet? Yes    Are your blood pressures ever more than 140 on the top number (systolic) OR more   than 90 on the bottom number (diastolic), for example 140/90? No      How many servings of fruits and vegetables do you eat daily?  2-3    On average, how many sweetened beverages do you drink each day (Examples: soda, juice, sweet tea, etc.  Do NOT count diet or artificially sweetened beverages)?   1    How many days per week do you exercise enough to make your heart beat faster? 4    How many minutes a day do you exercise enough to make your heart beat faster? 10 - 19    How many days per week do you miss taking your medication? 0      Review of Systems   Constitutional, HEENT, cardiovascular, pulmonary, gi and gu systems are negative, except as otherwise noted.      Objective    /69 (BP Location: Left arm, Patient Position: Sitting, Cuff Size: Adult Regular)   Pulse 61   Temp 98.5  F (36.9  C) (Oral)   Resp 18   SpO2 95%   There is no height or weight on file to calculate BMI.  Physical Exam   GENERAL: healthy, alert and no distress  NECK: no adenopathy, no asymmetry, masses, or scars and thyroid normal to palpation  RESP: lungs clear to auscultation - no rales, rhonchi or wheezes  CV: regular rate and rhythm, normal S1 S2, no S3 or S4, no murmur, click or rub, no  peripheral edema and peripheral pulses strong  ABDOMEN: soft, nontender, no hepatosplenomegaly, no masses and bowel sounds normal  MS: no gross musculoskeletal defects noted, no edema    Results for orders placed or performed in visit on 03/31/22 (from the past 24 hour(s))   Albumin Random Urine Quantitative with Creat Ratio   Result Value Ref Range    Microalbumin Urine mg/dL 0.71 0.00 - 1.99 mg/dL    Creatinine Urine mg/dL 62 mg/dL    Microalbumin Urine mg/g Cr 11.5 <=19.9 mg/g Cr    Narrative    Microalbumin, Random Urine   <2.0 mg/dL . . . . . . . . Normal   3.0-30.0 mg/dL . . . . . . Microalbuminuria   >30.0 mg/dL . . . . . .  . Clinical Proteinuria     Microalbumin/Creatinine Ratio, Random Urine   <20 mg/g . . . . .. . . . Normal    mg/g . . . . . . . Microalbuminuria   >300 mg/g . . . . . . . . Clinical Proteinuria   Lipid Profile   Result Value Ref Range    Cholesterol 104 <=199 mg/dL    Triglycerides 56 <=149 mg/dL    Direct Measure HDL 46 >=40 mg/dL    LDL Cholesterol Calculated 47 <=129 mg/dL    Patient Fasting > 8hrs? Unknown    Basic metabolic panel   Result Value Ref Range    Sodium 141 136 - 145 mmol/L    Potassium 4.8 3.5 - 5.0 mmol/L    Chloride 107 98 - 107 mmol/L    Carbon Dioxide (CO2) 24 22 - 31 mmol/L    Anion Gap 10 5 - 18 mmol/L    Urea Nitrogen 10 8 - 28 mg/dL    Creatinine 1.02 0.70 - 1.30 mg/dL    Calcium 10.0 8.5 - 10.5 mg/dL    Glucose 99 70 - 125 mg/dL    GFR Estimate 73 >60 mL/min/1.73m2   CBC with platelets   Result Value Ref Range    WBC Count 5.2 4.0 - 11.0 10e3/uL    RBC Count 3.94 (L) 4.40 - 5.90 10e6/uL    Hemoglobin 11.7 (L) 13.3 - 17.7 g/dL    Hematocrit 36.0 (L) 40.0 - 53.0 %    MCV 91 78 - 100 fL    MCH 29.7 26.5 - 33.0 pg    MCHC 32.5 31.5 - 36.5 g/dL    RDW 14.8 10.0 - 15.0 %    Platelet Count 179 150 - 450 10e3/uL   TSH with free T4 reflex   Result Value Ref Range    TSH 0.57 0.30 - 5.00 uIU/mL

## 2022-03-31 NOTE — PATIENT INSTRUCTIONS
1) Take pepcid every day to prevent heartburn  2) Stop flomax to see if less dizzy.  Monitor for urine flow difficulties  3) Continue Tylenol as needed for back pain      Recheck in 6 months

## 2022-04-01 ENCOUNTER — DOCUMENTATION ONLY (OUTPATIENT)
Dept: FAMILY MEDICINE | Facility: CLINIC | Age: 82
End: 2022-04-01
Payer: COMMERCIAL

## 2022-04-01 NOTE — PROGRESS NOTES
To be completed in Nursing note:    Please reference list for forms that require a visit for completion.  Please remind patients that providers are given 3-5 business days to complete and return forms.      Form type:  Home Health Care:  Revision to Plan of Care       Date form received:   2022    Date form completed by Physician:  2022    How was form returned to patient (mailed, faxed, or at  for patient to ):  Fax to [phone]          Date form mailed/faxed/left at  for patient and sent to HIM for scannin2022              Once form is left for patient, faxed, or mailed PCS will then close the documentation only encounter.

## 2022-04-06 ENCOUNTER — MEDICAL CORRESPONDENCE (OUTPATIENT)
Dept: HEALTH INFORMATION MANAGEMENT | Facility: CLINIC | Age: 82
End: 2022-04-06
Payer: COMMERCIAL

## 2022-04-12 ENCOUNTER — MEDICAL CORRESPONDENCE (OUTPATIENT)
Dept: HEALTH INFORMATION MANAGEMENT | Facility: CLINIC | Age: 82
End: 2022-04-12
Payer: COMMERCIAL

## 2022-04-12 DIAGNOSIS — Z53.9 DIAGNOSIS NOT YET DEFINED: Primary | ICD-10-CM

## 2022-04-13 ENCOUNTER — DOCUMENTATION ONLY (OUTPATIENT)
Dept: FAMILY MEDICINE | Facility: CLINIC | Age: 82
End: 2022-04-13
Payer: COMMERCIAL

## 2022-04-13 NOTE — PROGRESS NOTES
To be completed in Nursing note:    Please reference list for forms that require a visit for completion.  Please remind patients that providers are given 3-5 business days to complete and return forms.      Form type:  Home Health Care:  Revision to Plan of Care & Home Health Certification and Plan of Care                 Date form received:  2022    Date form completed by Physician:  2022    How was form returned to patient (mailed, faxed, or at  for patient to ):  Fax to [phone]      Date form mailed/faxed/left at  for patient and sent to HIM for scannin2022                  Once form is left for patient, faxed, or mailed PCS will then close the documentation only encounter.

## 2022-06-02 ENCOUNTER — DOCUMENTATION ONLY (OUTPATIENT)
Dept: FAMILY MEDICINE | Facility: CLINIC | Age: 82
End: 2022-06-02
Payer: COMMERCIAL

## 2022-06-02 NOTE — PROGRESS NOTES
To be completed in Nursing note:    Please reference list for forms that require a visit for completion.  Please remind patients that providers are given 3-5 business days to complete and return forms.      Form type: HOME HEALTH CARE [facility] ~ HOME HEALTH CERTIFICATION AND PLAN OF CARE 5/10/2022 TO 7/08/2022    Date form received: 5/27/2022    Date form completed by Physician: 6/2/2022    How was form returned to patient (mailed, faxed, or at  for patient to ):    Fax to [phone]    Date form mailed/faxed/left at  for patient and sent to HIM for scanning:    Fax on 6/2/2022    Once form is left for patient, faxed, or mailed PCS will then close the documentation only encounter.

## 2022-07-13 ENCOUNTER — DOCUMENTATION ONLY (OUTPATIENT)
Dept: FAMILY MEDICINE | Facility: CLINIC | Age: 82
End: 2022-07-13

## 2022-07-13 NOTE — PROGRESS NOTES
To be completed in Nursing note:    Please reference list for forms that require a visit for completion.  Please remind patients that providers are given 3-5 business days to complete and return forms.      Form type:  Home Health Care:  Home Health Certification and Plan of Care from 2022 TO 2022          Date form received:  2022    Date form completed by Physician:   2022    How was form returned to patient (mailed, faxed, or at  for patient to ):  fax to [phone]                  Date form mailed/faxed/left at  for patient and sent to HIM for scannin2022               Once form is left for patient, faxed, or mailed PCS will then close the documentation only encounter.

## 2022-07-13 NOTE — PATIENT INSTRUCTIONS
"On cymbalta 60mg. Sleep improved. No new side effects.  Continues with Pains. Appetite remains decreased.  Episodes of \"dizzy\" continue    1) Consider Meeting with therapist at [facility] or [facility]. Call phone number on sheet to schedule  2) USE meclizine as needed for dizzy  3) Continue Cymbalta  " done

## 2022-07-19 ENCOUNTER — DOCUMENTATION ONLY (OUTPATIENT)
Dept: FAMILY MEDICINE | Facility: CLINIC | Age: 82
End: 2022-07-19

## 2022-07-19 NOTE — PROGRESS NOTES
To be completed in Nursing note:    Please reference list for forms that require a visit for completion.  Please remind patients that providers are given 3-5 business days to complete and return forms.      Form type:  [place] [employer]:  Application For Disability Parking Certificate     Date form received:  2022    Date form completed by Physician:  2022    How was form returned to patient (mailed, faxed, or at  for patient to ): call patient's son at [phone] as requested.         Date form mailed/faxed/left at  for patient and sent to HIM for scannin2022                 Once form is left for patient, faxed, or mailed PCS will then close the documentation only encounter.

## 2022-08-18 ENCOUNTER — TELEPHONE (OUTPATIENT)
Dept: FAMILY MEDICINE | Facility: CLINIC | Age: 82
End: 2022-08-18

## 2022-08-18 DIAGNOSIS — E11.9 TYPE 2 DIABETES MELLITUS WITHOUT COMPLICATION, WITHOUT LONG-TERM CURRENT USE OF INSULIN (H): ICD-10-CM

## 2022-08-18 NOTE — TELEPHONE ENCOUNTER
[facility] phone call message- patient requesting a refill:    Full Medication Name: metFORMIN (GLUCOPHAGE) 500 MG tablet    Dose: Take Two tablet (1,000mg) in morning and one tablet (500mg) in evening    Pharmacy confirmed as   [id number] - [place], [place] - [address] [place] [address] AT [facility] [address]  [address] [place] [address]  Phone: [phone] Fax: [phone]  : Yes    Additional Comments: NONE     OK to leave a message on voice mail? Yes    Primary language: [facility]      needed? Yes    Call taken on August 18, 2022 at 3:53 PM by Wayne Salas

## 2022-08-23 ENCOUNTER — TRANSFERRED RECORDS (OUTPATIENT)
Dept: HEALTH INFORMATION MANAGEMENT | Facility: CLINIC | Age: 82
End: 2022-08-23

## 2022-08-23 LAB — RETINOPATHY: NEGATIVE

## 2022-09-07 ENCOUNTER — MEDICAL CORRESPONDENCE (OUTPATIENT)
Dept: HEALTH INFORMATION MANAGEMENT | Facility: CLINIC | Age: 82
End: 2022-09-07

## 2022-09-12 ENCOUNTER — DOCUMENTATION ONLY (OUTPATIENT)
Dept: FAMILY MEDICINE | Facility: CLINIC | Age: 82
End: 2022-09-12

## 2022-09-12 NOTE — PROGRESS NOTES
To be completed in Nursing note:    Please reference list for forms that require a visit for completion.  Please remind patients that providers are given 3-5 business days to complete and return forms.      Form type:  Home Health Care:  Home Health Certification and Plan of Care from 2022 TO 2022    Date form received:  2022    Date form completed by Physician:  2022    How was form returned to patient (mailed, faxed, or at  for patient to ):  fax to [phone]        Date form mailed/faxed/left at  for patient and sent to HIM for scannin2022         Once form is left for patient, faxed, or mailed PCS will then close the documentation only encounter.

## 2022-11-11 ENCOUNTER — MEDICAL CORRESPONDENCE (OUTPATIENT)
Dept: HEALTH INFORMATION MANAGEMENT | Facility: CLINIC | Age: 82
End: 2022-11-11

## 2022-11-11 ENCOUNTER — DOCUMENTATION ONLY (OUTPATIENT)
Dept: FAMILY MEDICINE | Facility: CLINIC | Age: 82
End: 2022-11-11

## 2022-11-11 DIAGNOSIS — Z53.9 DIAGNOSIS NOT YET DEFINED: Primary | ICD-10-CM

## 2022-11-11 NOTE — PROGRESS NOTES
To be completed in Nursing note:    Please reference list for forms that require a visit for completion.  Please remind patients that providers are given 3-5 business days to complete and return forms.      Form type:  [employer]:  Home Health Certification and Plan of Care from 2022 TO 2023  /  Revision to Plan of Care from 2022 TO 2022      Date form received:  2022    Date form completed by Physician:  2022    How was form returned to patient (mailed, faxed, or at  for patient to ):  fax to [phone]                     Date form mailed/faxed/left at  for patient and sent to HIM for scannin2022              Once form is left for patient, faxed, or mailed PCS will then close the documentation only encounter.

## 2022-11-16 ENCOUNTER — TRANSFERRED RECORDS (OUTPATIENT)
Dept: HEALTH INFORMATION MANAGEMENT | Facility: CLINIC | Age: 82
End: 2022-11-16

## 2022-11-16 LAB — RETINOPATHY: NEGATIVE

## 2022-11-23 ENCOUNTER — DOCUMENTATION ONLY (OUTPATIENT)
Dept: FAMILY MEDICINE | Facility: CLINIC | Age: 82
End: 2022-11-23

## 2022-11-23 DIAGNOSIS — M15.9 OSTEOARTHRITIS OF MULTIPLE JOINTS, UNSPECIFIED OSTEOARTHRITIS TYPE: ICD-10-CM

## 2022-11-23 DIAGNOSIS — U07.1 2019 NOVEL CORONAVIRUS DISEASE (COVID-19): ICD-10-CM

## 2022-11-23 NOTE — PROGRESS NOTES
To be completed in Nursing note:    Please reference list for forms that require a visit for completion.  Please remind patients that providers are given 3-5 business days to complete and return forms.      Form type:  [employer]:  Prescription/ Certificate of Medical Necessity for Gloves and Pullups     Date form received:  2022    Date form completed by Physician:  2022    How was form returned to patient (mailed, faxed, or at  for patient to ): fax to [phone]           Date form mailed/faxed/left at  for patient and sent to HIM for scannin2022         Once form is left for patient, faxed, or mailed PCS will then close the documentation only encounter.

## 2022-11-28 RX ORDER — ACETAMINOPHEN 325 MG/1
TABLET ORAL
Qty: 100 TABLET | Refills: 3 | Status: SHIPPED | OUTPATIENT
Start: 2022-11-28 | End: 2023-06-29

## 2022-12-06 ENCOUNTER — DOCUMENTATION ONLY (OUTPATIENT)
Dept: FAMILY MEDICINE | Facility: CLINIC | Age: 82
End: 2022-12-06

## 2023-01-12 ENCOUNTER — DOCUMENTATION ONLY (OUTPATIENT)
Dept: FAMILY MEDICINE | Facility: CLINIC | Age: 83
End: 2023-01-12
Payer: COMMERCIAL

## 2023-01-12 NOTE — PROGRESS NOTES
To be completed in Nursing note:    Please reference list for forms that require a visit for completion.  Please remind patients that providers are given 3-5 business days to complete and return forms.      Form type:  [employer]:  Home Health Certification and Plan of Care from 2023 TO 2023    Date form received:  2023    Date form completed by Physician:  2023    How was form returned to patient (mailed, faxed, or at  for patient to ):  fax to [phone]      Date form mailed/faxed/left at  for patient and sent to HIM for scannin2023           Once form is left for patient, faxed, or mailed PCS will then close the documentation only encounter.

## 2023-01-17 DIAGNOSIS — Z53.9 DIAGNOSIS NOT YET DEFINED: Primary | ICD-10-CM

## 2023-01-23 ENCOUNTER — OFFICE VISIT (OUTPATIENT)
Dept: FAMILY MEDICINE | Facility: CLINIC | Age: 83
End: 2023-01-23
Payer: COMMERCIAL

## 2023-01-23 VITALS
OXYGEN SATURATION: 99 % | WEIGHT: 133.6 LBS | SYSTOLIC BLOOD PRESSURE: 137 MMHG | RESPIRATION RATE: 20 BRPM | TEMPERATURE: 98.9 F | BODY MASS INDEX: 24.44 KG/M2 | DIASTOLIC BLOOD PRESSURE: 66 MMHG | HEART RATE: 88 BPM

## 2023-01-23 DIAGNOSIS — Z86.73 HISTORY OF CVA (CEREBROVASCULAR ACCIDENT): ICD-10-CM

## 2023-01-23 DIAGNOSIS — E11.69 TYPE 2 DIABETES MELLITUS WITH OTHER SPECIFIED COMPLICATION, WITHOUT LONG-TERM CURRENT USE OF INSULIN (H): Primary | ICD-10-CM

## 2023-01-23 DIAGNOSIS — Z23 ENCOUNTER FOR IMMUNIZATION: ICD-10-CM

## 2023-01-23 DIAGNOSIS — R19.5 LOOSE STOOLS: ICD-10-CM

## 2023-01-23 DIAGNOSIS — I10 ESSENTIAL HYPERTENSION WITH GOAL BLOOD PRESSURE LESS THAN 140/90: ICD-10-CM

## 2023-01-23 DIAGNOSIS — M15.9 OSTEOARTHRITIS OF MULTIPLE JOINTS, UNSPECIFIED OSTEOARTHRITIS TYPE: ICD-10-CM

## 2023-01-23 LAB
ANION GAP SERPL CALCULATED.3IONS-SCNC: 14 MMOL/L (ref 7–15)
BUN SERPL-MCNC: 11.4 MG/DL (ref 8–23)
CALCIUM SERPL-MCNC: 9.9 MG/DL (ref 8.8–10.2)
CHLORIDE SERPL-SCNC: 106 MMOL/L (ref 98–107)
CREAT SERPL-MCNC: 1.03 MG/DL (ref 0.67–1.17)
DEPRECATED HCO3 PLAS-SCNC: 21 MMOL/L (ref 22–29)
GFR SERPL CREATININE-BSD FRML MDRD: 72 ML/MIN/1.73M2
GLUCOSE SERPL-MCNC: 144 MG/DL (ref 70–99)
HBA1C MFR BLD: 6 % (ref 0–5.6)
POTASSIUM SERPL-SCNC: 4.3 MMOL/L (ref 3.4–5.3)
SODIUM SERPL-SCNC: 141 MMOL/L (ref 136–145)

## 2023-01-23 PROCEDURE — 0124A COVID-19 VACCINE BIVALENT BOOSTER 12+ (PFIZER): CPT | Performed by: FAMILY MEDICINE

## 2023-01-23 PROCEDURE — 90662 IIV NO PRSV INCREASED AG IM: CPT | Performed by: FAMILY MEDICINE

## 2023-01-23 PROCEDURE — 83036 HEMOGLOBIN GLYCOSYLATED A1C: CPT | Performed by: FAMILY MEDICINE

## 2023-01-23 PROCEDURE — 80048 BASIC METABOLIC PNL TOTAL CA: CPT | Performed by: FAMILY MEDICINE

## 2023-01-23 PROCEDURE — 99214 OFFICE O/P EST MOD 30 MIN: CPT | Mod: 25 | Performed by: FAMILY MEDICINE

## 2023-01-23 PROCEDURE — 90471 IMMUNIZATION ADMIN: CPT | Performed by: FAMILY MEDICINE

## 2023-01-23 PROCEDURE — 91312 COVID-19 VACCINE BIVALENT BOOSTER 12+ (PFIZER): CPT | Performed by: FAMILY MEDICINE

## 2023-01-23 PROCEDURE — 36415 COLL VENOUS BLD VENIPUNCTURE: CPT | Performed by: FAMILY MEDICINE

## 2023-01-24 NOTE — PROGRESS NOTES
Assessment & Plan     Diabetes mellitus, type 2 (H)  A1c checked today, and is at goal. Options reviewed with patient and family. They will make diet changes (less carbs, more protein)  - HEMOGLOBIN A1C; Future  - Basic metabolic panel; Future  - Basic metabolic panel  - HEMOGLOBIN A1C    Osteoarthritis of multiple joints, unspecified osteoarthritis type  Disability parking certificate completed. Continue conservative management    History of CVA (cerebrovascular accident)  Stable    Essential hypertension with goal blood pressure less than 140/90  At goal    Encounter for immunization    - COVID-19,PF,PFIZER BOOSTER BIVALENT (12+YRS)  - INFLUENZA, QUAD, HIGH DOSE, PF, 65YR + (FLUZONE HD)    Loose stools  Ongoing issues. 2-3 times per week. Cholestyramine powdered helped some. Will refer to GI  - Adult GI  Referral - Consult Only; Future    Diagnosis or treatment significantly limited by social determinants of health - LEP  Prescription drug management  34 minutes spent on the date of the encounter doing chart review, history and exam, documentation and further activities per the note      Kristian Muhammad MD  [facility]    Dain Hillman is a 83 year old accompanied by his son and , presenting for the following health issues:  Follow Up (Follow up to see doctor), Medication Reconciliation (Patient's son stated that everything is current and did not bring any medications ), and Forms (Need a disability parking certificate fill out )      HPI     Ongoing issues with dizzy and loose stools.  Dizzy sensation is positional, worse when upright and walking. No falls. Has assistive devices (cane mostly). Unaffected by head position.  Son monitoring BP. Systolic 140-150's. Wondering about     Review of Systems   Constitutional, HEENT, cardiovascular, pulmonary, gi and gu systems are negative, except as otherwise noted.      Objective    /66 (BP Location: Left arm, Patient  Position: Sitting, Cuff Size: Adult Regular)   Pulse 88   Temp 98.9  F (37.2  C) (Tympanic)   Resp 20   Wt 60.6 kg (133 lb 9.6 oz)   SpO2 99%   BMI 24.44 kg/m    Body mass index is 24.44 kg/m .  Physical Exam   GENERAL: healthy, alert and no distress  NECK: no adenopathy, no asymmetry, masses, or scars and thyroid normal to palpation  RESP: lungs clear to auscultation - no rales, rhonchi or wheezes  CV: regular rate and rhythm, normal S1 S2, no S3 or S4, no murmur, click or rub, no peripheral edema and peripheral pulses strong  ABDOMEN: soft, nontender, no hepatosplenomegaly, no masses and bowel sounds normal  MS: no gross musculoskeletal defects noted, no edema    Results for orders placed or performed in visit on 01/23/23   Basic metabolic panel     Status: Abnormal   Result Value Ref Range    Sodium 141 136 - 145 mmol/L    Potassium 4.3 3.4 - 5.3 mmol/L    Chloride 106 98 - 107 mmol/L    Carbon Dioxide (CO2) 21 (L) 22 - 29 mmol/L    Anion Gap 14 7 - 15 mmol/L    Urea Nitrogen 11.4 8.0 - 23.0 mg/dL    Creatinine 1.03 0.67 - 1.17 mg/dL    Calcium 9.9 8.8 - 10.2 mg/dL    Glucose 144 (H) 70 - 99 mg/dL    GFR Estimate 72 >60 mL/min/1.73m2   HEMOGLOBIN A1C     Status: Abnormal   Result Value Ref Range    Hemoglobin A1C 6.0 (H) 0.0 - 5.6 %

## 2023-01-27 ENCOUNTER — TELEPHONE (OUTPATIENT)
Dept: GASTROENTEROLOGY | Facility: CLINIC | Age: 83
End: 2023-01-27
Payer: COMMERCIAL

## 2023-01-27 NOTE — TELEPHONE ENCOUNTER
Health Call Center    Phone Message    May a detailed message be left on voicemail: yes     Reason for Call: Appointment Intake    Referring Provider Name: Kristian Muhammad MD  Diagnosis and/or Symptoms: Loose stools [R19.5]    Per triage notes, patient is to be seen as a GI Urgent, but is currently scheduled on 03/01/2023 with Ryan Vila PA-C. Current appointment is outside requested time frame. Please review for further follow up per scheduling guidelines. Thanks!     Action Taken: Message routed to:  [facility]    Travel Screening: Not Applicable

## 2023-01-27 NOTE — TELEPHONE ENCOUNTER
REFERRAL INFORMATION:    Referring Provider:  Kristian Muhammad MD    Referring Clinic:  [facility]    Reason for Visit/Diagnosis: Loose stools     FUTURE VISIT INFORMATION:    Appointment Date: 3/1/2023     NOTES STATUS DETAILS   OFFICE NOTE from Referring Provider Internal 1/23/2023, 1/3/2020 OV with TAVIA Muhammad   OFFICE NOTE from Other Specialist N/A    HOSPITAL DISCHARGE SUMMARY/  ED VISITS N/A    OPERATIVE REPORT N/A    MEDICATION LIST Internal         ENDOSCOPY  N/A    COLONOSCOPY Care Everywhere 7/6/2015 [facility]    ERCP N/A    EUS N/A    STOOL TESTING N/A    PERTINENT LABS N/A    PATHOLOGY REPORTS (RELATED) N/A    IMAGING (CT, MRI, EGD, MRCP, Small Bowel Follow Through/SBT, MR/CT Enterography) N/An

## 2023-01-31 NOTE — TELEPHONE ENCOUNTER
Called and left voice message for Pt. Called Pt to help get them scheduled with a sooner appointment. Writer left call back number.

## 2023-02-18 ENCOUNTER — MEDICAL CORRESPONDENCE (OUTPATIENT)
Dept: HEALTH INFORMATION MANAGEMENT | Facility: CLINIC | Age: 83
End: 2023-02-18
Payer: COMMERCIAL

## 2023-02-22 ENCOUNTER — DOCUMENTATION ONLY (OUTPATIENT)
Dept: FAMILY MEDICINE | Facility: CLINIC | Age: 83
End: 2023-02-22
Payer: COMMERCIAL

## 2023-02-22 NOTE — PROGRESS NOTES
To be completed in Nursing note:    Please reference list for forms that require a visit for completion.  Please remind patients that providers are given 3-5 business days to complete and return forms.      Form type:  [facility]: Revision to Plan of Care from 2023 TO 2023      Date form received:  2023    Date form completed by Physician:  2023    How was form returned to patient (mailed, faxed, or at  for patient to ):  fax to [phone]          Date form mailed/faxed/left at  for patient and sent to HIM for scannin2023             Once form is left for patient, faxed, or mailed PCS will then close the documentation only encounter.

## 2023-03-01 ENCOUNTER — PRE VISIT (OUTPATIENT)
Dept: GASTROENTEROLOGY | Facility: CLINIC | Age: 83
End: 2023-03-01

## 2023-03-02 DIAGNOSIS — Z00.00 ROUTINE GENERAL MEDICAL EXAMINATION AT A HEALTH CARE FACILITY: ICD-10-CM

## 2023-03-02 DIAGNOSIS — E11.9 TYPE 2 DIABETES MELLITUS WITHOUT COMPLICATION, WITHOUT LONG-TERM CURRENT USE OF INSULIN (H): ICD-10-CM

## 2023-03-02 DIAGNOSIS — R68.89 FORGETFULNESS: ICD-10-CM

## 2023-03-02 RX ORDER — MULTIPLE VITAMINS W/ MINERALS TAB 9MG-400MCG
1 TAB ORAL DAILY
Qty: 100 TABLET | Refills: 3 | Status: SHIPPED | OUTPATIENT
Start: 2023-03-02 | End: 2023-06-29

## 2023-03-02 NOTE — TELEPHONE ENCOUNTER
[facility] phone call message- general phone call:    Reason for call: [facility]   Asprin 81mg, daily multi vitamin, metformin 500mg,     Return call needed: Yes    OK to leave a message on voice mail? Yes    Primary language: [facility]      needed? Yes    Call taken on March 2, 2023 at 9:16 AM by Ayesha Rogers

## 2023-03-03 ENCOUNTER — TELEPHONE (OUTPATIENT)
Dept: GASTROENTEROLOGY | Facility: CLINIC | Age: 83
End: 2023-03-03
Payer: COMMERCIAL

## 2023-03-03 NOTE — TELEPHONE ENCOUNTER
Health Call Center    Phone Message    May a detailed message be left on voicemail: yes     Reason for Call: Appointment Intake    Referring Provider Name: Kristian Muhammad MD  Diagnosis and/or Symptoms: Loose stools [R19.5]    Per triage notes, patient is to be seen as a GI Urgent, but is currently scheduled on 04/20/2023 with Ryan Vila PA-C. Current appointment is outside requested time frame. Please review for further follow up per scheduling guidelines. Thanks!     Action Taken: Message routed to:  [facility]    Travel Screening: Not Applicable

## 2023-03-06 ENCOUNTER — MEDICAL CORRESPONDENCE (OUTPATIENT)
Dept: HEALTH INFORMATION MANAGEMENT | Facility: CLINIC | Age: 83
End: 2023-03-06
Payer: COMMERCIAL

## 2023-03-06 NOTE — TELEPHONE ENCOUNTER
Called and left voice message for Pt. Called Pt to help get them scheduled for a sooner GI appointment. Writer left call back number.

## 2023-03-09 ENCOUNTER — MEDICAL CORRESPONDENCE (OUTPATIENT)
Dept: HEALTH INFORMATION MANAGEMENT | Facility: CLINIC | Age: 83
End: 2023-03-09
Payer: COMMERCIAL

## 2023-03-20 ENCOUNTER — MEDICAL CORRESPONDENCE (OUTPATIENT)
Dept: HEALTH INFORMATION MANAGEMENT | Facility: CLINIC | Age: 83
End: 2023-03-20

## 2023-03-20 DIAGNOSIS — Z53.9 DIAGNOSIS NOT YET DEFINED: Primary | ICD-10-CM

## 2023-03-22 ENCOUNTER — OFFICE VISIT (OUTPATIENT)
Dept: GASTROENTEROLOGY | Facility: CLINIC | Age: 83
End: 2023-03-22
Attending: FAMILY MEDICINE
Payer: COMMERCIAL

## 2023-03-22 ENCOUNTER — ANCILLARY PROCEDURE (OUTPATIENT)
Dept: GENERAL RADIOLOGY | Facility: CLINIC | Age: 83
End: 2023-03-22
Attending: PHYSICIAN ASSISTANT
Payer: COMMERCIAL

## 2023-03-22 VITALS
SYSTOLIC BLOOD PRESSURE: 155 MMHG | HEART RATE: 64 BPM | OXYGEN SATURATION: 99 % | DIASTOLIC BLOOD PRESSURE: 70 MMHG | BODY MASS INDEX: 25.1 KG/M2 | WEIGHT: 136.4 LBS | HEIGHT: 62 IN

## 2023-03-22 DIAGNOSIS — D64.9 ANEMIA, UNSPECIFIED TYPE: Primary | ICD-10-CM

## 2023-03-22 DIAGNOSIS — D64.9 ANEMIA, UNSPECIFIED TYPE: ICD-10-CM

## 2023-03-22 DIAGNOSIS — R19.5 LOOSE STOOLS: ICD-10-CM

## 2023-03-22 DIAGNOSIS — R19.7 DIARRHEA, UNSPECIFIED TYPE: ICD-10-CM

## 2023-03-22 LAB
ALBUMIN SERPL-MCNC: 3.9 G/DL (ref 3.4–5)
ALP SERPL-CCNC: 88 U/L (ref 40–150)
ALT SERPL W P-5'-P-CCNC: 27 U/L (ref 0–70)
ANION GAP SERPL CALCULATED.3IONS-SCNC: 1 MMOL/L (ref 3–14)
AST SERPL W P-5'-P-CCNC: 20 U/L (ref 0–45)
BILIRUB DIRECT SERPL-MCNC: 0.1 MG/DL (ref 0–0.2)
BILIRUB SERPL-MCNC: 0.4 MG/DL (ref 0.2–1.3)
BUN SERPL-MCNC: 10 MG/DL (ref 7–30)
CALCIUM SERPL-MCNC: 9.3 MG/DL (ref 8.5–10.1)
CHLORIDE BLD-SCNC: 114 MMOL/L (ref 94–109)
CO2 SERPL-SCNC: 25 MMOL/L (ref 20–32)
CREAT SERPL-MCNC: 1.04 MG/DL (ref 0.66–1.25)
CRP SERPL-MCNC: <2.9 MG/L (ref 0–8)
ERYTHROCYTE [DISTWIDTH] IN BLOOD BY AUTOMATED COUNT: 15.2 % (ref 10–15)
ERYTHROCYTE [SEDIMENTATION RATE] IN BLOOD BY WESTERGREN METHOD: 28 MM/HR (ref 0–20)
GFR SERPL CREATININE-BSD FRML MDRD: 71 ML/MIN/1.73M2
GLUCOSE BLD-MCNC: 121 MG/DL (ref 70–99)
HCT VFR BLD AUTO: 37.5 % (ref 40–53)
HGB BLD-MCNC: 11.9 G/DL (ref 13.3–17.7)
LIPASE SERPL-CCNC: 188 U/L (ref 73–393)
MCH RBC QN AUTO: 29 PG (ref 26.5–33)
MCHC RBC AUTO-ENTMCNC: 31.7 G/DL (ref 31.5–36.5)
MCV RBC AUTO: 92 FL (ref 78–100)
PLATELET # BLD AUTO: 198 10E3/UL (ref 150–450)
POTASSIUM BLD-SCNC: 4.1 MMOL/L (ref 3.4–5.3)
PROT SERPL-MCNC: 8.1 G/DL (ref 6.8–8.8)
RBC # BLD AUTO: 4.1 10E6/UL (ref 4.4–5.9)
SODIUM SERPL-SCNC: 140 MMOL/L (ref 133–144)
TSH SERPL DL<=0.005 MIU/L-ACNC: 0.6 MU/L (ref 0.4–4)
WBC # BLD AUTO: 4.1 10E3/UL (ref 4–11)

## 2023-03-22 PROCEDURE — 74019 RADEX ABDOMEN 2 VIEWS: CPT | Mod: GC | Performed by: RADIOLOGY

## 2023-03-22 PROCEDURE — 83993 ASSAY FOR CALPROTECTIN FECAL: CPT | Performed by: PHYSICIAN ASSISTANT

## 2023-03-22 PROCEDURE — 85652 RBC SED RATE AUTOMATED: CPT | Performed by: PHYSICIAN ASSISTANT

## 2023-03-22 PROCEDURE — 87493 C DIFF AMPLIFIED PROBE: CPT | Performed by: PHYSICIAN ASSISTANT

## 2023-03-22 PROCEDURE — 82248 BILIRUBIN DIRECT: CPT | Performed by: PHYSICIAN ASSISTANT

## 2023-03-22 PROCEDURE — 83550 IRON BINDING TEST: CPT | Performed by: PHYSICIAN ASSISTANT

## 2023-03-22 PROCEDURE — 85027 COMPLETE CBC AUTOMATED: CPT | Performed by: PHYSICIAN ASSISTANT

## 2023-03-22 PROCEDURE — 83540 ASSAY OF IRON: CPT | Performed by: PHYSICIAN ASSISTANT

## 2023-03-22 PROCEDURE — 99204 OFFICE O/P NEW MOD 45 MIN: CPT | Performed by: PHYSICIAN ASSISTANT

## 2023-03-22 PROCEDURE — 36415 COLL VENOUS BLD VENIPUNCTURE: CPT | Performed by: PHYSICIAN ASSISTANT

## 2023-03-22 PROCEDURE — 82728 ASSAY OF FERRITIN: CPT | Performed by: PHYSICIAN ASSISTANT

## 2023-03-22 PROCEDURE — 83690 ASSAY OF LIPASE: CPT | Performed by: PHYSICIAN ASSISTANT

## 2023-03-22 PROCEDURE — 84443 ASSAY THYROID STIM HORMONE: CPT | Performed by: PHYSICIAN ASSISTANT

## 2023-03-22 PROCEDURE — 80053 COMPREHEN METABOLIC PANEL: CPT | Performed by: PHYSICIAN ASSISTANT

## 2023-03-22 PROCEDURE — 86140 C-REACTIVE PROTEIN: CPT | Performed by: PHYSICIAN ASSISTANT

## 2023-03-22 RX ORDER — CHOLESTYRAMINE 4 G/9G
1-4 POWDER, FOR SUSPENSION ORAL DAILY
Qty: 378 G | Refills: 3 | Status: CANCELLED | OUTPATIENT
Start: 2023-03-22

## 2023-03-22 ASSESSMENT — PAIN SCALES - GENERAL: PAINLEVEL: NO PAIN (0)

## 2023-03-22 NOTE — RESULT ENCOUNTER NOTE
Christiano Hillman,     X-ray of the abdomen does show evidence of moderate colonic stool burden-- I am concerned that you are having overflow diarrhea from constipation. I would recommend starting Miralax one capful daily. You can titrate this as needed, up to 3 capfuls daily. I would do this in addition to the fiber supplement we discussed at your visit.    Thanks,  Debra Neal PA-C

## 2023-03-22 NOTE — NURSING NOTE
"Chief Complaint   Patient presents with     New Patient     New consultation for loose stools     He requests these members of his care team be copied on today's visit information: pcp    PCP: Kristian Muhammad    Referring Provider:  Kristian Muhammad MD  [address] [place] [address]    Vitals:    03/22/23 1016   BP: (!) 155/70   BP Location: Left arm   Patient Position: Sitting   Cuff Size: Adult Regular   Pulse: 64   SpO2: 99%   Weight: 61.9 kg (136 lb 6.4 oz)   Height: 1.575 m (5' 2\")       Body mass index is 24.95 kg/m .    Medications were reconciled.    Does patient need any medication refills at today's visit? none      Marlena Nichols CMA     "

## 2023-03-22 NOTE — PROGRESS NOTES
GI CLINIC VISIT    CC/REFERRING MD:  Kristian Muhammad  REASON FOR CONSULTATION: diarrhea    ASSESSMENT/PLAN:  82 y/o M presents for evaluation of diarrhea which began two years ago.    #Diarrhea  Patient reports that he has had diarrhea for the last 2 years, initially when symptoms began he would generally have 2-3 episodes of diarrhea a week but this has not increased in frequency.  He describes his stools as a [place] type VI to a [place] type VII.  He states that prior to his symptoms starting 2 years ago, he had bowel movements daily generally described as a [place] type III-IV.  He denies any bright red blood per rectum.  His primary care provider started him on cholestyramine which she does take as needed, he will generally take the cholestyramine when his diarrhea starts which does help to improve the diarrhea on that day as well as the day after.  He does also report some mild abdominal cramping which is present just prior to having the bowel movement which then improved with him having a bowel movement. Differential diagnosis includes but is not limited to medication induced, celiac disease, IBD, microscopic colitis, unlikely to be infectious but considering C. difficile, IBS-D, hyperthyroidism, overflow diarrhea from constipation, etc.  We did discuss the clinical criteria for irritable bowel syndrome, likely diarrhea predominant, which it does appear patient is meeting criteria for. Will plan to obtain labs and stool studies, as well as an x-ray of his abdomen to evaluate his stool burden.  Discussed initiating a soluble fiber.  He did have improvements using cholestyramine but would like to hold off on using this while his work-up is being completed.  --obtain labs: cbc, bmp, LFTs, lipase, tsh, esr, crp, ttg, igA, stool studies: fecal nicole, c diff  --obtain x-ray of abdomen to evaluate stool burden  --please start a soluble fiber  --try to keep a food journal.  --future considerations: would consider  colonoscopy if elevated fecal calprotectin.    #GERD  Patient reports that he typically experiences symptoms of acid reflux usually 2-3 times a week, generally after eating.  He takes over-the-counter antacid medications with relief of his symptoms.  He denies that these are particularly bothersome to him.  Denies any dysphagia or odynophagia.  --continue OTC antacids PRN.        RTC 2 months.    Thank you for this consultation.  It was a pleasure to participate in the care of this patient; please contact us with any further questions.       45 minutes spent on the date of the encounter doing review of outside records, patient visit and documentation    This note was created with voice recognition software, and while reviewed for accuracy, typos may remain.    Debra Neal PA-C  Division of Gastroenterology, Hepatology and Nutrition  [facility]  82 y/o M presents for evaluation of diarrhea which began two years ago.    He is Urdu speaking, his son prefers to translate.     He notes when his symptoms first began, he would generally have 2-3 episodes of diarrhea in a week. He describes his stools as a bristol VI-VII, this has been persistent for the last two years, but has noticed for the last 6 months he has had increasing frequency of diarrhea. He will now have diarrhea 3-4 times a week. He notes that on days when he has diarrhea, he will have multiple episodes of diarrhea in the day, usually 2-3 times in the day. He notes that once he has a few episodes of diarrhea in a day, he will have a formed stool. He also reports associated abdominal pain, which he generally experiences in his lower abdomen described as a cramping, and this occurs right before having a bowel movement, and improves after he has a bowel movement. Denies BRBPR. Denies nausea or vomiting. He generally does have a bowel movement every day, but notes that the days after he has diarrhea, he  generally doesn't have a BM. Denies any recent travel. No recent ABX use. No recent GI illnesses. Cannot identify any particular inciting events prior to symptoms beginning. No hx of any abdominal surgeries. They have noticed when he has had spicy foods, this makes his symptoms worse.  His PCP started him on cholestyramine when his symptoms began -- he notes this does help his symptoms. He generally takes it after his diarrhea starts, and this helps as he does not have diarrhea after he takes this, and then he has a formed stool. He reports that he at times have issues with constipation. He notes that prior to his symptoms beginning, he would have a BM every day - and his stools were described as a bristol type IV. He does have fecal urgency, but denies fecal incontinence. Denies any abdominal bloating. Denies early satiety. He has had a normal appetite.    Patient does report he has issues with hiccups - this has been ongoing for 15 years. It does not occur frequently, but when it does occur, he will have hiccups throughout the day.   He does report a hx of GERD, which he describes as pain in his chest after eating -- he takes OTC antacids, and symptoms improve. This usually occurs a couple of times a week.     Denies f/c, chest pain, SOB. Denies dysphagia or odynophagia. Denies unintentional weight loss.     Denies alcohol use, tobacco use, or illicit drug use. Denies NSAID use.     He has been taking cymbalta for depression for the last year.         ROS:    No fevers or chills  No weight loss  No shortness of breath or wheezing  No chest pain or pressure  No odynophagia or dysphagia  No BRBPR, hematochezia, melena      PROBLEM LIST  Patient Active Problem List    Diagnosis Date Noted     Gout of foot 03/02/2017     Priority: Medium     Hypothyroidism 03/02/2017     Priority: Medium     Lumbosacral radiculopathy at S1 06/30/2016     Priority: Medium     [facility] - Dr. Miller (Gabapentin 100mg qAM and 200mg  qHS)       Obesity 09/28/2015     Priority: Medium     Diabetes mellitus, type 2 (H) 08/03/2015     Priority: Medium     A1C was 6.6 in May 2015  6.7 in September 2015    Diet and exercise       Colon polyp 07/10/2015     Priority: Medium     Colonoscopy on 7/6/15   2 mm sessile polyp  10 mm pedunculated polyp       S/P lumbar spinal fusion 06/16/2014     Priority: Medium     L4-L5 Follows with [facility] last seen on 5/19/16 - Currently on gabapentin 100 mg qAM and 200mg qHS       Back pain 02/20/2014     Priority: Medium     3-5-14 L-spine MRI: moderately severe central canal narrowing, and severe R foraminal narrowing at L4-5.       Poor vision 02/20/2014     Priority: Medium     HTN (hypertension) 12/13/2013     Priority: Medium       PERTINENT PAST MEDICAL HISTORY:    Past Medical History:   Diagnosis Date     Diabetes mellitus, type 2 (H) 8/3/2015     Gout of foot 3/2/2017     Hypertension      Hypothyroidism 3/2/2017       PREVIOUS SURGERIES:  None.  No past surgical history on file.    PREVIOUS ENDOSCOPY:  Colonoscopy (2015): polyps in cecum and sigmoid, hemorrhoids seen.    ALLERGIES:     Allergies   Allergen Reactions     Nka [No Known Allergies]        PERTINENT MEDICATIONS:    Current Outpatient Medications:      acetaminophen (TYLENOL) 325 MG tablet, TAKE 1 TO 2 TABLETS BY MOUTH EVERY 6 HOURS AS NEEDED FOR MILD PAIN, Disp: 100 tablet, Rfl: 3     acetaminophen (TYLENOL) 325 MG tablet, TAKE 1-2 TABLETS (325-650 MG) BY MOUTH EVERY 6 HOURS AS NEEDED FOR MILD PAIN, Disp: 100 tablet, Rfl: 3     allopurinol (ZYLOPRIM) 100 MG tablet, Take 1 tablet (100 mg) by mouth daily, Disp: 90 tablet, Rfl: 3     aspirin (ASPIRIN LOW DOSE) 81 MG EC tablet, Take 1 tablet (81 mg) by mouth daily, Disp: 90 tablet, Rfl: 4     atorvastatin (LIPITOR) 40 MG tablet, Take 1 tablet (40 mg) by mouth daily, Disp: 90 tablet, Rfl: 3     cholestyramine (QUESTRAN) 4 GM/DOSE powder, Take 1-4 g by mouth daily To keep stools formed and not  loose, Disp: 378 g, Rfl: 3     DULoxetine (CYMBALTA) 30 MG capsule, Take 3 capsules (90 mg) by mouth daily, Disp: 90 capsule, Rfl: 11     famotidine (PEPCID) 40 MG tablet, Take 1 tablet (40 mg) by mouth daily, Disp: 30 tablet, Rfl: 11     FEROSUL 325 (65 Fe) MG tablet, TAKE 1 TABLET (325 MG) BY MOUTH DAILY (WITH BREAKFAST), Disp: 30 tablet, Rfl: 11     gabapentin (NEURONTIN) 300 MG capsule, Take 1 capsule (300 mg) by mouth 2 times daily as needed (pain), Disp: 60 capsule, Rfl: 11     guaiFENesin (ORGANIDIN) 200 MG tablet, TAKE 1 TABLET (200 MG) BY MOUTH EVERY 4 HOURS AS NEEDED FOR COUGH, Disp: 30 tablet, Rfl: 0     levothyroxine (SYNTHROID/LEVOTHROID) 75 MCG tablet, Take 1 tablet (75 mcg) by mouth daily, Disp: 90 tablet, Rfl: 3     lisinopril (ZESTRIL) 20 MG tablet, Take 1 tablet (20 mg) by mouth daily, Disp: 90 tablet, Rfl: 3     metFORMIN (GLUCOPHAGE) 500 MG tablet, Take Two tablet (1,000mg) in morning and one tablet (500mg) in evening, Disp: 90 tablet, Rfl: 11     multivitamin w/minerals (MULTIVITAMIN, THERAPEUTIC WITH MINERALS) tablet, Take 1 tablet by mouth daily, Disp: 100 tablet, Rfl: 3    SOCIAL HISTORY:    Social History     Socioeconomic History     Marital status:      Spouse name: Not on file     Number of children: Not on file     Years of education: Not on file     Highest education level: Not on file   Occupational History     Not on file   Tobacco Use     Smoking status: Never     Smokeless tobacco: Never   Substance and Sexual Activity     Alcohol use: Never     Drug use: Never     Sexual activity: Not Currently   Other Topics Concern     Not on file   Social History Narrative     Not on file     Social Determinants of Health     Financial Resource Strain: Not on file   Food Insecurity: Not on file   Transportation Needs: Not on file   Physical Activity: Not on file   Stress: Not on file   Social Connections: Not on file   Intimate Partner Violence: Not on file   Housing Stability: Not on  file       FAMILY HISTORY:  FH of CRC: None.  FH of IBD: None.  Family History   Problem Relation Age of Onset     Cancer Brother      No Known Problems Mother      No Known Problems Father      No Known Problems Maternal Grandmother      No Known Problems Maternal Grandfather      No Known Problems Paternal Grandmother      No Known Problems Paternal Grandfather      No Known Problems Sister      No Known Problems Son      No Known Problems Daughter      No Known Problems Maternal Half-Brother      No Known Problems Maternal Half-Sister      No Known Problems Paternal Half-Brother      No Known Problems Paternal Half-Sister      No Known Problems Niece      No Known Problems Nephew      No Known Problems Cousin      No Known Problems Other      Diabetes No family hx of      Coronary Artery Disease No family hx of      Hypertension No family hx of      Hyperlipidemia No family hx of      Cerebrovascular Disease No family hx of      Breast Cancer No family hx of      Colon Cancer No family hx of      Prostate Cancer No family hx of      Other Cancer No family hx of      Depression No family hx of      Anxiety Disorder No family hx of      Mental Illness No family hx of      Substance Abuse No family hx of      Anesthesia Reaction No family hx of      Asthma No family hx of      Osteoporosis No family hx of      Genetic Disorder No family hx of      Thyroid Disease No family hx of      Obesity No family hx of      Unknown/Adopted No family hx of      Heart Disease No family hx of      Kidney Disease No family hx of      Thrombosis No family hx of      Arthritis No family hx of      Cystic Fibrosis No family hx of      Early Death No family hx of      Coronary Artery Disease Early Onset No family hx of      Heart Failure No family hx of      Bleeding Diathesis No family hx of      Dementia No family hx of      Ovarian Cancer No family hx of      Uterine Cancer No family hx of      Colorectal Cancer No family hx of       "Pancreatic Cancer No family hx of      Lung Cancer No family hx of      Melanoma No family hx of      Autoimmune Disease No family hx of        Past/family/social history reviewed and no changes    PHYSICAL EXAMINATION:  Constitutional: aaox3, cooperative, pleasant, not dyspneic/diaphoretic, no acute distress  Vitals reviewed: BP (!) 155/70 (BP Location: Left arm, Patient Position: Sitting, Cuff Size: Adult Regular)   Pulse 64   Ht 1.575 m (5' 2\")   Wt 61.9 kg (136 lb 6.4 oz)   SpO2 99%   BMI 24.95 kg/m    Wt:   Wt Readings from Last 2 Encounters:   03/22/23 61.9 kg (136 lb 6.4 oz)   01/23/23 60.6 kg (133 lb 9.6 oz)      Eyes: Sclera anicteric/injected  Respiratory: Unlabored breathing  Skin: warm, perfused, no jaundice  Psych: Normal affect  MSK: Normal gait      PERTINENT STUDIES:    Office Visit on 01/23/2023   Component Date Value Ref Range Status     Sodium 01/23/2023 141  136 - 145 mmol/L Final     Potassium 01/23/2023 4.3  3.4 - 5.3 mmol/L Final     Chloride 01/23/2023 106  98 - 107 mmol/L Final     Carbon Dioxide (CO2) 01/23/2023 21 (L)  22 - 29 mmol/L Final     Anion Gap 01/23/2023 14  7 - 15 mmol/L Final     Urea Nitrogen 01/23/2023 11.4  8.0 - 23.0 mg/dL Final     Creatinine 01/23/2023 1.03  0.67 - 1.17 mg/dL Final     Calcium 01/23/2023 9.9  8.8 - 10.2 mg/dL Final     Glucose 01/23/2023 144 (H)  70 - 99 mg/dL Final     GFR Estimate 01/23/2023 72  >60 mL/min/1.73m2 Final     Hemoglobin A1C 01/23/2023 6.0 (H)  0.0 - 5.6 % Final       "

## 2023-03-22 NOTE — PATIENT INSTRUCTIONS
It was a pleasure taking care of you today.  I've included a brief summary of our discussion and care plan from today's visit below.  Please review this information with your primary care provider.  ______________________________________________________________________    My recommendations are summarized as follows:  --please obtain labs and stool studies.  --please obtain an x-ray of your abdomen to evaluate your stool burden  --Recommend starting supplementation with a powdered soluble fiber. When used on a daily basis, this can help regulate the consistency of your stools. Metamucil (psyllium) and Citrucel are preferred examples. You can start with 1-2 teaspoons per day, with goal to gradually increase to 1 tablespoon daily. You can increase up to 1 tablespoon three times daily if needed. It is important to stay well-hydrated with use of fiber supplementation and to make sure that the fiber powder is well mixed with water as directed on the label. You may experience some bloating with initiation of fiber, which will improve over the first few weeks. A good trial to evaluate the effect is 3-6 months. Of note, many of the fiber products contain artificial sweeteners, which can cause bloating, gas, and diarrhea in those who may be sensitive to artificial sweeteners. If this is the case, recommend trying Metamucil Premium Blend (with Stevia), Metamucil 4-in-1 without Added Sweeteners, or [place]way (sweetened with [place]k fruit extract).      -- please see scheduling information provided below     Return to GI Clinic in 2 months to review your progress.    ______________________________________________________________________    How do I schedule labs, imaging studies, or procedures that were ordered in clinic today?     Labs: To schedule lab appointment at the [facility], use my chart or call [phone]. If you have a [place] lab closer to home where you are regularly  seen you can give them a call.     Procedures: If a colonoscopy, upper endoscopy, breath test, esophageal manometry, or pH impedence was ordered today, our endoscopy team will call you to schedule this. If you have not heard from our endoscopy team within a week, please call [phone] to schedule.     Imaging Studies: If you were scheduled for a CT scan, X-ray, MRI, ultrasound, HIDA scan or other imaging study, please call [phone] to have this scheduled.     Referral: If a referral to another specialty was ordered, expect a phone call or follow instructions above. If you have not heard from anyone regarding your referral in a week, please call our clinic to check the status.     Who do I call with any questions after my visit?  Please be in touch if there are any further questions that arise following today's visit.  There are multiple ways to contact your gastroenterology care team.      During business hours, you may reach a Gastroenterology nurse at [phone]    To schedule or reschedule an appointment, please call [phone].     You can always send a secure message through [employer].  [employer] messages are answered by your nurse or doctor typically within 24 hours.  Please allow extra time on weekends and holidays.      For urgent/emergent questions after business hours, you may reach the on-call GI Fellow by contacting the [facility]  at [phone].     How will I get the results of any tests ordered?    You will receive all of your results.  If you have signed up for [employer], any tests ordered at your visit will be available to you after your provider reviews them.  Typically this takes 1-2 weeks.  If there are urgent results that require a change in your care plan, your provider or nurse will call you to discuss the next steps.      What is [employer]?  [employer] is a secure way for you to access all of your healthcare records from the [facility].  It is a web based  computer program, so you can sign on to it from any location.  It also allows you to send secure messages to your care team.  I recommend signing up for [employer] access if you have not already done so and are comfortable with using a computer.      How to I schedule a follow-up visit?  If you did not schedule a follow-up visit today, please call [phone] to schedule a follow-up office visit.      Sincerely,    Debra Neal PA-C  Division of Gastroenterology, Hepatology & Nutrition  [facility]

## 2023-03-22 NOTE — LETTER
3/22/2023         RE: Rafy Riggins  [address]  [facility] [address]        Dear Colleague,    Thank you for referring your patient, Rafy Riggins, to the [facility]. Please see a copy of my visit note below.    GI CLINIC VISIT    CC/REFERRING MD:  Kristian Muhammad  REASON FOR CONSULTATION: diarrhea    ASSESSMENT/PLAN:  82 y/o M presents for evaluation of diarrhea which began two years ago.    #Diarrhea  Patient reports that he has had diarrhea for the last 2 years, initially when symptoms began he would generally have 2-3 episodes of diarrhea a week but this has not increased in frequency.  He describes his stools as a Vigo type VI to a Vigo type VII.  He states that prior to his symptoms starting 2 years ago, he had bowel movements daily generally described as a Vigo type III-IV.  He denies any bright red blood per rectum.  His primary care provider started him on cholestyramine which she does take as needed, he will generally take the cholestyramine when his diarrhea starts which does help to improve the diarrhea on that day as well as the day after.  He does also report some mild abdominal cramping which is present just prior to having the bowel movement which then improved with him having a bowel movement. Differential diagnosis includes but is not limited to medication induced, celiac disease, IBD, microscopic colitis, unlikely to be infectious but considering C. difficile, IBS-D, hyperthyroidism, overflow diarrhea from constipation, etc.  We did discuss the clinical criteria for irritable bowel syndrome, likely diarrhea predominant, which it does appear patient is meeting criteria for. Will plan to obtain labs and stool studies, as well as an x-ray of his abdomen to evaluate his stool burden.  Discussed initiating a soluble fiber.  He did have improvements using cholestyramine but would like to hold off on using this while his work-up is being  completed.  --obtain labs: cbc, bmp, LFTs, lipase, tsh, esr, crp, ttg, igA, stool studies: fecal nicole, c diff  --obtain x-ray of abdomen to evaluate stool burden  --please start a soluble fiber  --try to keep a food journal.  --future considerations: would consider colonoscopy if elevated fecal calprotectin.    #GERD  Patient reports that he typically experiences symptoms of acid reflux usually 2-3 times a week, generally after eating.  He takes over-the-counter antacid medications with relief of his symptoms.  He denies that these are particularly bothersome to him.  Denies any dysphagia or odynophagia.  --continue OTC antacids PRN.        RTC 2 months.    Thank you for this consultation.  It was a pleasure to participate in the care of this patient; please contact us with any further questions.       45 minutes spent on the date of the encounter doing review of outside records, patient visit and documentation    This note was created with voice recognition software, and while reviewed for accuracy, typos may remain.    Debra Neal PA-C  Division of Gastroenterology, Hepatology and Nutrition  [facility]  84 y/o M presents for evaluation of diarrhea which began two years ago.    He is Setswana speaking, his son prefers to translate.     He notes when his symptoms first began, he would generally have 2-3 episodes of diarrhea in a week. He describes his stools as a bristol VI-VII, this has been persistent for the last two years, but has noticed for the last 6 months he has had increasing frequency of diarrhea. He will now have diarrhea 3-4 times a week. He notes that on days when he has diarrhea, he will have multiple episodes of diarrhea in the day, usually 2-3 times in the day. He notes that once he has a few episodes of diarrhea in a day, he will have a formed stool. He also reports associated abdominal pain, which he generally experiences in his lower abdomen  described as a cramping, and this occurs right before having a bowel movement, and improves after he has a bowel movement. Denies BRBPR. Denies nausea or vomiting. He generally does have a bowel movement every day, but notes that the days after he has diarrhea, he generally doesn't have a BM. Denies any recent travel. No recent ABX use. No recent GI illnesses. Cannot identify any particular inciting events prior to symptoms beginning. No hx of any abdominal surgeries. They have noticed when he has had spicy foods, this makes his symptoms worse.  His PCP started him on cholestyramine when his symptoms began -- he notes this does help his symptoms. He generally takes it after his diarrhea starts, and this helps as he does not have diarrhea after he takes this, and then he has a formed stool. He reports that he at times have issues with constipation. He notes that prior to his symptoms beginning, he would have a BM every day - and his stools were described as a bristol type IV. He does have fecal urgency, but denies fecal incontinence. Denies any abdominal bloating. Denies early satiety. He has had a normal appetite.    Patient does report he has issues with hiccups - this has been ongoing for 15 years. It does not occur frequently, but when it does occur, he will have hiccups throughout the day.   He does report a hx of GERD, which he describes as pain in his chest after eating -- he takes OTC antacids, and symptoms improve. This usually occurs a couple of times a week.     Denies f/c, chest pain, SOB. Denies dysphagia or odynophagia. Denies unintentional weight loss.     Denies alcohol use, tobacco use, or illicit drug use. Denies NSAID use.     He has been taking cymbalta for depression for the last year.         ROS:    No fevers or chills  No weight loss  No shortness of breath or wheezing  No chest pain or pressure  No odynophagia or dysphagia  No BRBPR, hematochezia, melena      PROBLEM LIST  Patient Active  Problem List    Diagnosis Date Noted     Gout of foot 03/02/2017     Priority: Medium     Hypothyroidism 03/02/2017     Priority: Medium     Lumbosacral radiculopathy at S1 06/30/2016     Priority: Medium     [facility] - Dr. Miller (Gabapentin 100mg qAM and 200mg qHS)       Obesity 09/28/2015     Priority: Medium     Diabetes mellitus, type 2 (H) 08/03/2015     Priority: Medium     A1C was 6.6 in May 2015  6.7 in September 2015    Diet and exercise       Colon polyp 07/10/2015     Priority: Medium     Colonoscopy on 7/6/15   2 mm sessile polyp  10 mm pedunculated polyp       S/P lumbar spinal fusion 06/16/2014     Priority: Medium     L4-L5 Follows with [facility] last seen on 5/19/16 - Currently on gabapentin 100 mg qAM and 200mg qHS       Back pain 02/20/2014     Priority: Medium     3-5-14 L-spine MRI: moderately severe central canal narrowing, and severe R foraminal narrowing at L4-5.       Poor vision 02/20/2014     Priority: Medium     HTN (hypertension) 12/13/2013     Priority: Medium       PERTINENT PAST MEDICAL HISTORY:    Past Medical History:   Diagnosis Date     Diabetes mellitus, type 2 (H) 8/3/2015     Gout of foot 3/2/2017     Hypertension      Hypothyroidism 3/2/2017       PREVIOUS SURGERIES:  None.  No past surgical history on file.    PREVIOUS ENDOSCOPY:  Colonoscopy (2015): polyps in cecum and sigmoid, hemorrhoids seen.    ALLERGIES:     Allergies   Allergen Reactions     Nka [No Known Allergies]        PERTINENT MEDICATIONS:    Current Outpatient Medications:      acetaminophen (TYLENOL) 325 MG tablet, TAKE 1 TO 2 TABLETS BY MOUTH EVERY 6 HOURS AS NEEDED FOR MILD PAIN, Disp: 100 tablet, Rfl: 3     acetaminophen (TYLENOL) 325 MG tablet, TAKE 1-2 TABLETS (325-650 MG) BY MOUTH EVERY 6 HOURS AS NEEDED FOR MILD PAIN, Disp: 100 tablet, Rfl: 3     allopurinol (ZYLOPRIM) 100 MG tablet, Take 1 tablet (100 mg) by mouth daily, Disp: 90 tablet, Rfl: 3     aspirin (ASPIRIN LOW DOSE) 81 MG EC tablet, Take 1  tablet (81 mg) by mouth daily, Disp: 90 tablet, Rfl: 4     atorvastatin (LIPITOR) 40 MG tablet, Take 1 tablet (40 mg) by mouth daily, Disp: 90 tablet, Rfl: 3     cholestyramine (QUESTRAN) 4 GM/DOSE powder, Take 1-4 g by mouth daily To keep stools formed and not loose, Disp: 378 g, Rfl: 3     DULoxetine (CYMBALTA) 30 MG capsule, Take 3 capsules (90 mg) by mouth daily, Disp: 90 capsule, Rfl: 11     famotidine (PEPCID) 40 MG tablet, Take 1 tablet (40 mg) by mouth daily, Disp: 30 tablet, Rfl: 11     FEROSUL 325 (65 Fe) MG tablet, TAKE 1 TABLET (325 MG) BY MOUTH DAILY (WITH BREAKFAST), Disp: 30 tablet, Rfl: 11     gabapentin (NEURONTIN) 300 MG capsule, Take 1 capsule (300 mg) by mouth 2 times daily as needed (pain), Disp: 60 capsule, Rfl: 11     guaiFENesin (ORGANIDIN) 200 MG tablet, TAKE 1 TABLET (200 MG) BY MOUTH EVERY 4 HOURS AS NEEDED FOR COUGH, Disp: 30 tablet, Rfl: 0     levothyroxine (SYNTHROID/LEVOTHROID) 75 MCG tablet, Take 1 tablet (75 mcg) by mouth daily, Disp: 90 tablet, Rfl: 3     lisinopril (ZESTRIL) 20 MG tablet, Take 1 tablet (20 mg) by mouth daily, Disp: 90 tablet, Rfl: 3     metFORMIN (GLUCOPHAGE) 500 MG tablet, Take Two tablet (1,000mg) in morning and one tablet (500mg) in evening, Disp: 90 tablet, Rfl: 11     multivitamin w/minerals (MULTIVITAMIN, THERAPEUTIC WITH MINERALS) tablet, Take 1 tablet by mouth daily, Disp: 100 tablet, Rfl: 3    SOCIAL HISTORY:    Social History     Socioeconomic History     Marital status:      Spouse name: Not on file     Number of children: Not on file     Years of education: Not on file     Highest education level: Not on file   Occupational History     Not on file   Tobacco Use     Smoking status: Never     Smokeless tobacco: Never   Substance and Sexual Activity     Alcohol use: Never     Drug use: Never     Sexual activity: Not Currently   Other Topics Concern     Not on file   Social History Narrative     Not on file     Social Determinants of Health      Financial Resource Strain: Not on file   Food Insecurity: Not on file   Transportation Needs: Not on file   Physical Activity: Not on file   Stress: Not on file   Social Connections: Not on file   Intimate Partner Violence: Not on file   Housing Stability: Not on file       FAMILY HISTORY:  FH of CRC: None.  FH of IBD: None.  Family History   Problem Relation Age of Onset     Cancer Brother      No Known Problems Mother      No Known Problems Father      No Known Problems Maternal Grandmother      No Known Problems Maternal Grandfather      No Known Problems Paternal Grandmother      No Known Problems Paternal Grandfather      No Known Problems Sister      No Known Problems Son      No Known Problems Daughter      No Known Problems Maternal Half-Brother      No Known Problems Maternal Half-Sister      No Known Problems Paternal Half-Brother      No Known Problems Paternal Half-Sister      No Known Problems Niece      No Known Problems Nephew      No Known Problems Cousin      No Known Problems Other      Diabetes No family hx of      Coronary Artery Disease No family hx of      Hypertension No family hx of      Hyperlipidemia No family hx of      Cerebrovascular Disease No family hx of      Breast Cancer No family hx of      Colon Cancer No family hx of      Prostate Cancer No family hx of      Other Cancer No family hx of      Depression No family hx of      Anxiety Disorder No family hx of      Mental Illness No family hx of      Substance Abuse No family hx of      Anesthesia Reaction No family hx of      Asthma No family hx of      Osteoporosis No family hx of      Genetic Disorder No family hx of      Thyroid Disease No family hx of      Obesity No family hx of      Unknown/Adopted No family hx of      Heart Disease No family hx of      Kidney Disease No family hx of      Thrombosis No family hx of      Arthritis No family hx of      Cystic Fibrosis No family hx of      Early Death No family hx of       "Coronary Artery Disease Early Onset No family hx of      Heart Failure No family hx of      Bleeding Diathesis No family hx of      Dementia No family hx of      Ovarian Cancer No family hx of      Uterine Cancer No family hx of      Colorectal Cancer No family hx of      Pancreatic Cancer No family hx of      Lung Cancer No family hx of      Melanoma No family hx of      Autoimmune Disease No family hx of        Past/family/social history reviewed and no changes    PHYSICAL EXAMINATION:  Constitutional: aaox3, cooperative, pleasant, not dyspneic/diaphoretic, no acute distress  Vitals reviewed: BP (!) 155/70 (BP Location: Left arm, Patient Position: Sitting, Cuff Size: Adult Regular)   Pulse 64   Ht 1.575 m (5' 2\")   Wt 61.9 kg (136 lb 6.4 oz)   SpO2 99%   BMI 24.95 kg/m    Wt:   Wt Readings from Last 2 Encounters:   03/22/23 61.9 kg (136 lb 6.4 oz)   01/23/23 60.6 kg (133 lb 9.6 oz)      Eyes: Sclera anicteric/injected  Respiratory: Unlabored breathing  Skin: warm, perfused, no jaundice  Psych: Normal affect  MSK: Normal gait      PERTINENT STUDIES:    Office Visit on 01/23/2023   Component Date Value Ref Range Status     Sodium 01/23/2023 141  136 - 145 mmol/L Final     Potassium 01/23/2023 4.3  3.4 - 5.3 mmol/L Final     Chloride 01/23/2023 106  98 - 107 mmol/L Final     Carbon Dioxide (CO2) 01/23/2023 21 (L)  22 - 29 mmol/L Final     Anion Gap 01/23/2023 14  7 - 15 mmol/L Final     Urea Nitrogen 01/23/2023 11.4  8.0 - 23.0 mg/dL Final     Creatinine 01/23/2023 1.03  0.67 - 1.17 mg/dL Final     Calcium 01/23/2023 9.9  8.8 - 10.2 mg/dL Final     Glucose 01/23/2023 144 (H)  70 - 99 mg/dL Final     GFR Estimate 01/23/2023 72  >60 mL/min/1.73m2 Final     Hemoglobin A1C 01/23/2023 6.0 (H)  0.0 - 5.6 % Final           Again, thank you for allowing me to participate in the care of your patient.        Sincerely,        Debra Neal PA-C    "

## 2023-03-22 NOTE — LETTER
March 30, 2023      [facility]  [address] [facility] [address]        Dear Mr.Pokwal Riggins,    We are writing to inform you of your test results.    Your iron studies were normal.  Your stool studies were also normal. No evidence of a bacterial infection, known as clostridium difficile.     If you have any questions or concerns, please call the clinic at the number listed above.       Sincerely,      Debra Neal PA-C                                          Resulted Orders   CBC with platelets   Result Value Ref Range    WBC Count 4.1 4.0 - 11.0 10e3/uL    RBC Count 4.10 (L) 4.40 - 5.90 10e6/uL    Hemoglobin 11.9 (L) 13.3 - 17.7 g/dL    Hematocrit 37.5 (L) 40.0 - 53.0 %    MCV 92 78 - 100 fL    MCH 29.0 26.5 - 33.0 pg    MCHC 31.7 31.5 - 36.5 g/dL    RDW 15.2 (H) 10.0 - 15.0 %    Platelet Count 198 150 - 450 10e3/uL   Basic metabolic panel  (Ca, Cl, CO2, Creat, Gluc, K, Na, BUN)   Result Value Ref Range    Sodium 140 133 - 144 mmol/L    Potassium 4.1 3.4 - 5.3 mmol/L    Chloride 114 (H) 94 - 109 mmol/L    Carbon Dioxide (CO2) 25 20 - 32 mmol/L    Anion Gap 1 (L) 3 - 14 mmol/L    Urea Nitrogen 10 7 - 30 mg/dL    Creatinine 1.04 0.66 - 1.25 mg/dL    Calcium 9.3 8.5 - 10.1 mg/dL    Glucose 121 (H) 70 - 99 mg/dL    GFR Estimate 71 >60 mL/min/1.73m2      Comment:      eGFR calculated using 2021 CKD-EPI equation.   Hepatic panel (Albumin, ALT, AST, Bili, Alk Phos, TP)   Result Value Ref Range    Bilirubin Total 0.4 0.2 - 1.3 mg/dL    Bilirubin Direct 0.1 0.0 - 0.2 mg/dL    Protein Total 8.1 6.8 - 8.8 g/dL    Albumin 3.9 3.4 - 5.0 g/dL    Alkaline Phosphatase 88 40 - 150 U/L    AST 20 0 - 45 U/L    ALT 27 0 - 70 U/L   CRP, inflammation   Result Value Ref Range    CRP Inflammation <2.9 0.0 - 8.0 mg/L   ESR: Erythrocyte sedimentation rate   Result Value Ref Range    Erythrocyte Sedimentation Rate 28 (H) 0 - 20 mm/hr   TSH with free T4 reflex   Result Value Ref Range    TSH 0.60 0.40 - 4.00 mU/L    Calprotectin Feces   Result Value Ref Range    Calprotectin Feces 20.7 0.0 - 49.9 mg/kg      Comment:      Normal   C. difficile Toxin B PCR with reflex to C. difficile Antigen and Toxins A/B EIA   Result Value Ref Range    C Difficile Toxin B by PCR Negative Negative      Comment:      A negative result does not exclude actual disease due to C. difficile and may be due to improper collection, handling and storage of the specimen or the number of organisms in the specimen is below the detection limit of the assay.    Narrative    The [employer] Xpert C. difficile Assay, performed on the [employer]  Instrument Systems, is a qualitative in vitro diagnostic test for rapid detection of toxin B gene sequences from unformed (liquid or soft) stool specimens collected from patients suspected of having Clostridioides difficile infection (CDI). The test utilizes automated real-time polymerase chain reaction (PCR) to detect toxin gene sequences associated with toxin producing C. difficile. The Xpert C. difficile Assay is intended as an aid in the diagnosis of CDI.   Lipase   Result Value Ref Range    Lipase 188 73 - 393 U/L   Ferritin   Result Value Ref Range    Ferritin 31 26 - 388 ng/mL   Iron and iron binding capacity   Result Value Ref Range    Iron 46 35 - 180 ug/dL    Iron Binding Capacity 296 240 - 430 ug/dL    Iron Sat Index 16 15 - 46 %

## 2023-03-23 ENCOUNTER — TELEPHONE (OUTPATIENT)
Dept: GASTROENTEROLOGY | Facility: CLINIC | Age: 83
End: 2023-03-23
Payer: COMMERCIAL

## 2023-03-23 DIAGNOSIS — D64.9 ANEMIA, UNSPECIFIED TYPE: Primary | ICD-10-CM

## 2023-03-23 LAB
FERRITIN SERPL-MCNC: 31 NG/ML (ref 26–388)
IRON SATN MFR SERPL: 16 % (ref 15–46)
IRON SERPL-MCNC: 46 UG/DL (ref 35–180)
TIBC SERPL-MCNC: 296 UG/DL (ref 240–430)

## 2023-03-23 NOTE — TELEPHONE ENCOUNTER
I called patient's son Collin - who was present at the visit yesterday and asked us to communicate results through him. There is evidence of anemia, that has been present for the last 3 years. I added on iron labs to evaluate further. We also discussed that one of his inflammatory markers was mildly elevated as well, so if they can submit the stool studies when they can that would be helpful. We discussed the results of the abdominal x-ray as well, which was concerning for moderate stool burden. I recommended he start miralax one capful daily.

## 2023-03-24 DIAGNOSIS — Z53.9 DIAGNOSIS NOT YET DEFINED: Primary | ICD-10-CM

## 2023-03-27 ENCOUNTER — LAB (OUTPATIENT)
Dept: LAB | Facility: CLINIC | Age: 83
End: 2023-03-27
Payer: COMMERCIAL

## 2023-03-27 DIAGNOSIS — R19.7 DIARRHEA, UNSPECIFIED TYPE: ICD-10-CM

## 2023-03-27 DIAGNOSIS — D64.9 ANEMIA, UNSPECIFIED TYPE: ICD-10-CM

## 2023-03-28 LAB — C DIFF TOX B STL QL: NEGATIVE

## 2023-03-29 LAB — CALPROTECTIN STL-MCNT: 20.7 MG/KG (ref 0–49.9)

## 2023-04-26 DIAGNOSIS — M15.9 OSTEOARTHRITIS OF MULTIPLE JOINTS, UNSPECIFIED OSTEOARTHRITIS TYPE: ICD-10-CM

## 2023-04-26 DIAGNOSIS — R12 HEARTBURN: ICD-10-CM

## 2023-04-26 DIAGNOSIS — E03.9 HYPOTHYROIDISM, UNSPECIFIED TYPE: ICD-10-CM

## 2023-04-26 DIAGNOSIS — M1A.9XX0 CHRONIC GOUT WITHOUT TOPHUS, UNSPECIFIED CAUSE, UNSPECIFIED SITE: ICD-10-CM

## 2023-04-26 RX ORDER — DULOXETIN HYDROCHLORIDE 30 MG/1
CAPSULE, DELAYED RELEASE ORAL
Qty: 90 CAPSULE | Refills: 11 | Status: SHIPPED | OUTPATIENT
Start: 2023-04-26 | End: 2023-06-29

## 2023-04-26 RX ORDER — LEVOTHYROXINE SODIUM 75 UG/1
TABLET ORAL
Qty: 90 TABLET | Refills: 3 | Status: SHIPPED | OUTPATIENT
Start: 2023-04-26 | End: 2023-06-29

## 2023-04-26 RX ORDER — GABAPENTIN 300 MG/1
CAPSULE ORAL
Qty: 60 CAPSULE | Refills: 11 | Status: SHIPPED | OUTPATIENT
Start: 2023-04-26 | End: 2023-06-29

## 2023-04-26 RX ORDER — ALLOPURINOL 100 MG/1
TABLET ORAL
Qty: 90 TABLET | Refills: 3 | Status: SHIPPED | OUTPATIENT
Start: 2023-04-26 | End: 2023-06-29

## 2023-04-26 RX ORDER — FAMOTIDINE 40 MG/1
TABLET, FILM COATED ORAL
Qty: 30 TABLET | Refills: 11 | Status: SHIPPED | OUTPATIENT
Start: 2023-04-26 | End: 2023-12-07

## 2023-05-09 ENCOUNTER — DOCUMENTATION ONLY (OUTPATIENT)
Dept: FAMILY MEDICINE | Facility: CLINIC | Age: 83
End: 2023-05-09
Payer: COMMERCIAL

## 2023-05-09 NOTE — PROGRESS NOTES
To be completed in Nursing note:    Please reference list for forms that require a visit for completion.  Please remind patients that providers are given 3-5 business days to complete and return forms.      Form type: Home Health Certification and Plan of Care 2023 to 2023    Date form received: 23    Date form completed by Physician: Dr. Muhammad will be in clinic on 23    How was form returned to patient (mailed, faxed, or at  for patient to ): Fax back at [phone]    Date form mailed/faxed/left at  for patient and sent to HIM for [email or web address] faxed      Once form is left for patient, faxed, or mailed PCS will then close the documentation only encounter.           Ivonne Kim MA

## 2023-05-11 DIAGNOSIS — Z53.9 DIAGNOSIS NOT YET DEFINED: Primary | ICD-10-CM

## 2023-05-26 DIAGNOSIS — I10 ESSENTIAL HYPERTENSION WITH GOAL BLOOD PRESSURE LESS THAN 140/90: ICD-10-CM

## 2023-05-26 DIAGNOSIS — E78.5 HYPERLIPIDEMIA LDL GOAL <100: ICD-10-CM

## 2023-05-26 RX ORDER — LISINOPRIL 20 MG/1
TABLET ORAL
Qty: 90 TABLET | Refills: 3 | Status: SHIPPED | OUTPATIENT
Start: 2023-05-26 | End: 2023-06-29

## 2023-05-26 RX ORDER — ATORVASTATIN CALCIUM 40 MG/1
TABLET, FILM COATED ORAL
Qty: 90 TABLET | Refills: 3 | Status: SHIPPED | OUTPATIENT
Start: 2023-05-26 | End: 2023-06-29

## 2023-05-31 ENCOUNTER — DOCUMENTATION ONLY (OUTPATIENT)
Dept: FAMILY MEDICINE | Facility: CLINIC | Age: 83
End: 2023-05-31
Payer: COMMERCIAL

## 2023-05-31 NOTE — PROGRESS NOTES
To be completed in Nursing note:    Please reference list for forms that require a visit for completion.  Please remind patients that providers are given 3-5 business days to complete and return forms.      Form type:Home Health Care: Revision to Plan of Care    Date form received: 5/30/2023    Date form completed by Physician:5/31/2023    How was form returned to patient (mailed, faxed, or at  for patient to ): Faxed back to     Date form mailed/faxed/left at  for patient and sent to HIM for scanning:Faxed on 5/31/2023      Once form is left for patient, faxed, or mailed PCS will then close the documentation only encounter.       Ivonne Kim MA

## 2023-06-02 ENCOUNTER — MEDICAL CORRESPONDENCE (OUTPATIENT)
Dept: HEALTH INFORMATION MANAGEMENT | Facility: CLINIC | Age: 83
End: 2023-06-02
Payer: COMMERCIAL

## 2023-06-29 ENCOUNTER — OFFICE VISIT (OUTPATIENT)
Dept: FAMILY MEDICINE | Facility: CLINIC | Age: 83
End: 2023-06-29
Payer: COMMERCIAL

## 2023-06-29 VITALS — SYSTOLIC BLOOD PRESSURE: 116 MMHG | HEART RATE: 82 BPM | DIASTOLIC BLOOD PRESSURE: 72 MMHG | TEMPERATURE: 98.1 F

## 2023-06-29 DIAGNOSIS — Z00.00 ROUTINE GENERAL MEDICAL EXAMINATION AT A HEALTH CARE FACILITY: ICD-10-CM

## 2023-06-29 DIAGNOSIS — E03.9 HYPOTHYROIDISM, UNSPECIFIED TYPE: ICD-10-CM

## 2023-06-29 DIAGNOSIS — M1A.9XX0 CHRONIC GOUT WITHOUT TOPHUS, UNSPECIFIED CAUSE, UNSPECIFIED SITE: ICD-10-CM

## 2023-06-29 DIAGNOSIS — M15.9 OSTEOARTHRITIS OF MULTIPLE JOINTS, UNSPECIFIED OSTEOARTHRITIS TYPE: ICD-10-CM

## 2023-06-29 DIAGNOSIS — H90.6 MIXED HEARING LOSS, BILATERAL: ICD-10-CM

## 2023-06-29 DIAGNOSIS — E11.69 TYPE 2 DIABETES MELLITUS WITH OTHER SPECIFIED COMPLICATION, WITHOUT LONG-TERM CURRENT USE OF INSULIN (H): ICD-10-CM

## 2023-06-29 DIAGNOSIS — R68.89 FORGETFULNESS: ICD-10-CM

## 2023-06-29 DIAGNOSIS — E78.5 HYPERLIPIDEMIA LDL GOAL <100: ICD-10-CM

## 2023-06-29 DIAGNOSIS — U07.1 2019 NOVEL CORONAVIRUS DISEASE (COVID-19): ICD-10-CM

## 2023-06-29 DIAGNOSIS — E11.9 TYPE 2 DIABETES MELLITUS WITHOUT COMPLICATION, WITHOUT LONG-TERM CURRENT USE OF INSULIN (H): ICD-10-CM

## 2023-06-29 DIAGNOSIS — I10 ESSENTIAL HYPERTENSION WITH GOAL BLOOD PRESSURE LESS THAN 140/90: Primary | ICD-10-CM

## 2023-06-29 LAB
CHOLEST SERPL-MCNC: 184 MG/DL
CREAT UR-MCNC: 92.6 MG/DL
HDLC SERPL-MCNC: 44 MG/DL
LDLC SERPL CALC-MCNC: 114 MG/DL
MICROALBUMIN UR-MCNC: <12 MG/L
MICROALBUMIN/CREAT UR: NORMAL MG/G{CREAT}
NONHDLC SERPL-MCNC: 140 MG/DL
TRIGL SERPL-MCNC: 130 MG/DL

## 2023-06-29 PROCEDURE — 36415 COLL VENOUS BLD VENIPUNCTURE: CPT | Performed by: FAMILY MEDICINE

## 2023-06-29 PROCEDURE — 80061 LIPID PANEL: CPT | Performed by: FAMILY MEDICINE

## 2023-06-29 PROCEDURE — 82570 ASSAY OF URINE CREATININE: CPT | Performed by: FAMILY MEDICINE

## 2023-06-29 PROCEDURE — 99214 OFFICE O/P EST MOD 30 MIN: CPT | Performed by: FAMILY MEDICINE

## 2023-06-29 PROCEDURE — 82043 UR ALBUMIN QUANTITATIVE: CPT | Performed by: FAMILY MEDICINE

## 2023-06-29 RX ORDER — ACETAMINOPHEN 325 MG/1
TABLET ORAL
Qty: 100 TABLET | Refills: 3 | Status: SHIPPED | OUTPATIENT
Start: 2023-06-29 | End: 2023-12-07

## 2023-06-29 RX ORDER — MULTIPLE VITAMINS W/ MINERALS TAB 9MG-400MCG
1 TAB ORAL DAILY
Qty: 100 TABLET | Refills: 3 | Status: SHIPPED | OUTPATIENT
Start: 2023-06-29 | End: 2023-12-07

## 2023-06-29 RX ORDER — DULOXETIN HYDROCHLORIDE 30 MG/1
CAPSULE, DELAYED RELEASE ORAL
Qty: 90 CAPSULE | Refills: 11 | Status: SHIPPED | OUTPATIENT
Start: 2023-06-29 | End: 2023-12-07

## 2023-06-29 RX ORDER — ALLOPURINOL 100 MG/1
100 TABLET ORAL DAILY
Qty: 90 TABLET | Refills: 3 | Status: SHIPPED | OUTPATIENT
Start: 2023-06-29 | End: 2023-12-07

## 2023-06-29 RX ORDER — ATORVASTATIN CALCIUM 40 MG/1
40 TABLET, FILM COATED ORAL DAILY
Qty: 90 TABLET | Refills: 3 | Status: SHIPPED | OUTPATIENT
Start: 2023-06-29 | End: 2023-12-07

## 2023-06-29 RX ORDER — LOSARTAN POTASSIUM 50 MG/1
50 TABLET ORAL DAILY
Qty: 90 TABLET | Refills: 3 | Status: SHIPPED | OUTPATIENT
Start: 2023-06-29 | End: 2023-12-07

## 2023-06-29 RX ORDER — GABAPENTIN 300 MG/1
CAPSULE ORAL
Qty: 60 CAPSULE | Refills: 11 | Status: SHIPPED | OUTPATIENT
Start: 2023-06-29 | End: 2023-12-07

## 2023-06-29 RX ORDER — LEVOTHYROXINE SODIUM 75 UG/1
75 TABLET ORAL DAILY
Qty: 90 TABLET | Refills: 3 | Status: SHIPPED | OUTPATIENT
Start: 2023-06-29 | End: 2023-12-07

## 2023-06-29 RX ORDER — ASPIRIN 81 MG/1
81 TABLET ORAL DAILY
Qty: 90 TABLET | Refills: 3 | Status: SHIPPED | OUTPATIENT
Start: 2023-06-29 | End: 2023-12-07

## 2023-06-29 NOTE — PROGRESS NOTES
Assessment & Plan     Diabetes mellitus, type 2 (H)  Ongoing dizzy. Cough.    1) Stop lisinopril (might be causing cough)  Start Losartan    2) Refer to Audiology for hearing issues.    3) Monitor Blood Pressure.  IF TOP number in 90's to 110's range; we may reduce dose of losartan    4) [facility] if NOT better after addressing hearing and home BP are.  - Lipid panel reflex to direct LDL Non-fasting; Future  - Albumin Random Urine Quantitative with Creat Ratio; Future  - Lipid panel reflex to direct LDL Non-fasting  - Albumin Random Urine Quantitative with Creat Ratio    Essential hypertension with goal blood pressure less than 140/90    - Home Blood Pressure Monitor Order for DME - ONLY FOR DME    Hypothyroidism, unspecified type    - levothyroxine (SYNTHROID/LEVOTHROID) 75 MCG tablet; Take 1 tablet (75 mcg) by mouth daily    Chronic gout without tophus, unspecified cause, unspecified site    - allopurinol (ZYLOPRIM) 100 MG tablet; Take 1 tablet (100 mg) by mouth daily    2019 novel coronavirus disease (COVID-19)    - acetaminophen (TYLENOL) 325 MG tablet; TAKE 1 TO 2 TABLETS BY MOUTH EVERY 6 HOURS AS NEEDED FOR MILD PAIN    Osteoarthritis of multiple joints, unspecified osteoarthritis type    - acetaminophen (TYLENOL) 325 MG tablet; TAKE 1 TO 2 TABLETS BY MOUTH EVERY 6 HOURS AS NEEDED FOR MILD PAIN  - DULoxetine (CYMBALTA) 30 MG capsule; TAKE 3 CAPSULES(90 MG) BY MOUTH DAILY  - gabapentin (NEURONTIN) 300 MG capsule; TAKE 1 CAPSULE(300 MG) BY MOUTH TWICE DAILY AS NEEDED FOR PAIN    Type 2 diabetes mellitus with other specified complication, without long-term current use of insulin (H)      Forgetfulness    - multivitamin w/minerals (MULTIVITAMIN, THERAPEUTIC WITH MINERALS) tablet; Take 1 tablet by mouth daily    Hyperlipidemia LDL goal <100    - atorvastatin (LIPITOR) 40 MG tablet; Take 1 tablet (40 mg) by mouth daily    Routine general medical examination at a health care facility    -  aspirin 81 MG EC tablet; Take 1 tablet (81 mg) by mouth daily    Type 2 diabetes mellitus without complication, without long-term current use of insulin (H)    - losartan (COZAAR) 50 MG tablet; Take 1 tablet (50 mg) by mouth daily    Diagnosis or treatment significantly limited by social determinants of health - LEP  Prescription drug management  33 minutes spent by me on the date of the encounter doing chart review, history and exam, documentation and further activities per the note      Kristian Muhammad MD  [facility]    Dain Hillman is a 83 year old, presenting for the following health issues:  Hypertension (CK HIGH BP) and OTHER (He will like a hearing aid..... Will like to talk about traveling, he is traveling out of the country  on October 10th, 2023)        6/29/2023     9:52 AM   Additional Questions   Roomed by Ivonne   Accompanied by son     HPI     Ongoing dizzy. Cough.  Hearing has decreased in both ears gradually over the years.  Wondering about hearing aid      Review of Systems   Constitutional, HEENT, cardiovascular, pulmonary, gi and gu systems are negative, except as otherwise noted.      Objective    /72   Pulse 82   Temp 98.1  F (36.7  C) (Oral)   There is no height or weight on file to calculate BMI.  Physical Exam   GENERAL: healthy, alert and no distress  NECK: no adenopathy, no asymmetry, masses, or scars and thyroid normal to palpation  RESP: lungs clear to auscultation - no rales, rhonchi or wheezes  CV: regular rate and rhythm, normal S1 S2, no S3 or S4, no murmur, click or rub, no peripheral edema and peripheral pulses strong  ABDOMEN: soft, nontender, no hepatosplenomegaly, no masses and bowel sounds normal  MS: no gross musculoskeletal defects noted, no edema

## 2023-06-29 NOTE — PATIENT INSTRUCTIONS
Ongoing dizzy. Cough.    1) Stop lisinopril (might be causing cough)  Start Losartan    2) Refer to Audiology for hearing issues.    3) Monitor Blood Pressure.  IF TOP number in 90's to 110's range; we may reduce dose of losartan    4) [facility] if NOT better after addressing hearing and home BP are.

## 2023-06-29 NOTE — LETTER
July 13, 2023      [facility]afy [facility]iggins  [address] [facility] [address]        Dear Mr.Pokwal Riggins,    We are writing to inform you of your test results.    Blood and urine tests look good. Continue your present medication. We can discuss results further at your next appointment.    Resulted Orders   Lipid panel reflex to direct LDL Non-fasting   Result Value Ref Range    Cholesterol 184 <200 mg/dL    Triglycerides 130 <150 mg/dL    Direct Measure HDL 44 >=40 mg/dL    LDL Cholesterol Calculated 114 (H) <=100 mg/dL    Non HDL Cholesterol 140 (H) <130 mg/dL    Narrative    Cholesterol  Desirable:  <200 mg/dL    Triglycerides  Normal:  Less than 150 mg/dL  Borderline High:  150-199 mg/dL  High:  200-499 mg/dL  Very High:  Greater than or equal to 500 mg/dL    Direct Measure HDL  Female:  Greater than or equal to 50 mg/dL   Male:  Greater than or equal to 40 mg/dL    LDL Cholesterol  Desirable:  <100mg/dL  Above Desirable:  100-129 mg/dL   Borderline High:  130-159 mg/dL   High:  160-189 mg/dL   Very High:  >= 190 mg/dL    Non HDL Cholesterol  Desirable:  130 mg/dL  Above Desirable:  130-159 mg/dL  Borderline High:  160-189 mg/dL  High:  190-219 mg/dL  Very High:  Greater than or equal to 220 mg/dL   Albumin Random Urine Quantitative with Creat Ratio   Result Value Ref Range    Creatinine Urine mg/dL 92.6 mg/dL      Comment:      The reference ranges have not been established in urine creatinine. The results should be integrated into the clinical context for interpretation.    Albumin Urine mg/L <12.0 mg/L      Comment:      The reference ranges have not been established in urine albumin. The results should be integrated into the clinical context for interpretation.    Albumin Urine mg/g Cr        Comment:      Unable to calculate, urine albumin and/or urine creatinine is outside detectable limits.  Microalbuminuria is defined as an albumin:creatinine ratio of 17 to 299 for males and 25 to 299 for females. A  ratio of albumin:creatinine of 300 or higher is indicative of overt proteinuria.  Due to biologic variability, positive results should be confirmed by a second, first-morning random or 24-hour timed urine specimen. If there is discrepancy, a third specimen is recommended. When 2 out of 3 results are in the microalbuminuria range, this is evidence for incipient nephropathy and warrants increased efforts at glucose control, blood pressure control, and institution of therapy with an angiotensin-converting-enzyme (ACE) inhibitor (if the patient can tolerate it).         If you have any questions or concerns, please call the clinic at the number listed above.       Sincerely,      Kristian Muhammad MD

## 2023-06-29 NOTE — LETTER
July 13, 2023      [facility]afy [facility]iggins  [address] [facility] [address]        Dear Mr.Pokwal Riggins,    We are writing to inform you of your test results.      Resulted Orders   Lipid panel reflex to direct LDL Non-fasting   Result Value Ref Range    Cholesterol 184 <200 mg/dL    Triglycerides 130 <150 mg/dL    Direct Measure HDL 44 >=40 mg/dL    LDL Cholesterol Calculated 114 (H) <=100 mg/dL    Non HDL Cholesterol 140 (H) <130 mg/dL    Narrative    Cholesterol  Desirable:  <200 mg/dL    Triglycerides  Normal:  Less than 150 mg/dL  Borderline High:  150-199 mg/dL  High:  200-499 mg/dL  Very High:  Greater than or equal to 500 mg/dL    Direct Measure HDL  Female:  Greater than or equal to 50 mg/dL   Male:  Greater than or equal to 40 mg/dL    LDL Cholesterol  Desirable:  <100mg/dL  Above Desirable:  100-129 mg/dL   Borderline High:  130-159 mg/dL   High:  160-189 mg/dL   Very High:  >= 190 mg/dL    Non HDL Cholesterol  Desirable:  130 mg/dL  Above Desirable:  130-159 mg/dL  Borderline High:  160-189 mg/dL  High:  190-219 mg/dL  Very High:  Greater than or equal to 220 mg/dL   Albumin Random Urine Quantitative with Creat Ratio   Result Value Ref Range    Creatinine Urine mg/dL 92.6 mg/dL      Comment:      The reference ranges have not been established in urine creatinine. The results should be integrated into the clinical context for interpretation.    Albumin Urine mg/L <12.0 mg/L      Comment:      The reference ranges have not been established in urine albumin. The results should be integrated into the clinical context for interpretation.    Albumin Urine mg/g Cr        Comment:      Unable to calculate, urine albumin and/or urine creatinine is outside detectable limits.  Microalbuminuria is defined as an albumin:creatinine ratio of 17 to 299 for males and 25 to 299 for females. A ratio of albumin:creatinine of 300 or higher is indicative of overt proteinuria.  Due to biologic variability, positive  results should be confirmed by a second, first-morning random or 24-hour timed urine specimen. If there is discrepancy, a third specimen is recommended. When 2 out of 3 results are in the microalbuminuria range, this is evidence for incipient nephropathy and warrants increased efforts at glucose control, blood pressure control, and institution of therapy with an angiotensin-converting-enzyme (ACE) inhibitor (if the patient can tolerate it).         If you have any questions or concerns, please call the clinic at the number listed above.       Sincerely,      Kristian Muhammad MD

## 2023-07-12 ENCOUNTER — MEDICAL CORRESPONDENCE (OUTPATIENT)
Dept: HEALTH INFORMATION MANAGEMENT | Facility: CLINIC | Age: 83
End: 2023-07-12
Payer: COMMERCIAL

## 2023-07-12 ENCOUNTER — DOCUMENTATION ONLY (OUTPATIENT)
Dept: FAMILY MEDICINE | Facility: CLINIC | Age: 83
End: 2023-07-12
Payer: COMMERCIAL

## 2023-07-12 DIAGNOSIS — Z53.9 DIAGNOSIS NOT YET DEFINED: Primary | ICD-10-CM

## 2023-07-12 NOTE — PROGRESS NOTES
To be completed in Nursing note:    Please reference list for forms that require a visit for completion.  Please remind patients that providers are given 3-5 business days to complete and return forms.      Form type:Home health care: Home Health Certification and Plan of Care    Date form received: 7/12/23    Date form completed by Physician:7/12/23    How was form returned to patient (mailed, faxed, or at  for patient to ):Faxed back to [phone]    Date form mailed/faxed/left at  for patient and sent to HIM for scanning:Faxed 7/12/2023      Once form is left for patient, faxed, or mailed PCS will then close the documentation only encounter.      No

## 2023-07-13 ENCOUNTER — MEDICAL CORRESPONDENCE (OUTPATIENT)
Dept: HEALTH INFORMATION MANAGEMENT | Facility: CLINIC | Age: 83
End: 2023-07-13
Payer: COMMERCIAL

## 2023-08-14 ENCOUNTER — MEDICAL CORRESPONDENCE (OUTPATIENT)
Dept: HEALTH INFORMATION MANAGEMENT | Facility: CLINIC | Age: 83
End: 2023-08-14
Payer: COMMERCIAL

## 2023-08-14 ENCOUNTER — DOCUMENTATION ONLY (OUTPATIENT)
Dept: FAMILY MEDICINE | Facility: CLINIC | Age: 83
End: 2023-08-14
Payer: COMMERCIAL

## 2023-08-14 NOTE — PROGRESS NOTES
To be completed in Nursing note:    Please reference list for forms that require a visit for completion.  Please remind patients that providers are given 3-5 business days to complete and return forms.      Form type: [employer]: Prescription / Certificate of Medical Necessity     Date form received: 8/11/23    Date form completed by Physician: 8/14/23    How was form returned to patient (mailed, faxed, or at  for patient to ): FAXED back to [phone]    Date form mailed/faxed/left at  for patient and sent to HIM for scanning: FAXED 8/14/23      Once form is left for patient, faxed, or mailed PCS will then close the documentation only encounter.       Ivonne Kim MA

## 2023-08-17 NOTE — PROGRESS NOTES
To be completed in Nursing note:    Please reference list for forms that require a visit for completion.  Please remind patients that providers are given 3-5 business days to complete and return forms.      Form type:[facility] physicians orders for medications    Date form received: 10/14/19    Date form completed by Physician:10/16/19    How was form returned to patient (mailed, faxed, or at  for patient to ):faxed [phone]    Date form mailed/faxed/left at  for patient and sent to HIM for scanning:faxed 10/21/19      Once form is left for patient, faxed, or mailed PCS will then close the documentation only encounter.    Patient

## 2023-08-18 ENCOUNTER — OFFICE VISIT (OUTPATIENT)
Dept: FAMILY MEDICINE | Facility: CLINIC | Age: 83
End: 2023-08-18
Payer: COMMERCIAL

## 2023-08-18 VITALS
OXYGEN SATURATION: 98 % | RESPIRATION RATE: 16 BRPM | DIASTOLIC BLOOD PRESSURE: 58 MMHG | TEMPERATURE: 98.2 F | HEART RATE: 83 BPM | SYSTOLIC BLOOD PRESSURE: 115 MMHG

## 2023-08-18 DIAGNOSIS — H90.6 MIXED HEARING LOSS, BILATERAL: ICD-10-CM

## 2023-08-18 DIAGNOSIS — E11.69 TYPE 2 DIABETES MELLITUS WITH OTHER SPECIFIED COMPLICATION, WITHOUT LONG-TERM CURRENT USE OF INSULIN (H): Primary | ICD-10-CM

## 2023-08-18 DIAGNOSIS — Z29.9 ENCOUNTER FOR PREVENTIVE MEASURE: ICD-10-CM

## 2023-08-18 PROCEDURE — 90691 TYPHOID VACCINE IM: CPT | Performed by: FAMILY MEDICINE

## 2023-08-18 PROCEDURE — 90471 IMMUNIZATION ADMIN: CPT | Performed by: FAMILY MEDICINE

## 2023-08-18 PROCEDURE — 90472 IMMUNIZATION ADMIN EACH ADD: CPT | Performed by: FAMILY MEDICINE

## 2023-08-18 PROCEDURE — 90707 MMR VACCINE SC: CPT | Performed by: FAMILY MEDICINE

## 2023-08-18 PROCEDURE — 99214 OFFICE O/P EST MOD 30 MIN: CPT | Mod: 25 | Performed by: FAMILY MEDICINE

## 2023-08-18 RX ORDER — AZITHROMYCIN 250 MG/1
250 TABLET, FILM COATED ORAL DAILY
Qty: 6 TABLET | Refills: 0 | Status: SHIPPED | OUTPATIENT
Start: 2023-08-18 | End: 2023-08-24

## 2023-08-18 RX ORDER — ONDANSETRON 4 MG/1
4 TABLET, FILM COATED ORAL EVERY 8 HOURS PRN
Qty: 12 TABLET | Refills: 0 | Status: SHIPPED | OUTPATIENT
Start: 2023-08-18 | End: 2023-12-07

## 2023-08-18 RX ORDER — ATOVAQUONE AND PROGUANIL HYDROCHLORIDE 250; 100 MG/1; MG/1
1 TABLET, FILM COATED ORAL DAILY
Qty: 60 TABLET | Refills: 0 | Status: SHIPPED | OUTPATIENT
Start: 2023-08-18 | End: 2023-12-07

## 2023-08-18 NOTE — PROGRESS NOTES
[facility] Travel Visit       Rafy Riggins is a 83 year old male who presents for a pre-travel assessment visit.  He will be traveling to:    Destination(s):  Destination 1  [place], [place]  Date of departure 10/9/23  Date of return 11/21/23    Reason for travel: See family    Rafy Riggins has completed the travel questionnaire and it is reviewed: YES  Are there special circumstances which place this traveler at higher risk (List if 'yes')?: YES    (For women only)  Is patient currently pregnant or might become pregnant within three months of this trip? NO   Is she breastfeeding? NO     Patient Active Problem List   Diagnosis    HTN (hypertension)    Back pain    Poor vision    S/P lumbar spinal fusion    Colon polyp    Type 2 diabetes mellitus with other specified complication, without long-term current use of insulin (H)    Obesity    Lumbosacral radiculopathy at S1    Gout of foot    Hypothyroidism        Allergies   Allergen Reactions    Nka [No Known Allergies]        Social history:     Travelling with: Spouse, son    Immunizations, travel specific:  -- Yellow fever: Not Required, Not Recommended  -- Hep A: UTD with immunization   -- Hep B: Not immunized but known to be Immune (lab, definite prior disease)  -- Typhoid (IM: booster every 2 years; PO: booster every 5 years): Known to be not immune, not immunized  -- Rabies  (Data on the need for and timing of additional booster doses are not available): Known to be not immune, not immunized      Immunizations, routine adult:  -- Influenza UTD with immunization   -- Polio: UTD with immunization ; trip duration over 4 weeks so IPV booster is not needed  -- Diphtheria, Tetanus & Pertussis (DTaP): UTD with immunization   -- Measles/ Mumps/ Rubella: Immunity status unknown  -- Varicella: Not immunized but known to be Immune (lab, definite prior disease)  -- Pneumococcal (PPSV23 (Pneumovax)): UTD with immunization   -- Pneumococcal (PCV13  (Prevnar)): UTD with immunization   -- COVID-19: UTD with immunization     Malaria prophylaxis:  Recommended (refer to [facility] for destination-specific recommendation) YES       Review of Systems:       CONSTITUTIONAL: NEGATIVE for fever, chills, change in weight  ENT/MOUTH: NEGATIVE for ear, mouth and throat problems  RESP: NEGATIVE for significant cough or SOB  CV: NEGATIVE for chest pain, palpitations or peripheral edema          Objective:     There were no vitals taken for this visit.  There is no height or weight on file to calculate BMI.    GENERAL: healthy, alert, well nourished, well hydrated, no distress  HENT: ear canals- normal; TMs- normal; Nose- normal; Mouth- no ulcers, no lesions  NECK: no tenderness, no adenopathy, no asymmetry, no masses, no stiffness; thyroid- normal to palpation  RESP: lungs clear to auscultation - no rales, no rhonchi, no wheezes  CV: regular rates and rhythm, normal S1 S2, no S3 or S4 and no murmur, no click or rub -  ABDOMEN: soft, no tenderness, no  hepatosplenomegaly, no masses, normal bowel sounds         Assessment and Plan     Based on patient's past history, review of immunization and immunity status, planned itinerary and current recommendations, the following are recommended:    Typhoid vaccine   Malaria: Malarone: Begin 1-2 days before travel. Take daily while in the malarious area and for 7 days after returning.  Traveler's diarrhea treatment prescribed.  I spent 30 min in counselling and coordination of care on food and water precautions DEET and mosquito netting sunscreen    Patient is given a copy of the [place] traveller report as well as destination-specific recommendations on sun protection, avoiding insect bites and travel safety.      Total of 30 minutes was spent in face to face contact with patient with > 50% in counseling and coordination of care.  Total encounter including preparation, review of travel guidelines, placing orders and completion of  documentation: 32 minutes.  Options for treatment and/or follow-up care were reviewed with the patient. Rafy Riggins was engaged and actively involved in the decision making process. He verbalized understanding of the options discussed and was satisfied with the final plan.      Kristian Muhammad MD

## 2023-08-18 NOTE — NURSING NOTE
Prior to immunization administration, verified patients identity using patient s name and date of birth. Please see Immunization Activity for additional information.     Screening Questionnaire for Adult Immunization    Are you sick today?   No   Do you have allergies to medications, food, a vaccine component or latex?   No   Have you ever had a serious reaction after receiving a vaccination?   No   Do you have a long-term health problem with heart, lung, kidney, or metabolic disease (e.g., diabetes), asthma, a blood disorder, no spleen, complement component deficiency, a cochlear implant, or a spinal fluid leak?  Are you on long-term aspirin therapy?   No   Do you have cancer, leukemia, HIV/AIDS, or any other immune system problem?   No   Do you have a parent, brother, or sister with an immune system problem?   No   In the past 3 months, have you taken medications that affect  your immune system, such as prednisone, other steroids, or anticancer drugs; drugs for the treatment of rheumatoid arthritis, Crohn s disease, or psoriasis; or have you had radiation treatments?   No   Have you had a seizure, or a brain or other nervous system problem?   No   During the past year, have you received a transfusion of blood or blood    products, or been given immune (gamma) globulin or antiviral drug?   No   For women: Are you pregnant or is there a chance you could become       pregnant during the next month?   No   Have you received any vaccinations in the past 4 weeks?   No     Immunization questionnaire answers were all negative.      Patient instructed to remain in clinic for 15 minutes afterwards, and to report any adverse reactions.     Screening performed by Ivonne Kim MA on 8/18/2023 at 11:41 AM.

## 2023-08-23 ENCOUNTER — DOCUMENTATION ONLY (OUTPATIENT)
Dept: FAMILY MEDICINE | Facility: CLINIC | Age: 83
End: 2023-08-23
Payer: COMMERCIAL

## 2023-08-23 NOTE — PROGRESS NOTES
To be completed in Nursing note:    Please reference list for forms that require a visit for completion.  Please remind patients that providers are given 3-5 business days to complete and return forms.      Form type: Prescription/ Certificate of Medical Necessity     Date form received: 8/16/23    Date form completed by Physician: 8/23/23    How was form returned to patient (mailed, faxed, or at  for patient to ): Faxed back to [phone]    Date form mailed/faxed/left at  for patient and sent to HIM for scanning: Faxed 8/23/23      Once form is left for patient, faxed, or mailed PCS will then close the documentation only encounter.       Ivonne Kim MA

## 2023-08-29 NOTE — PATIENT INSTRUCTIONS
08/29/23   [facility]  [address] [place] [address], [address]  [place] [address]  Phone: [phone]  Fax: [phone]     Referral, demographics, and office visit faxed to [phone]    Alee Nichols

## 2023-09-07 ENCOUNTER — DOCUMENTATION ONLY (OUTPATIENT)
Dept: FAMILY MEDICINE | Facility: CLINIC | Age: 83
End: 2023-09-07
Payer: COMMERCIAL

## 2023-09-07 DIAGNOSIS — Z53.9 DIAGNOSIS NOT YET DEFINED: Primary | ICD-10-CM

## 2023-10-02 ENCOUNTER — OFFICE VISIT (OUTPATIENT)
Dept: FAMILY MEDICINE | Facility: CLINIC | Age: 83
End: 2023-10-02
Payer: COMMERCIAL

## 2023-10-02 VITALS
TEMPERATURE: 98.4 F | SYSTOLIC BLOOD PRESSURE: 134 MMHG | RESPIRATION RATE: 18 BRPM | HEART RATE: 79 BPM | DIASTOLIC BLOOD PRESSURE: 70 MMHG | OXYGEN SATURATION: 98 %

## 2023-10-02 DIAGNOSIS — M15.9 OSTEOARTHRITIS OF MULTIPLE JOINTS, UNSPECIFIED OSTEOARTHRITIS TYPE: ICD-10-CM

## 2023-10-02 DIAGNOSIS — Z86.73 HISTORY OF CVA (CEREBROVASCULAR ACCIDENT): ICD-10-CM

## 2023-10-02 DIAGNOSIS — E11.69 TYPE 2 DIABETES MELLITUS WITH OTHER SPECIFIED COMPLICATION, WITHOUT LONG-TERM CURRENT USE OF INSULIN (H): Primary | ICD-10-CM

## 2023-10-02 DIAGNOSIS — Z23 ENCOUNTER FOR IMMUNIZATION: ICD-10-CM

## 2023-10-02 DIAGNOSIS — I10 ESSENTIAL HYPERTENSION WITH GOAL BLOOD PRESSURE LESS THAN 140/90: ICD-10-CM

## 2023-10-02 PROCEDURE — 99213 OFFICE O/P EST LOW 20 MIN: CPT | Mod: 25 | Performed by: FAMILY MEDICINE

## 2023-10-02 PROCEDURE — 90471 IMMUNIZATION ADMIN: CPT | Performed by: FAMILY MEDICINE

## 2023-10-02 PROCEDURE — 90662 IIV NO PRSV INCREASED AG IM: CPT | Performed by: FAMILY MEDICINE

## 2023-10-02 NOTE — PATIENT INSTRUCTIONS
Medications for Trip:  To prevent Malaria, take daily throughout travel and for additional week on return home:      Start mediation if diarrhea develops:

## 2023-10-02 NOTE — PROGRESS NOTES
Assessment & Plan     Type 2 diabetes mellitus with other specified complication, without long-term current use of insulin (H)      Essential hypertension with goal blood pressure less than 140/90      Osteoarthritis of multiple joints, unspecified osteoarthritis type      History of CVA (cerebrovascular accident)      Encounter for immunization        24 minutes spent by me on the date of the encounter doing chart review, history and exam, documentation and further activities per the note           No follow-ups on file.    Kristian Muhammad MD  [facility]    Dain Hillman is a 83 year old, presenting for the following health issues:  RECHECK (F/U & FLU SHOT)      10/2/2023     9:51 AM   Additional Questions   Roomed by jaycee   Accompanied by self, wife, and son       HPI   Upcoming travel. Long discussion on travel medications(when to start, etc), including chronic medicaiton insulin (administration, air travel and storage)  Requests flu shot      Review of Systems   Constitutional, HEENT, cardiovascular, pulmonary, gi and gu systems are negative, except as otherwise noted.      Objective    BP (!) 144/77   Pulse 79   Temp 98.4  F (36.9  C) (Oral)   Resp 18   SpO2 98%   There is no height or weight on file to calculate BMI.  Physical Exam   GENERAL: healthy, alert and no distress  NECK: no adenopathy, no asymmetry, masses, or scars and thyroid normal to palpation  RESP: lungs clear to auscultation - no rales, rhonchi or wheezes  CV: regular rate and rhythm, normal S1 S2, no S3 or S4, no murmur, click or rub, no peripheral edema and peripheral pulses strong  ABDOMEN: soft, nontender, no hepatosplenomegaly, no masses and bowel sounds normal  MS: no gross musculoskeletal defects noted, no edema

## 2023-10-26 ENCOUNTER — TELEPHONE (OUTPATIENT)
Dept: PHARMACY | Facility: PHYSICIAN GROUP | Age: 83
End: 2023-10-26
Payer: COMMERCIAL

## 2023-10-26 NOTE — TELEPHONE ENCOUNTER
Called patient to schedule an MTM visit based on an insurance referral from [employer]. Left a voicemail with the MTM scheduling line ([phone]) for the patient to call back.     Norman Xavier, PharmD  Medication Therapy Management Pharmacist  [facility]  Office phone: [phone]

## 2023-10-31 ENCOUNTER — MEDICAL CORRESPONDENCE (OUTPATIENT)
Dept: HEALTH INFORMATION MANAGEMENT | Facility: CLINIC | Age: 83
End: 2023-10-31
Payer: COMMERCIAL

## 2023-11-16 ENCOUNTER — TELEPHONE (OUTPATIENT)
Dept: PHARMACY | Facility: CLINIC | Age: 83
End: 2023-11-16
Payer: COMMERCIAL

## 2023-11-16 NOTE — CONFIDENTIAL NOTE
Called patient to conduct a comprehensive medication review as requested by patients [employer] insurance. Left a voicemail for a call back via [facility] .    Hal Dalal, Pharm. D., [facility]  Medication Therapy Management Pharmacist  Direct Voicemail: [phone]

## 2023-12-07 ENCOUNTER — OFFICE VISIT (OUTPATIENT)
Dept: FAMILY MEDICINE | Facility: CLINIC | Age: 83
End: 2023-12-07
Payer: COMMERCIAL

## 2023-12-07 DIAGNOSIS — R68.89 FORGETFULNESS: ICD-10-CM

## 2023-12-07 DIAGNOSIS — M15.9 OSTEOARTHRITIS OF MULTIPLE JOINTS, UNSPECIFIED OSTEOARTHRITIS TYPE: ICD-10-CM

## 2023-12-07 DIAGNOSIS — E11.69 TYPE 2 DIABETES MELLITUS WITH OTHER SPECIFIED COMPLICATION, WITHOUT LONG-TERM CURRENT USE OF INSULIN (H): Primary | ICD-10-CM

## 2023-12-07 DIAGNOSIS — M1A.9XX0 CHRONIC GOUT WITHOUT TOPHUS, UNSPECIFIED CAUSE, UNSPECIFIED SITE: ICD-10-CM

## 2023-12-07 DIAGNOSIS — Z00.00 ROUTINE GENERAL MEDICAL EXAMINATION AT A HEALTH CARE FACILITY: ICD-10-CM

## 2023-12-07 DIAGNOSIS — E78.5 HYPERLIPIDEMIA LDL GOAL <100: ICD-10-CM

## 2023-12-07 DIAGNOSIS — R12 HEARTBURN: ICD-10-CM

## 2023-12-07 DIAGNOSIS — U07.1 2019 NOVEL CORONAVIRUS DISEASE (COVID-19): ICD-10-CM

## 2023-12-07 DIAGNOSIS — E11.9 TYPE 2 DIABETES MELLITUS WITHOUT COMPLICATION, WITHOUT LONG-TERM CURRENT USE OF INSULIN (H): ICD-10-CM

## 2023-12-07 DIAGNOSIS — E03.9 HYPOTHYROIDISM, UNSPECIFIED TYPE: ICD-10-CM

## 2023-12-07 LAB — HBA1C MFR BLD: 6.2 % (ref 0–5.6)

## 2023-12-07 PROCEDURE — 36415 COLL VENOUS BLD VENIPUNCTURE: CPT | Performed by: FAMILY MEDICINE

## 2023-12-07 PROCEDURE — 99215 OFFICE O/P EST HI 40 MIN: CPT | Mod: 25 | Performed by: FAMILY MEDICINE

## 2023-12-07 PROCEDURE — 83036 HEMOGLOBIN GLYCOSYLATED A1C: CPT | Performed by: FAMILY MEDICINE

## 2023-12-07 PROCEDURE — 90480 ADMN SARSCOV2 VAC 1/ONLY CMP: CPT | Performed by: FAMILY MEDICINE

## 2023-12-07 PROCEDURE — 91320 SARSCV2 VAC 30MCG TRS-SUC IM: CPT | Performed by: FAMILY MEDICINE

## 2023-12-07 RX ORDER — ALLOPURINOL 100 MG/1
100 TABLET ORAL DAILY
Qty: 90 TABLET | Refills: 3 | Status: SHIPPED | OUTPATIENT
Start: 2023-12-07 | End: 2024-05-22

## 2023-12-07 RX ORDER — ATORVASTATIN CALCIUM 40 MG/1
40 TABLET, FILM COATED ORAL DAILY
Qty: 90 TABLET | Refills: 3 | Status: SHIPPED | OUTPATIENT
Start: 2023-12-07 | End: 2024-05-22

## 2023-12-07 RX ORDER — MULTIPLE VITAMINS W/ MINERALS TAB 9MG-400MCG
1 TAB ORAL DAILY
Qty: 100 TABLET | Refills: 3 | Status: SHIPPED | OUTPATIENT
Start: 2023-12-07 | End: 2024-05-22

## 2023-12-07 RX ORDER — FAMOTIDINE 40 MG/1
40 TABLET, FILM COATED ORAL DAILY
Qty: 90 TABLET | Refills: 3 | Status: SHIPPED | OUTPATIENT
Start: 2023-12-07 | End: 2024-05-22

## 2023-12-07 RX ORDER — LOSARTAN POTASSIUM 50 MG/1
50 TABLET ORAL DAILY
Qty: 90 TABLET | Refills: 3 | Status: SHIPPED | OUTPATIENT
Start: 2023-12-07 | End: 2024-05-22

## 2023-12-07 RX ORDER — DULOXETIN HYDROCHLORIDE 30 MG/1
CAPSULE, DELAYED RELEASE ORAL
Qty: 90 CAPSULE | Refills: 11 | Status: SHIPPED | OUTPATIENT
Start: 2023-12-07 | End: 2024-05-22

## 2023-12-07 RX ORDER — ASPIRIN 81 MG/1
81 TABLET ORAL DAILY
Qty: 90 TABLET | Refills: 3 | Status: SHIPPED | OUTPATIENT
Start: 2023-12-07 | End: 2024-05-22

## 2023-12-07 RX ORDER — ACETAMINOPHEN 325 MG/1
TABLET ORAL
Qty: 100 TABLET | Refills: 3 | Status: SHIPPED | OUTPATIENT
Start: 2023-12-07 | End: 2024-02-26

## 2023-12-07 RX ORDER — LEVOTHYROXINE SODIUM 75 UG/1
75 TABLET ORAL DAILY
Qty: 90 TABLET | Refills: 3 | Status: SHIPPED | OUTPATIENT
Start: 2023-12-07 | End: 2024-05-22

## 2023-12-07 RX ORDER — GABAPENTIN 300 MG/1
CAPSULE ORAL
Qty: 60 CAPSULE | Refills: 11 | Status: SHIPPED | OUTPATIENT
Start: 2023-12-07 | End: 2024-05-22

## 2023-12-07 NOTE — PROGRESS NOTES
Assessment & Plan     Type 2 diabetes mellitus with other specified complication, without long-term current use of insulin (H)  Controlled. Continue Rx  - HEMOGLOBIN A1C; Future  - Home Care Referral    2019 novel coronavirus disease (COVID-19)    - acetaminophen (TYLENOL) 325 MG tablet; TAKE 1 TO 2 TABLETS BY MOUTH EVERY 6 HOURS AS NEEDED FOR MILD PAIN    Osteoarthritis of multiple joints, unspecified osteoarthritis type    - acetaminophen (TYLENOL) 325 MG tablet; TAKE 1 TO 2 TABLETS BY MOUTH EVERY 6 HOURS AS NEEDED FOR MILD PAIN  - gabapentin (NEURONTIN) 300 MG capsule; TAKE 1 CAPSULE(300 MG) BY MOUTH TWICE DAILY AS NEEDED FOR PAIN  - DULoxetine (CYMBALTA) 30 MG capsule; TAKE 3 CAPSULES(90 MG) BY MOUTH DAILY  - Home Care Referral    Chronic gout without tophus, unspecified cause, unspecified site  Stable. No flares on Rx  - allopurinol (ZYLOPRIM) 100 MG tablet; Take 1 tablet (100 mg) by mouth daily    Forgetfulness    - multivitamin w/minerals (MULTIVITAMIN, THERAPEUTIC WITH MINERALS) tablet; Take 1 tablet by mouth daily    Heartburn    - famotidine (PEPCID) 40 MG tablet; Take 1 tablet (40 mg) by mouth daily    Hyperlipidemia LDL goal <100    - atorvastatin (LIPITOR) 40 MG tablet; Take 1 tablet (40 mg) by mouth daily    Hypothyroidism, unspecified type    - levothyroxine (SYNTHROID/LEVOTHROID) 75 MCG tablet; Take 1 tablet (75 mcg) by mouth daily    Routine general medical examination at a health care facility    - aspirin 81 MG EC tablet; Take 1 tablet (81 mg) by mouth daily    Type 2 diabetes mellitus without complication, without long-term current use of insulin (H)    - losartan (COZAAR) 50 MG tablet; Take 1 tablet (50 mg) by mouth daily  - metFORMIN (GLUCOPHAGE) 500 MG tablet; TAKE 2 TABLETS BY MOUTH EVERY MORNING AND 1 TABLET IN THE EVENING    Diagnosis or treatment significantly limited by social determinants of health - LEP  Prescription drug management  41 minutes spent by me on the date of the encounter  "doing chart review, history and exam, documentation and further activities per the note    RTC 3 months    Kristian Muhammad MD  [facility]    Dain Hillman is a 83 year old, presenting for the following health issues:  Medication Request (Med refill) and OTHER (Poss referral to home care)      HPI   Returned from travel overseas.  Feeling well. Ran out of BP med while traveling; did not take today. Son reports BP \"good\" while on Rx  Requesting [facility] for medication set up.  C currently seeing  Rafy's wife in their household    Review of Systems   Constitutional, HEENT, cardiovascular, pulmonary, gi and gu systems are negative, except as otherwise noted.      Objective    BP (!) 159/79   Pulse 72   Temp 97.9  F (36.6  C) (Oral)   Resp 18   SpO2 98%   There is no height or weight on file to calculate BMI.  Physical Exam   GENERAL: healthy, alert and no distress.  Seated in wheel chair  NECK: no adenopathy, no asymmetry, masses, or scars and thyroid normal to palpation  RESP: lungs clear to auscultation - no rales, rhonchi or wheezes  CV: regular rate and rhythm, normal S1 S2, no S3 or S4, no murmur. Perfusion adequete                        "

## 2023-12-07 NOTE — PROGRESS NOTES
Assessment & Plan     Type 2 diabetes mellitus with other specified complication, without long-term current use of insulin (H)    - HEMOGLOBIN A1C; Future  - Home Care Referral  - HEMOGLOBIN A1C    2019 novel coronavirus disease (COVID-19)    - acetaminophen (TYLENOL) 325 MG tablet; TAKE 1 TO 2 TABLETS BY MOUTH EVERY 6 HOURS AS NEEDED FOR MILD PAIN    Osteoarthritis of multiple joints, unspecified osteoarthritis type    - acetaminophen (TYLENOL) 325 MG tablet; TAKE 1 TO 2 TABLETS BY MOUTH EVERY 6 HOURS AS NEEDED FOR MILD PAIN  - gabapentin (NEURONTIN) 300 MG capsule; TAKE 1 CAPSULE(300 MG) BY MOUTH TWICE DAILY AS NEEDED FOR PAIN  - DULoxetine (CYMBALTA) 30 MG capsule; TAKE 3 CAPSULES(90 MG) BY MOUTH DAILY  - Home Care Referral    Chronic gout without tophus, unspecified cause, unspecified site    - allopurinol (ZYLOPRIM) 100 MG tablet; Take 1 tablet (100 mg) by mouth daily    Forgetfulness    - multivitamin w/minerals (MULTIVITAMIN, THERAPEUTIC WITH MINERALS) tablet; Take 1 tablet by mouth daily    Heartburn    - famotidine (PEPCID) 40 MG tablet; Take 1 tablet (40 mg) by mouth daily    Hyperlipidemia LDL goal <100  - atorvastatin (LIPITOR) 40 MG tablet; Take 1 tablet (40 mg) by mouth daily    Hypothyroidism, unspecified type    - levothyroxine (SYNTHROID/LEVOTHROID) 75 MCG tablet; Take 1 tablet (75 mcg) by mouth daily    Routine general medical examination at a health care facility    - aspirin 81 MG EC tablet; Take 1 tablet (81 mg) by mouth daily    Type 2 diabetes mellitus without complication, without long-term current use of insulin (H)    - losartan (COZAAR) 50 MG tablet; Take 1 tablet (50 mg) by mouth daily  - metFORMIN (GLUCOPHAGE) 500 MG tablet; TAKE 2 TABLETS BY MOUTH EVERY MORNING AND 1 TABLET IN THE EVENING      41 minutes spent by me on the date of the encounter doing chart review, history and exam, documentation and further activities per the note      Kristian Muhammad MD  [facility]  NORA Hillman is a 83 year old, presenting for the following health issues:  Medication Request (Med refill) and OTHER (Poss referral to home care)      HPI Presents for review on medication, diabetes follow up and return from Travel overseas.  Due to travel, ran out of BP Rx and has not had for 3 days. BP elevated today. Son reports, when checked at home, /80      Review of Systems   Constitutional, HEENT, cardiovascular, pulmonary, gi and gu systems are negative, except as otherwise noted.      Objective    BP (!) 159/79   Pulse 72   Temp 97.9  F (36.6  C) (Oral)   Resp 18   SpO2 98%   There is no height or weight on file to calculate BMI.  Physical Exam   GENERAL: healthy, alert and no distress  NECK: no adenopathy, no asymmetry, masses, or scars and thyroid normal to palpation  RESP: lungs clear to auscultation - no rales, rhonchi or wheezes  CV: regular rate and rhythm, normal S1 S2, no S3 or S4, no murmur, click or rub, no peripheral edema and peripheral pulses strong  ABDOMEN: soft, nontender, no hepatosplenomegaly, no masses and bowel sounds normal  MS: no gross musculoskeletal defects noted, no edema                       Cardiology

## 2023-12-08 VITALS
TEMPERATURE: 97.9 F | OXYGEN SATURATION: 98 % | HEART RATE: 72 BPM | DIASTOLIC BLOOD PRESSURE: 80 MMHG | RESPIRATION RATE: 18 BRPM | SYSTOLIC BLOOD PRESSURE: 136 MMHG

## 2024-02-22 ENCOUNTER — DOCUMENTATION ONLY (OUTPATIENT)
Dept: FAMILY MEDICINE | Facility: CLINIC | Age: 84
End: 2024-02-22
Payer: COMMERCIAL

## 2024-02-22 ENCOUNTER — MEDICAL CORRESPONDENCE (OUTPATIENT)
Dept: HEALTH INFORMATION MANAGEMENT | Facility: CLINIC | Age: 84
End: 2024-02-22
Payer: COMMERCIAL

## 2024-02-22 NOTE — PROGRESS NOTES
To be completed in Nursing note:    Please reference list for forms that require a visit for completion.  Please remind patients that providers are given 3-5 business days to complete and return forms.      Form type: [employer] ~ CONFIRMATION OF ORDER     Date form received: 2/15/2024    Date form completed by Physician: 2/22/2024    How was form returned to patient (mailed, faxed, or at  for patient to ):    Fax to [phone]    Date form mailed/faxed/left at  for patient and sent to HIM for scanning:    Fax on 2/22/2024    Once form is left for patient, faxed, or mailed PCS will then close the documentation only encounter.

## 2024-02-23 ENCOUNTER — MEDICAL CORRESPONDENCE (OUTPATIENT)
Dept: HEALTH INFORMATION MANAGEMENT | Facility: CLINIC | Age: 84
End: 2024-02-23
Payer: COMMERCIAL

## 2024-02-26 DIAGNOSIS — M15.9 OSTEOARTHRITIS OF MULTIPLE JOINTS, UNSPECIFIED OSTEOARTHRITIS TYPE: ICD-10-CM

## 2024-02-26 DIAGNOSIS — U07.1 2019 NOVEL CORONAVIRUS DISEASE (COVID-19): ICD-10-CM

## 2024-02-26 RX ORDER — ACETAMINOPHEN 325 MG/1
TABLET ORAL
Qty: 100 TABLET | Refills: 3 | Status: SHIPPED | OUTPATIENT
Start: 2024-02-26

## 2024-02-26 NOTE — TELEPHONE ENCOUNTER
Medication requested: ACETAMINOPHEN 325MG TABLETS   Last office visit: 12/7/23  [facility] appointments: none  Medication last refilled: 2/17/24  Last qualifying labs: none    Routing refill request to provider for review/approval because:    Analgesics (Non-Narcotic Tylenol and ASA Only) [id number]/26/2024 04:01 AM   Protocol Details Medication matches indication        JANEY Venegas, BSN

## 2024-02-29 DIAGNOSIS — I10 ESSENTIAL HYPERTENSION WITH GOAL BLOOD PRESSURE LESS THAN 140/90: Primary | ICD-10-CM

## 2024-02-29 RX ORDER — GLYCERIN ADULT
SUPPOSITORY, RECTAL RECTAL SEE ADMIN INSTRUCTIONS
Qty: 1 EACH | Refills: 0 | Status: SHIPPED | OUTPATIENT
Start: 2024-02-29 | End: 2024-07-25

## 2024-04-30 ENCOUNTER — TELEPHONE (OUTPATIENT)
Dept: FAMILY MEDICINE | Facility: CLINIC | Age: 84
End: 2024-04-30
Payer: COMMERCIAL

## 2024-04-30 NOTE — TELEPHONE ENCOUNTER
Attempted to contact pt son to provide support with setting up home care services. Home care order was faxed to [facility], however, they are unable to accept pt due to capacity. [facility], Left detailed vm with return number for pt son to call back if they have further questions.    Hever Glez, SUSY

## 2024-05-13 DIAGNOSIS — E11.69 TYPE 2 DIABETES MELLITUS WITH OTHER SPECIFIED COMPLICATION, WITHOUT LONG-TERM CURRENT USE OF INSULIN (H): Primary | ICD-10-CM

## 2024-05-22 ENCOUNTER — OFFICE VISIT (OUTPATIENT)
Dept: FAMILY MEDICINE | Facility: CLINIC | Age: 84
End: 2024-05-22
Payer: COMMERCIAL

## 2024-05-22 VITALS
HEIGHT: 62 IN | RESPIRATION RATE: 16 BRPM | BODY MASS INDEX: 25.17 KG/M2 | HEART RATE: 92 BPM | DIASTOLIC BLOOD PRESSURE: 70 MMHG | SYSTOLIC BLOOD PRESSURE: 157 MMHG | TEMPERATURE: 98.4 F | OXYGEN SATURATION: 98 % | WEIGHT: 136.8 LBS

## 2024-05-22 DIAGNOSIS — R06.2 WHEEZING: ICD-10-CM

## 2024-05-22 DIAGNOSIS — E78.5 HYPERLIPIDEMIA LDL GOAL <100: ICD-10-CM

## 2024-05-22 DIAGNOSIS — M15.9 OSTEOARTHRITIS OF MULTIPLE JOINTS, UNSPECIFIED OSTEOARTHRITIS TYPE: ICD-10-CM

## 2024-05-22 DIAGNOSIS — I10 PRIMARY HYPERTENSION: ICD-10-CM

## 2024-05-22 DIAGNOSIS — M1A.9XX0 CHRONIC GOUT WITHOUT TOPHUS, UNSPECIFIED CAUSE, UNSPECIFIED SITE: ICD-10-CM

## 2024-05-22 DIAGNOSIS — R68.89 FORGETFULNESS: ICD-10-CM

## 2024-05-22 DIAGNOSIS — Z00.00 ROUTINE GENERAL MEDICAL EXAMINATION AT A HEALTH CARE FACILITY: ICD-10-CM

## 2024-05-22 DIAGNOSIS — E03.9 HYPOTHYROIDISM, UNSPECIFIED TYPE: ICD-10-CM

## 2024-05-22 DIAGNOSIS — K59.1 FUNCTIONAL DIARRHEA: ICD-10-CM

## 2024-05-22 DIAGNOSIS — E11.69 TYPE 2 DIABETES MELLITUS WITH OTHER SPECIFIED COMPLICATION, WITHOUT LONG-TERM CURRENT USE OF INSULIN (H): Primary | ICD-10-CM

## 2024-05-22 DIAGNOSIS — R12 HEARTBURN: ICD-10-CM

## 2024-05-22 DIAGNOSIS — E11.9 TYPE 2 DIABETES MELLITUS WITHOUT COMPLICATION, WITHOUT LONG-TERM CURRENT USE OF INSULIN (H): ICD-10-CM

## 2024-05-22 LAB — HBA1C MFR BLD: 6.2 % (ref 0–5.6)

## 2024-05-22 PROCEDURE — 84443 ASSAY THYROID STIM HORMONE: CPT | Performed by: FAMILY MEDICINE

## 2024-05-22 PROCEDURE — 83036 HEMOGLOBIN GLYCOSYLATED A1C: CPT | Performed by: FAMILY MEDICINE

## 2024-05-22 PROCEDURE — 80048 BASIC METABOLIC PNL TOTAL CA: CPT | Performed by: FAMILY MEDICINE

## 2024-05-22 PROCEDURE — 36415 COLL VENOUS BLD VENIPUNCTURE: CPT | Performed by: FAMILY MEDICINE

## 2024-05-22 PROCEDURE — 99214 OFFICE O/P EST MOD 30 MIN: CPT | Performed by: FAMILY MEDICINE

## 2024-05-22 RX ORDER — PREDNISONE 20 MG/1
40 TABLET ORAL DAILY
Qty: 10 TABLET | Refills: 0 | Status: SHIPPED | OUTPATIENT
Start: 2024-05-22 | End: 2024-05-27

## 2024-05-22 RX ORDER — LOSARTAN POTASSIUM 100 MG/1
100 TABLET ORAL DAILY
Qty: 90 TABLET | Refills: 3 | Status: SHIPPED | OUTPATIENT
Start: 2024-05-22 | End: 2024-09-25 | Stop reason: DRUGHIGH

## 2024-05-22 RX ORDER — FAMOTIDINE 40 MG/1
40 TABLET, FILM COATED ORAL DAILY
Qty: 90 TABLET | Refills: 3 | Status: SHIPPED | OUTPATIENT
Start: 2024-05-22

## 2024-05-22 RX ORDER — ASPIRIN 81 MG/1
81 TABLET ORAL DAILY
Qty: 90 TABLET | Refills: 3 | Status: SHIPPED | OUTPATIENT
Start: 2024-05-22

## 2024-05-22 RX ORDER — LOSARTAN POTASSIUM 50 MG/1
50 TABLET ORAL DAILY
Qty: 90 TABLET | Refills: 3 | Status: SHIPPED | OUTPATIENT
Start: 2024-05-22 | End: 2024-05-22

## 2024-05-22 RX ORDER — LEVOTHYROXINE SODIUM 75 UG/1
75 TABLET ORAL DAILY
Qty: 90 TABLET | Refills: 3 | Status: SHIPPED | OUTPATIENT
Start: 2024-05-22

## 2024-05-22 RX ORDER — LOPERAMIDE HYDROCHLORIDE 2 MG/1
2 TABLET ORAL DAILY PRN
Qty: 30 TABLET | Refills: 3 | Status: SHIPPED | OUTPATIENT
Start: 2024-05-22

## 2024-05-22 RX ORDER — ALLOPURINOL 100 MG/1
100 TABLET ORAL DAILY
Qty: 90 TABLET | Refills: 3 | Status: SHIPPED | OUTPATIENT
Start: 2024-05-22

## 2024-05-22 RX ORDER — ASPIRIN 81 MG
1 TABLET, DELAYED RELEASE (ENTERIC COATED) ORAL DAILY
Qty: 100 TABLET | Refills: 3 | Status: SHIPPED | OUTPATIENT
Start: 2024-05-22

## 2024-05-22 RX ORDER — GABAPENTIN 300 MG/1
CAPSULE ORAL
Qty: 60 CAPSULE | Refills: 11 | Status: SHIPPED | OUTPATIENT
Start: 2024-05-22 | End: 2024-09-13

## 2024-05-22 RX ORDER — ATORVASTATIN CALCIUM 40 MG/1
40 TABLET, FILM COATED ORAL DAILY
Qty: 90 TABLET | Refills: 3 | Status: SHIPPED | OUTPATIENT
Start: 2024-05-22

## 2024-05-22 RX ORDER — DULOXETIN HYDROCHLORIDE 30 MG/1
CAPSULE, DELAYED RELEASE ORAL
Qty: 90 CAPSULE | Refills: 11 | Status: SHIPPED | OUTPATIENT
Start: 2024-05-22

## 2024-05-22 NOTE — LETTER
May 23, 2024      Rafy Riggins  [address] [facility] [facility] [facility] [address]        Dear Mr.Pokwal Riggins,    We are writing to inform you of your test results.    These results are within the normal range for you.  Please follow up in the clinic as directed.     Resulted Orders   Hemoglobin A1c   Result Value Ref Range    Hemoglobin A1C 6.2 (H) 0.0 - 5.6 %      Comment:      Normal <5.7%   Prediabetes 5.7-6.4%    Diabetes 6.5% or higher     Note: Adopted from ADA consensus guidelines.       If you have any questions or concerns, please call the clinic at the number listed above.       Sincerely,      Jadiel Eller MD

## 2024-05-22 NOTE — LETTER
May 29, 2024      [place]af[place]iggins  [address] [facility] [address]        Dear Mr.Pokwal Riggins,    We are writing to inform you of your test results.    These results are within the normal range for you.  Please follow up in the clinic as directed.     Resulted Orders   TSH with free T4 reflex   Result Value Ref Range    TSH 0.99 0.30 - 4.20 uIU/mL   Basic metabolic panel   Result Value Ref Range    Sodium 141 135 - 145 mmol/L      Comment:      Reference intervals for this test were updated on 09/26/2023 to more accurately reflect our healthy population. There may be differences in the flagging of prior results with similar values performed with this method. Interpretation of those prior results can be made in the context of the updated reference intervals.     Potassium 4.4 3.4 - 5.3 mmol/L    Chloride 109 (H) 98 - 107 mmol/L    Carbon Dioxide (CO2) 22 22 - 29 mmol/L    Anion Gap 10 7 - 15 mmol/L    Urea Nitrogen 10.9 8.0 - 23.0 mg/dL    Creatinine 1.11 0.67 - 1.17 mg/dL    GFR Estimate 65 >60 mL/min/1.73m2    Calcium 9.3 8.8 - 10.2 mg/dL    Glucose 69 (L) 70 - 99 mg/dL   Hemoglobin A1c   Result Value Ref Range    Hemoglobin A1C 6.2 (H) 0.0 - 5.6 %      Comment:      Normal <5.7%   Prediabetes 5.7-6.4%    Diabetes 6.5% or higher     Note: Adopted from ADA consensus guidelines.       If you have any questions or concerns, please call the clinic at the number listed above.       Sincerely,      Jadiel Eller MD

## 2024-05-23 LAB
ANION GAP SERPL CALCULATED.3IONS-SCNC: 10 MMOL/L (ref 7–15)
BUN SERPL-MCNC: 10.9 MG/DL (ref 8–23)
CALCIUM SERPL-MCNC: 9.3 MG/DL (ref 8.8–10.2)
CHLORIDE SERPL-SCNC: 109 MMOL/L (ref 98–107)
CREAT SERPL-MCNC: 1.11 MG/DL (ref 0.67–1.17)
DEPRECATED HCO3 PLAS-SCNC: 22 MMOL/L (ref 22–29)
EGFRCR SERPLBLD CKD-EPI 2021: 65 ML/MIN/1.73M2
GLUCOSE SERPL-MCNC: 69 MG/DL (ref 70–99)
POTASSIUM SERPL-SCNC: 4.4 MMOL/L (ref 3.4–5.3)
SODIUM SERPL-SCNC: 141 MMOL/L (ref 135–145)
TSH SERPL DL<=0.005 MIU/L-ACNC: 0.99 UIU/ML (ref 0.3–4.2)

## 2024-05-23 NOTE — PROGRESS NOTES
Assessment & Plan   Home health care is a significant need for this patient and for his wife.  I did point out to the patient's son that there is a real shortage of home health nurse providers at this time and most of our patients are going without home health nursing care in their home at this time, unfortunately.  The [facility] paramedic program came to an end of last year and we do not really have a substitute.    I did point out that our [facility] staff are happy to set up pills and pillboxes on a monthly basis and I offered to schedule an appointment for that but the son was not interested in that.    Hypertension is not at goal.  Will increase the losartan from 50 to 100 mg a day.    Type 2 diabetes mellitus without complication, without long-term current use of insulin (H)  Continue with his current medications and check labs today.  Refilled his medications.  - metFORMIN (GLUCOPHAGE) 500 MG tablet; TAKE 2 TABLETS BY MOUTH EVERY MORNING AND 1 TABLET IN THE EVENING  - losartan (COZAAR) 100 MG tablet; Take 1 tablet (100 mg) by mouth daily    Osteoarthritis of multiple joints, unspecified osteoarthritis type  Patient  - gabapentin (NEURONTIN) 300 MG capsule; TAKE 1 CAPSULE(300 MG) BY MOUTH TWICE DAILY AS NEEDED FOR PAIN  - DULoxetine (CYMBALTA) 30 MG capsule; TAKE 3 CAPSULES(90 MG) BY MOUTH DAILY    Chronic gout without tophus, unspecified cause, unspecified site    - allopurinol (ZYLOPRIM) 100 MG tablet; Take 1 tablet (100 mg) by mouth daily    Heartburn    - famotidine (PEPCID) 40 MG tablet; Take 1 tablet (40 mg) by mouth daily    Hyperlipidemia LDL goal <100    - atorvastatin (LIPITOR) 40 MG tablet; Take 1 tablet (40 mg) by mouth daily    Routine general medical examination at a health care facility    - aspirin 81 MG EC tablet; Take 1 tablet (81 mg) by mouth daily    Hypothyroidism, unspecified type    - levothyroxine (SYNTHROID/LEVOTHROID) 75 MCG tablet; Take 1 tablet (75 mcg) by mouth  "daily    Forgetfulness    - multivitamin w/minerals (MULTIVITAMIN, THERAPEUTIC WITH MINERALS) tablet; Take 1 tablet by mouth daily    Type 2 diabetes mellitus with other specified complication, without long-term current use of insulin (H)    - Adult Eye  Referral; Future  - TSH with free T4 reflex  - Basic metabolic panel  - Hemoglobin A1c    Wheezing    He was having shortness of breath and wheezing today.  He does not have a past history of smoking and no diagnosis of asthma.  We we will need to get a chest x-ray and PFTs but the patient's not able to do that today.  Will try and get this at his next visit when he follows up in a month.  - predniSONE (DELTASONE) 20 MG tablet; Take 2 tablets (40 mg) by mouth daily for 5 days    Functional diarrhea  Patient reports diarrhea nearly every day that is been longstanding.  Does not sound like there has been a workup for this although I did not have the time to do a chart review for this particular problem.  For now we will have him take loperamide and have him follow-up to discuss this further at the next visit.  - loperamide (IMODIUM A-D) 2 MG tablet; Take 1 tablet (2 mg) by mouth daily as needed for diarrhea          BMI  Estimated body mass index is 25.02 kg/m  as calculated from the following:    Height as of this encounter: 1.575 m (5' 2\").    Weight as of this encounter: 62.1 kg (136 lb 12.8 oz).       Dain Hillman is a 84 year old, presenting for the following health issues:  Recheck Medication and Hypertension (Follow up)      5/22/2024    12:46 PM   Additional Questions   Roomed by Marisela         5/22/2024    Information    services provided? Yes   Language Other   Other Vietnamese   Type of interpretation provided Face-to-face    name Angelito PALM     Patient presents with a number of concerns.  He has diabetes and does not check his blood sugars at home.  He has been taking his medications as directed and he is not " "having any symptomatic hypoglycemia.    He has hypertension and takes his medications as directed and he does not check his blood pressure at home.  His blood pressure is elevated today.    He has hypothyroidism and is due for a TSH.  He has been on a stable dose of levothyroxine for some time.  He    After talking with the patient further it sounds like he takes medications as directed to the best of his ability.  Sounds like in the past he and his wife had a home health nurse coming out to their home to set up pills for them but when they went to [facility] for vacation for a month they were not able to reestablish those services upon return.  The son is fairly frustrated about that.  The son is trying his best to set up pills for them but is not sure that he is doing it right.  Review of Systems  There is no chest pain or pressure, no dyspnea on exertion, no cough, no hemoptysis, no nausea, vomiting, constipation, blood in the stool, black stools or urinary symptoms.      Objective    BP (!) 157/70   Pulse 92   Temp 98.4  F (36.9  C)   Resp 16   Ht 1.575 m (5' 2\")   Wt 62.1 kg (136 lb 12.8 oz)   SpO2 98%   BMI 25.02 kg/m    Body mass index is 25.02 kg/m .  Physical Exam   GENERAL: alert and no distress  RESP: lungs clear to auscultation - no rales, rhonchi or wheezes  CV: regular rate and rhythm, no murmur  ABDOMEN: obese, soft, nontender  MS: no gross musculoskeletal defects noted, no edema        Signed Electronically by: Jadiel Eller MD    "

## 2024-06-26 ENCOUNTER — PATIENT OUTREACH (OUTPATIENT)
Dept: CARE COORDINATION | Facility: CLINIC | Age: 84
End: 2024-06-26
Payer: COMMERCIAL

## 2024-06-26 NOTE — TELEPHONE ENCOUNTER
Writer talked to son Collin Campbell at [phone]. Son speaks English. Writer introduced self and explained the reason for the call is to follow up Pt;s need for SNV. Son would like to have it for this Pt and his mother who is the spouse to this Pt. MRN MRN: Writer talked to son Collin Campbell at [phone]. Son speaks English. Writer introduced self and explained the reason for the call is to follow up Pt;s need for SNV. Son would like to have it for this Pt and his father who is the spouse to this Pt. MRN MRN: [id number]  Nate Riggins.  Writer assured son that writer will work on obtaining home care agency and will report back to him.  Son says that any agency is fine. Writer assured son that writer will work on obtaining home care agency and will report back to him.  Son says that any agency is fine.     Writer spoke with Community Health Worker on this SNV referral for both patients. Writer also asked that CHW update son with the accepting agency.

## 2024-07-02 ENCOUNTER — DOCUMENTATION ONLY (OUTPATIENT)
Dept: FAMILY MEDICINE | Facility: CLINIC | Age: 84
End: 2024-07-02
Payer: COMMERCIAL

## 2024-07-02 ENCOUNTER — MEDICAL CORRESPONDENCE (OUTPATIENT)
Dept: HEALTH INFORMATION MANAGEMENT | Facility: CLINIC | Age: 84
End: 2024-07-02
Payer: COMMERCIAL

## 2024-07-25 DIAGNOSIS — I10 ESSENTIAL HYPERTENSION WITH GOAL BLOOD PRESSURE LESS THAN 140/90: ICD-10-CM

## 2024-07-25 RX ORDER — GLYCERIN ADULT
SUPPOSITORY, RECTAL RECTAL SEE ADMIN INSTRUCTIONS
Qty: 1 EACH | Refills: 0 | Status: SHIPPED | OUTPATIENT
Start: 2024-07-25 | End: 2025-07-25

## 2024-08-14 ENCOUNTER — OFFICE VISIT (OUTPATIENT)
Dept: FAMILY MEDICINE | Facility: CLINIC | Age: 84
End: 2024-08-14
Payer: COMMERCIAL

## 2024-08-14 VITALS
OXYGEN SATURATION: 97 % | BODY MASS INDEX: 25.03 KG/M2 | RESPIRATION RATE: 16 BRPM | HEIGHT: 62 IN | DIASTOLIC BLOOD PRESSURE: 70 MMHG | TEMPERATURE: 99.3 F | SYSTOLIC BLOOD PRESSURE: 148 MMHG | WEIGHT: 136 LBS | HEART RATE: 69 BPM

## 2024-08-14 DIAGNOSIS — N39.41 URGE INCONTINENCE OF URINE: ICD-10-CM

## 2024-08-14 DIAGNOSIS — Z98.1 S/P LUMBAR SPINAL FUSION: ICD-10-CM

## 2024-08-14 DIAGNOSIS — R19.7 DIARRHEA, UNSPECIFIED TYPE: ICD-10-CM

## 2024-08-14 DIAGNOSIS — M10.9 GOUT OF FOOT, UNSPECIFIED CAUSE, UNSPECIFIED CHRONICITY, UNSPECIFIED LATERALITY: Primary | ICD-10-CM

## 2024-08-14 DIAGNOSIS — I10 PRIMARY HYPERTENSION: ICD-10-CM

## 2024-08-14 DIAGNOSIS — M54.17 LUMBOSACRAL RADICULOPATHY AT S1: ICD-10-CM

## 2024-08-14 DIAGNOSIS — E11.69 TYPE 2 DIABETES MELLITUS WITH OTHER SPECIFIED COMPLICATION, WITHOUT LONG-TERM CURRENT USE OF INSULIN (H): ICD-10-CM

## 2024-08-14 LAB — HBA1C MFR BLD: 6.4 % (ref 0–5.6)

## 2024-08-14 PROCEDURE — 84550 ASSAY OF BLOOD/URIC ACID: CPT | Performed by: FAMILY MEDICINE

## 2024-08-14 PROCEDURE — 36415 COLL VENOUS BLD VENIPUNCTURE: CPT | Performed by: FAMILY MEDICINE

## 2024-08-14 PROCEDURE — 99214 OFFICE O/P EST MOD 30 MIN: CPT | Performed by: FAMILY MEDICINE

## 2024-08-14 PROCEDURE — G2211 COMPLEX E/M VISIT ADD ON: HCPCS | Performed by: FAMILY MEDICINE

## 2024-08-14 PROCEDURE — 83036 HEMOGLOBIN GLYCOSYLATED A1C: CPT | Performed by: FAMILY MEDICINE

## 2024-08-14 PROCEDURE — 80061 LIPID PANEL: CPT | Performed by: FAMILY MEDICINE

## 2024-08-14 RX ORDER — PREDNISONE 20 MG/1
40 TABLET ORAL DAILY
Qty: 14 TABLET | Refills: 0 | Status: SHIPPED | OUTPATIENT
Start: 2024-08-14 | End: 2024-08-21

## 2024-08-14 RX ORDER — OXYBUTYNIN CHLORIDE 5 MG/1
5 TABLET, EXTENDED RELEASE ORAL DAILY
Qty: 90 TABLET | Refills: 3 | Status: SHIPPED | OUTPATIENT
Start: 2024-08-14

## 2024-08-14 RX ORDER — LOPERAMIDE HYDROCHLORIDE 2 MG/1
2 TABLET ORAL 3 TIMES DAILY PRN
Qty: 90 TABLET | Refills: 11 | Status: SHIPPED | OUTPATIENT
Start: 2024-08-14

## 2024-08-14 NOTE — LETTER
August 15, 2024      Rafy Riggins  [address]  [facility] [address]        Dear Mr.Pokwal Riggins,    We are writing to inform you of your test results.    These results are within the normal range for you.  Please follow up in the clinic as directed.     Resulted Orders   Hemoglobin A1c   Result Value Ref Range    Hemoglobin A1C 6.4 (H) 0.0 - 5.6 %      Comment:      Normal <5.7%   Prediabetes 5.7-6.4%    Diabetes 6.5% or higher     Note: Adopted from ADA consensus guidelines.       If you have any questions or concerns, please call the clinic at the number listed above.       Sincerely,      Jadiel Eller MD

## 2024-08-14 NOTE — PROGRESS NOTES
The longitudinal plan of care for the diagnosis(es)/condition(s) as documented were addressed during this visit. Due to the added complexity in care, I will continue to support Rafy in the subsequent management and with ongoing continuity of care.    Diagnosis or treatment significantly limited by social determinants of health - language barrier and low health literacy.  25 minutes spent by me on the date of the encounter doing chart review, review of outside records, interpretation of tests, patient visit, documentation, and discussion with family     Assessment & Plan     Gout of foot, unspecified cause, unspecified chronicity, unspecified laterality  Will check uric acid level and make adjustments as needed.  - Uric acid; Future  - Home Care Referral  - Uric acid    Type 2 diabetes mellitus with other specified complication, without long-term current use of insulin (H)  We will check labs today and make adjustments as needed.  - Lipid panel reflex to direct LDL Non-fasting; Future  - Albumin Random Urine Quantitative with Creat Ratio; Future  - Hemoglobin A1c; Future  - Home Care Referral  - Lipid panel reflex to direct LDL Non-fasting  - Hemoglobin A1c    Urge incontinence of urine  Will add Ditropan.  - oxyBUTYnin ER (DITROPAN XL) 5 MG 24 hr tablet; Take 1 tablet (5 mg) by mouth daily  - MR Lumbar Spine w/o & w Contrast; Future    Diarrhea, unspecified type  We will restart Imodium which she has taken in the past.  - loperamide (IMODIUM A-D) 2 MG tablet; Take 1 tablet (2 mg) by mouth 3 times daily as needed for diarrhea  - MR Lumbar Spine w/o & w Contrast; Future    S/P lumbar spinal fusion  This patient is having worsening pain with radiation into the right leg and the history of previous back surgery and hardware.  Certainly a smoldering infection needs to be considered.  Will get an MRI with contrast.  I do not think he has cauda equina as the fecal incontinence that he is describing is related to diarrhea  "and not being insensate.  Also he has urinary frequency and urgency but he has not had urinary retention and he has no saddle anesthesia.  - MR Lumbar Spine w/o & w Contrast; Future  - Home Care Referral    Lumbosacral radiculopathy at S1    - MR Lumbar Spine w/o & w Contrast; Future  - predniSONE (DELTASONE) 20 MG tablet; Take 2 tablets (40 mg) by mouth daily for 7 days  - Home Care Referral    Primary hypertension  I think his goal should be a systolic of less than 150 and he is at that goal now.  He is having some lightheadedness when he stands up so I do not think we want to try and lower his blood pressure any further.      Dain Hillman is a 84 year old, presenting for the following health issues:  RECHECK      8/14/2024     2:27 PM   Additional Questions   Roomed by Oren   Accompanied by son and wife         8/14/2024    Information    services provided? Yes   Language Other   Other Icelandic   Type of interpretation provided Face-to-face    name Kellie    ID [id number]    Agency [employer]    phone number [phone]        HPI   Patient is here following up on a number of issues.  His blood pressure was not at goal at the last visit we had increased his losartan from .    Now he reports that he is sometimes \"seeing black\" when he stands up and he generally feels lightheaded.  He has not had any falls related to this and has not had any loss of consciousness.      He has a history of gout and takes allopurinol 100 mg daily.  We needed to recheck the uric acid level today.    He is having worsening pain in his back with radiation into the right leg.  This is gotten worse in the last few weeks.  He does have a history of back surgery and does have hardware in the back.  He is reporting some fecal incontinence but that sounds like this might be related to diarrhea.  He is not having inability to urinate and he does have urinary frequency and " "urgency.  He does not have saddle anesthesia.  He does not have night sweats or chills.    Review of Systems  There is no chest pain or pressure, no cough, no nausea, vomiting.      Objective    BP (!) 148/70   Pulse 69   Temp 99.3  F (37.4  C) (Oral)   Resp 16   Ht 1.575 m (5' 2\")   Wt 61.7 kg (136 lb)   SpO2 97%   BMI 24.87 kg/m    Body mass index is 24.87 kg/m .  Physical Exam   GENERAL: alert and no distress  RESP: lungs clear to auscultation - no rales, rhonchi or wheezes  CV: regular rate and rhythm, no murmur  ABDOMEN: soft, nontender  MS: no gross musculoskeletal defects noted, no edema        Signed Electronically by: Jadiel Eller MD    "

## 2024-08-14 NOTE — PROGRESS NOTES
Critical Vital Report    Did patient have a critical vital during today's visit? Yes  Which vital is reporting as critical?: Blood Pressure    I personally notified the following: Provider    Action(s) taken: I left room and notified provider personally

## 2024-08-15 LAB
CHOLEST SERPL-MCNC: 117 MG/DL
FASTING STATUS PATIENT QL REPORTED: NORMAL
HDLC SERPL-MCNC: 49 MG/DL
LDLC SERPL CALC-MCNC: 46 MG/DL
NONHDLC SERPL-MCNC: 68 MG/DL
TRIGL SERPL-MCNC: 112 MG/DL
URATE SERPL-MCNC: 4.7 MG/DL (ref 3.4–7)

## 2024-08-15 NOTE — RESULT ENCOUNTER NOTE
These results are within the normal range for you.  You have a low uric acid level which means that you should not have any flare of gout.

## 2024-09-13 ENCOUNTER — OFFICE VISIT (OUTPATIENT)
Dept: FAMILY MEDICINE | Facility: CLINIC | Age: 84
End: 2024-09-13
Payer: COMMERCIAL

## 2024-09-13 VITALS
SYSTOLIC BLOOD PRESSURE: 172 MMHG | DIASTOLIC BLOOD PRESSURE: 74 MMHG | TEMPERATURE: 98.1 F | OXYGEN SATURATION: 100 % | HEART RATE: 62 BPM | RESPIRATION RATE: 18 BRPM

## 2024-09-13 DIAGNOSIS — Z79.899 MEDICATION MANAGEMENT: Primary | ICD-10-CM

## 2024-09-13 DIAGNOSIS — E11.69 TYPE 2 DIABETES MELLITUS WITH OTHER SPECIFIED COMPLICATION, WITHOUT LONG-TERM CURRENT USE OF INSULIN (H): ICD-10-CM

## 2024-09-13 DIAGNOSIS — M15.9 OSTEOARTHRITIS OF MULTIPLE JOINTS, UNSPECIFIED OSTEOARTHRITIS TYPE: ICD-10-CM

## 2024-09-13 PROCEDURE — 99397 PER PM REEVAL EST PAT 65+ YR: CPT | Mod: 25 | Performed by: FAMILY MEDICINE

## 2024-09-13 PROCEDURE — G0008 ADMIN INFLUENZA VIRUS VAC: HCPCS | Performed by: FAMILY MEDICINE

## 2024-09-13 PROCEDURE — 99213 OFFICE O/P EST LOW 20 MIN: CPT | Mod: 25 | Performed by: FAMILY MEDICINE

## 2024-09-13 PROCEDURE — 90662 IIV NO PRSV INCREASED AG IM: CPT | Performed by: FAMILY MEDICINE

## 2024-09-13 RX ORDER — GABAPENTIN 300 MG/1
600 CAPSULE ORAL 2 TIMES DAILY
Qty: 360 CAPSULE | Refills: 3 | Status: SHIPPED | OUTPATIENT
Start: 2024-09-13

## 2024-09-13 ASSESSMENT — PATIENT HEALTH QUESTIONNAIRE - PHQ9
SUM OF ALL RESPONSES TO PHQ QUESTIONS 1-9: 10
SUM OF ALL RESPONSES TO PHQ QUESTIONS 1-9: 10
10. IF YOU CHECKED OFF ANY PROBLEMS, HOW DIFFICULT HAVE THESE PROBLEMS MADE IT FOR YOU TO DO YOUR WORK, TAKE CARE OF THINGS AT HOME, OR GET ALONG WITH OTHER PEOPLE: VERY DIFFICULT

## 2024-09-13 NOTE — PROGRESS NOTES
Preventive Care Visit  [facility]  Jadiel Eller MD, Family Medicine  Sep 13, 2024      Assessment & Plan     Medication management  We will get him in with our Pharm.D. to talk about his diabetes and poorly controlled blood pressure.  - Med Therapy Management Referral    Type 2 diabetes mellitus with other specified complication, without long-term current use of insulin (H)    - Med Therapy Management Referral  - Albumin Random Urine Quantitative with Creat Ratio; Future    Osteoarthritis of multiple joints, unspecified osteoarthritis type  He is having worsening pain with movement.  Will increase the gabapentin from 300 twice daily to 600 twice daily.  - gabapentin (NEURONTIN) 300 MG capsule; Take 2 capsules (600 mg) by mouth 2 times daily. TAKE 1 CAPSULE(300 MG) BY MOUTH TWICE DAILY AS NEEDED FOR PAIN    Patient has been advised of split billing requirements and indicates understanding: Yes        Depression Screening Follow Up        9/13/2024     8:59 AM   PHQ   PHQ-9 Total Score 10   Q9: Thoughts of better off dead/self-harm past 2 weeks Not at all         9/13/2024     8:59 AM   Last PHQ-9   1.  Little interest or pleasure in doing things 1   2.  Feeling down, depressed, or hopeless 1   3.  Trouble falling or staying asleep, or sleeping too much 1   4.  Feeling tired or having little energy 1   5.  Poor appetite or overeating 1   6.  Feeling bad about yourself 1   7.  Trouble concentrating 2   8.  Moving slowly or restless 2   Q9: Thoughts of better off dead/self-harm past 2 weeks 0   PHQ-9 Total Score 10         Follow Up Actions Taken  Patient counseled, no additional follow up at this time.     Counseling  Appropriate preventive services were addressed with this patient via screening, questionnaire, or discussion as appropriate for fall prevention, nutrition, physical activity, Tobacco-use cessation, social engagement, weight loss and cognition.  Checklist reviewing preventive  services available has been given to the patient.  Reviewed patient's diet, addressing concerns and/or questions.   He is at risk for lack of exercise and has been provided with information to increase physical activity for the benefit of his well-being.   The patient was instructed to see the dentist every 6 months.   Discussed possible causes of fatigue. Updated plan of care.  Patient reported difficulty with activities of daily living were addressed today.Information on urinary incontinence and treatment options given to patient.   The patient's PHQ-9 score is consistent with moderate depression. He was provided with information regarding depression.       See Patient Instructions    No follow-ups on file.    Dain Hillman is a 84 year old, presenting for the following:  Medicare Visit and Wellness Visit        9/13/2024     9:00 AM   Additional Questions   Roomed by RHYS   Accompanied by SON AND WIFE         9/13/2024    Information    services provided? Yes   Language Other   Other Faroese    ID [id number]    Agency [facility]  Services            Health Care Directive  Patient does not have a Health Care Directive or Living Will: Discussed advance care planning with patient; information given to patient to review.    HPI  This patient did not take his blood pressure pills today because he was in a hurry to get here on time.  Blood pressure has been poorly controlled.  He is checking it at home and it has been high at home as well.    He is having worsening pain in his back and this goes into both thighs.  He is on Cymbalta and Tylenol and gabapentin.  He is not having any red flag symptoms.            9/13/2024   General Health   How would you rate your overall physical health? (!) FAIR   Feel stress (tense, anxious, or unable to sleep) To some extent      (!) STRESS CONCERN      9/13/2024   Nutrition   Diet: Vegetarian/vegan            9/13/2024    Exercise   Days per week of moderate/strenous exercise 3 days            9/13/2024   Social Factors   Frequency of gathering with friends or relatives More than three times a week   Worry food won't last until get money to buy more No   Food not last or not have enough money for food? No   Do you have housing? (Housing is defined as stable permanent housing and does not include staying ouside in a car, in a tent, in an abandoned building, in an overnight shelter, or couch-surfing.) No   Are you worried about losing your housing? No   Lack of transportation? No   Unable to get utilities (heat,electricity)? No   Want help with housing or utility concern? No      (!) HOUSING CONCERN PRESENT      9/13/2024   Fall Risk   Fallen 2 or more times in the past year? No   Trouble with walking or balance? Yes   Reason Gait Speed Test Not Completed Patient does not tolerate an upright or standing position (e.g. wheelchair)             9/13/2024   Activities of Daily Living- Home Safety   Needs help with the following daily activites Telephone use    Transportation    Shopping    Preparing meals    Housework    Bathing    Laundry    Medication administration    Money management    Toileting    Feeding    Dressing   Safety concerns in the home None of the above       Multiple values from one day are sorted in reverse-chronological order         9/13/2024   Dental   Dentist two times every year? (!) NO            9/13/2024   Hearing Screening   Hearing concerns? None of the above            9/13/2024   Driving Risk Screening   Patient/family members have concerns about driving No            9/13/2024   General Alertness/Fatigue Screening   Have you been more tired than usual lately? (!) YES            9/13/2024   Urinary Incontinence Screening   Bothered by leaking urine in past 6 months Yes            9/13/2024   TB Screening   Were you born outside of the [place]? Yes          Today's PHQ-9 Score:       9/13/2024     8:59 AM   PHQ-9  SCORE   PHQ-9 Total Score MyChart 10 (Moderate depression)   PHQ-9 Total Score 10         9/13/2024   Substance Use   Alcohol more than 3/day or more than 7/wk No   Do you have a current opioid prescription? No   How severe/bad is pain from 1 to 10? 5/10   Do you use any other substances recreationally? No        Social History     Tobacco Use    Smoking status: Never     Passive exposure: Never    Smokeless tobacco: Never   Vaping Use    Vaping status: Never Used   Substance Use Topics    Alcohol use: Never    Drug use: Never                 Reviewed and updated as needed this visit by Provider                      Current providers sharing in care for this patient include:  Patient Care Team:  Jadiel Eller MD as PCP - General (Family Medicine)  Swapnil Muller PA-C (Orthopaedic Surgery)  [facility]    Ryan Vila PA-C as Physician Assistant  Norman Xavier [facility] as Pharmacist (Pharmacist)  Hal Dalal [facility] as Pharmacist (Pharmacist)  Debra Neal PA-C as Assigned Gastroenterology Provider  Jadiel Eller MD as Assigned PCP  Hever Glez [facility] as Community Health Worker    The following health maintenance items are reviewed in Epic and correct as of today:  Health Maintenance   Topic Date Due    ZOSTER IMMUNIZATION (1 of 2) Never done    RSV VACCINE (1 - 1-dose 75+ series) Never done    DIABETIC FOOT EXAM  03/15/2019    MEDICARE ANNUAL WELLNESS VISIT  04/19/2022    Medicare Annual MTM Pharmacist Visit (once per calendar year)  Never done    EYE EXAM  03/31/2024    MICROALBUMIN  06/29/2024    INFLUENZA VACCINE (1) 09/01/2024    COVID-19 Vaccine (6 - 2023-24 season) 09/01/2024    DTAP/TDAP/TD IMMUNIZATION (4 - Td or Tdap) 11/11/2024    A1C  02/14/2025    BMP  05/22/2025    TSH W/FREE T4 REFLEX  05/22/2025    LIPID  08/14/2025    FALL RISK ASSESSMENT  09/13/2025    ADVANCE CARE PLANNING  04/21/2026    PHQ-2 (once per calendar year)  Completed    Pneumococcal Vaccine: 65+  "Years  Completed    HPV IMMUNIZATION  Aged Out    MENINGITIS IMMUNIZATION  Aged Out    RSV MONOCLONAL ANTIBODY  Aged Out    COLORECTAL CANCER SCREENING  Discontinued            Objective    Exam  BP (!) 172/74 (BP Location: Right arm, Patient Position: Sitting, Cuff Size: Adult Regular)   Pulse 62   Temp 98.1  F (36.7  C) (Oral)   Resp 18   SpO2 100%    Estimated body mass index is 24.87 kg/m  as calculated from the following:    Height as of 8/14/24: 1.575 m (5' 2\").    Weight as of 8/14/24: 61.7 kg (136 lb).    Physical Exam  GENERAL: alert and no distress  NECK: no adenopathy, no asymmetry, masses, or scars  RESP: lungs clear to auscultation - no rales, rhonchi or wheezes  CV: regular rate and rhythm, no murmur  MS: no gross musculoskeletal defects noted, no edema        9/13/2024   Mini Cog   Clock Draw Score 2 Normal    2 Normal   3 Item Recall 1 object recalled   Mini Cog Total Score 3       Multiple values from one day are sorted in reverse-chronological order              Signed Electronically by: Jadiel Eller MD    .undefined[^^  Answers submitted by the patient for this visit:  Patient Health Questionnaire (Submitted on 9/13/2024)  If you checked off any problems, how difficult have these problems made it for you to do your work, take care of things at home, or get along with other people?: Very difficult  PHQ9 TOTAL SCORE: 10    "

## 2024-09-13 NOTE — PATIENT INSTRUCTIONS
Please take 2 gabapentin capsules at a time, twice a day, for your back pain. The main side effect of this could be dizziness.    Please let me know if you find a home health nurse who is able to care for your family.  I am happy to place a referral for that.

## 2024-09-13 NOTE — NURSING NOTE
Prior to immunization administration, verified patients identity using patient s name and date of birth. Please see Immunization Activity for additional information.     Screening Questionnaire for Adult Immunization    Are you sick today?   No   Do you have allergies to medications, food, a vaccine component or latex?   No   Have you ever had a serious reaction after receiving a vaccination?   No   Do you have a long-term health problem with heart, lung, kidney, or metabolic disease (e.g., diabetes), asthma, a blood disorder, no spleen, complement component deficiency, a cochlear implant, or a spinal fluid leak?  Are you on long-term aspirin therapy?   No   Do you have cancer, leukemia, HIV/AIDS, or any other immune system problem?   No   Do you have a parent, brother, or sister with an immune system problem?   No   In the past 3 months, have you taken medications that affect  your immune system, such as prednisone, other steroids, or anticancer drugs; drugs for the treatment of rheumatoid arthritis, Crohn s disease, or psoriasis; or have you had radiation treatments?   No   Have you had a seizure, or a brain or other nervous system problem?   No   During the past year, have you received a transfusion of blood or blood    products, or been given immune (gamma) globulin or antiviral drug?   No   For women: Are you pregnant or is there a chance you could become       pregnant during the next month?   No   Have you received any vaccinations in the past 4 weeks?   No     Immunization questionnaire answers were all negative.      Patient instructed to remain in clinic for 15 minutes afterwards, and to report any adverse reactions.     Screening performed by Mark Iraheta RN on 9/13/2024 at 10:45 AM.

## 2024-09-24 ENCOUNTER — TELEPHONE (OUTPATIENT)
Dept: FAMILY MEDICINE | Facility: CLINIC | Age: 84
End: 2024-09-24
Payer: COMMERCIAL

## 2024-09-24 NOTE — TELEPHONE ENCOUNTER
MTM referral from: [facility] visit (referral by provider)    MTM referral outreach attempt #2 on September 24, 2024 at 1:42 PM      Outcome: Patient's son is busy right now and will call back at a different time to schedule    Use hp part d map for the carrier/Plan on the flowsheet      BARRETT Sykes

## 2024-09-24 NOTE — PROGRESS NOTES
The longitudinal plan of care for the diagnosis(es)/condition(s) as documented were addressed during this visit. Due to the added complexity in care, I will continue to support Romaine in the subsequent management and with ongoing continuity of care.  Diagnosis or treatment significantly limited by social determinants of health - low health literacy  25 minutes spent by me on the date of the encounter doing chart review, patient visit, documentation, and discussion with other provider(s)       2. Medication management  Will have PharmD review his blood pressure regimen.    3. Primary hypertension  His blood pressure is not adequately controlled on losartan 100 mg daily.  We will add hydrochlorothiazide as a combination pill for simplicity.  - Med Therapy Management Referral  - losartan-hydrochlorothiazide (HYZAAR) 100-25 MG tablet; Take 1 tablet by mouth daily.  Dispense: 90 tablet; Refill: 3    4. Chronic right-sided low back pain with right-sided sciatica  - diclofenac (VOLTAREN) 1 % topical gel; Apply 4 g topically 4 times daily.  Dispense: 350 g; Refill: 11    5. Lumbosacral radiculopathy at S1  - diclofenac (VOLTAREN) 1 % topical gel; Apply 4 g topically 4 times daily.  Dispense: 350 g; Refill: 11     Dain Hillman is a 84 year old, presenting for the following health issues:  Other (BP and back pain )        9/13/2024     9:00 AM   Additional Questions   Roomed by RHYS   Accompanied by SON AND WIFE     HPI     Blood pressure has been poorly controlled.  He is checking it at home and it has been in the 140s over 80s or 90s.  Occasionally it is higher than that.    We increased the gabapentin and this has helped somewhat.  He is having less nighttime pain.  He states that back pain is chronic for him and he is fairly content with the current medications that he is taking.  He is not having any red flag symptoms.    Review of Systems  CONSTITUTIONAL: NEGATIVE for fever, chills, change in weight  ENT/MOUTH:  NEGATIVE for ear, mouth and throat problems  RESP: NEGATIVE for significant cough or SOB  CV: NEGATIVE for chest pain, palpitations or peripheral edema      Objective    BP (!) 150/62   Pulse 65   Temp 97.9  F (36.6  C) (Oral)   Resp 16   Wt 62.6 kg (138 lb)   SpO2 99%   BMI 25.24 kg/m    Body mass index is 25.24 kg/m .  Physical Exam   GENERAL: alert and no distress  RESP: lungs clear to auscultation - no rales, rhonchi or wheezes  CV: regular rate and rhythm, normal S1 S2, no S3 or S4, no murmur,  MS: no gross musculoskeletal defects noted, no edema            Signed Electronically by: Jadiel Eller MD

## 2024-09-25 ENCOUNTER — OFFICE VISIT (OUTPATIENT)
Dept: FAMILY MEDICINE | Facility: CLINIC | Age: 84
End: 2024-09-25
Payer: COMMERCIAL

## 2024-09-25 ENCOUNTER — TELEPHONE (OUTPATIENT)
Dept: PHARMACY | Facility: OTHER | Age: 84
End: 2024-09-25

## 2024-09-25 VITALS
RESPIRATION RATE: 16 BRPM | TEMPERATURE: 97.9 F | WEIGHT: 138 LBS | HEART RATE: 65 BPM | SYSTOLIC BLOOD PRESSURE: 150 MMHG | OXYGEN SATURATION: 99 % | DIASTOLIC BLOOD PRESSURE: 62 MMHG | BODY MASS INDEX: 25.24 KG/M2

## 2024-09-25 DIAGNOSIS — Z79.899 MEDICATION MANAGEMENT: Primary | ICD-10-CM

## 2024-09-25 DIAGNOSIS — I10 PRIMARY HYPERTENSION: ICD-10-CM

## 2024-09-25 DIAGNOSIS — G89.29 CHRONIC RIGHT-SIDED LOW BACK PAIN WITH RIGHT-SIDED SCIATICA: ICD-10-CM

## 2024-09-25 DIAGNOSIS — M54.17 LUMBOSACRAL RADICULOPATHY AT S1: ICD-10-CM

## 2024-09-25 DIAGNOSIS — M54.41 CHRONIC RIGHT-SIDED LOW BACK PAIN WITH RIGHT-SIDED SCIATICA: ICD-10-CM

## 2024-09-25 RX ORDER — LOSARTAN POTASSIUM AND HYDROCHLOROTHIAZIDE 25; 100 MG/1; MG/1
1 TABLET ORAL DAILY
Qty: 90 TABLET | Refills: 3 | Status: SHIPPED | OUTPATIENT
Start: 2024-09-25

## 2024-09-25 NOTE — TELEPHONE ENCOUNTER
BARRETT Recruitment: [facility] insurance     Referral outreach attempt #1 on September 25, 2024      Outcome: left voicemail- Call back number [phone]    BARRETT Sykes

## 2024-09-25 NOTE — PATIENT INSTRUCTIONS
Stop taking losartan pills.    In their place take losartan/hydrochlorothiazide once a day instead.

## 2024-11-12 ENCOUNTER — HOSPITAL ENCOUNTER (OUTPATIENT)
Dept: MRI IMAGING | Facility: HOSPITAL | Age: 84
Discharge: HOME | End: 2024-11-12
Attending: FAMILY MEDICINE | Admitting: FAMILY MEDICINE
Payer: COMMERCIAL

## 2024-11-12 DIAGNOSIS — N39.41 URGE INCONTINENCE OF URINE: ICD-10-CM

## 2024-11-12 DIAGNOSIS — Z98.1 S/P LUMBAR SPINAL FUSION: ICD-10-CM

## 2024-11-12 DIAGNOSIS — R19.7 DIARRHEA, UNSPECIFIED TYPE: ICD-10-CM

## 2024-11-12 DIAGNOSIS — M54.17 LUMBOSACRAL RADICULOPATHY AT S1: ICD-10-CM

## 2024-11-12 PROCEDURE — 72158 MRI LUMBAR SPINE W/O & W/DYE: CPT

## 2024-11-12 PROCEDURE — 255N000002 HC RX 255 OP 636: Performed by: FAMILY MEDICINE

## 2024-11-12 PROCEDURE — A9585 GADOBUTROL INJECTION: HCPCS | Performed by: FAMILY MEDICINE

## 2024-11-12 RX ORDER — GADOBUTROL 604.72 MG/ML
0.1 INJECTION INTRAVENOUS ONCE
Status: COMPLETED | OUTPATIENT
Start: 2024-11-12 | End: 2024-11-12

## 2024-11-12 RX ADMIN — GADOBUTROL 6 ML: 604.72 INJECTION INTRAVENOUS at 10:42

## 2024-11-19 ENCOUNTER — TELEPHONE (OUTPATIENT)
Dept: FAMILY MEDICINE | Facility: CLINIC | Age: 84
End: 2024-11-19
Payer: COMMERCIAL

## 2024-11-19 NOTE — TELEPHONE ENCOUNTER
MTM appointment cancelled, we made one more attempt to reschedule.         See Simone PEREZ   [phone]

## 2024-11-27 ENCOUNTER — OFFICE VISIT (OUTPATIENT)
Dept: FAMILY MEDICINE | Facility: CLINIC | Age: 84
End: 2024-11-27
Payer: COMMERCIAL

## 2024-11-27 VITALS
SYSTOLIC BLOOD PRESSURE: 143 MMHG | HEART RATE: 82 BPM | TEMPERATURE: 98.4 F | OXYGEN SATURATION: 97 % | DIASTOLIC BLOOD PRESSURE: 73 MMHG | RESPIRATION RATE: 18 BRPM

## 2024-11-27 DIAGNOSIS — I10 PRIMARY HYPERTENSION: Primary | ICD-10-CM

## 2024-11-27 DIAGNOSIS — M15.9 OSTEOARTHRITIS OF MULTIPLE JOINTS, UNSPECIFIED OSTEOARTHRITIS TYPE: ICD-10-CM

## 2024-11-27 DIAGNOSIS — R68.89 FORGETFULNESS: ICD-10-CM

## 2024-11-27 DIAGNOSIS — E11.69 TYPE 2 DIABETES MELLITUS WITH OTHER SPECIFIED COMPLICATION, WITHOUT LONG-TERM CURRENT USE OF INSULIN (H): ICD-10-CM

## 2024-11-27 DIAGNOSIS — M54.41 CHRONIC RIGHT-SIDED LOW BACK PAIN WITH RIGHT-SIDED SCIATICA: ICD-10-CM

## 2024-11-27 DIAGNOSIS — G89.29 CHRONIC RIGHT-SIDED LOW BACK PAIN WITH RIGHT-SIDED SCIATICA: ICD-10-CM

## 2024-11-27 PROCEDURE — 99214 OFFICE O/P EST MOD 30 MIN: CPT | Mod: GC

## 2024-11-27 RX ORDER — LOSARTAN POTASSIUM AND HYDROCHLOROTHIAZIDE 25; 100 MG/1; MG/1
1 TABLET ORAL DAILY
Qty: 90 TABLET | Refills: 3 | Status: SHIPPED | OUTPATIENT
Start: 2024-11-27

## 2024-11-27 RX ORDER — AMLODIPINE BESYLATE 5 MG/1
5 TABLET ORAL DAILY
Qty: 90 TABLET | Refills: 3 | Status: SHIPPED | OUTPATIENT
Start: 2024-11-27

## 2024-11-27 NOTE — PATIENT INSTRUCTIONS
Blood pressure medications:    -Continue losartan-hydrochlorothiazide 100-25 mg daily  -Start amlodipine 5 mg daily      Follow-up in 3 weeks for recheck.

## 2024-11-27 NOTE — PROGRESS NOTES
Preceptor Attestation:    I discussed the patient with the resident and evaluated the patient in person. I have verified the content of the note, which accurately reflects my assessment of the patient and the plan of care.   Supervising Physician:  Kristian Muhammad MD.

## 2024-11-27 NOTE — PROGRESS NOTES
Assessment & Plan     Primary hypertension  Control improving. At last BP check with Dr. Eller, patient had been switched from losartan 100 mg daily to losartan-hydrochlorothiazide 100-25 mg daily. He has tolerated this well. Discussed adding additional medication for tighter BP control versus holding off. Via shared decision making, elected to start additional agent. Therefore started amlodipine 5 mg daily. If tolerating well but BP remaining elevated, consider increasing to 10 mg at next visit.  - amLODIPine (NORVASC) 5 MG tablet; Take 1 tablet (5 mg) by mouth daily.  - losartan-hydrochlorothiazide (HYZAAR) 100-25 MG tablet; Take 1 tablet by mouth daily.  - Home Care Referral    Type 2 diabetes mellitus with other specified complication, without long-term current use of insulin (H)  Not explicitly discussed today, but previous results reviewed. Last hemoglobin A1c ~3 months ago showed good control at 6.4%. On review, appears T2DM has been under similarly good control for several years now. Can consider repeating A1c at next visit vs waiting another ~3 months.    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 Pain is at it's worst when first standing from a seated position. Uses wheelchair out of house, primarily cane in the house. Does have a walker at home; encouraged use. Has participated in PT before, may benefit from additional therapeutic exercise; consider discussing at future visit.  - Consider home PT    Preventative cares  - Patient was agreeable to Tdap but system notes he goes NOT have medicare part B, so deferred to pharmacy  - Also advised RSV vaccine at pharmacy    Return in about 3 weeks (around 12/18/2024). Advised bringing all medications to this visit. This patient would benefit from thorough medication review (consider MTM referral).    Subjective   Prithi is  a 84 year old, presenting for the following health issues:  RECHECK (BP and MRI)        11/27/2024     8:45 AM   Additional Questions   Roomed by ABBY   Accompanied by SELF WIFE AND SON         11/27/2024    Information    services provided? Yes   Language --   Type of interpretation provided Face-to-face    name RITESH FRAGOSO    Agency [facility]   Patient presenting today for follow-up of blood pressure. They are checking his blood pressure at home. For the most part, blood pressures are around 145-165/85-90. Endorses some dizziness, but this is a longstanding problem. Denies headaches, chest pain, and SOB.    Continues to have some lower back pain. MRI ordered by Dr. Eller showed no significant changes compared to 2019. Pain doesn't bother if sitting still. Pain is severe when rising from sitting to standing but then improves after adjusting a bit. Pain is in right lower back, does not radiate down the legs. Pain is well-managed with Tylenol, gabapentin, and diclofenac.        Objective    BP (!) 143/73   Pulse 82   Temp 98.4  F (36.9  C) (Oral)   Resp 18   SpO2 97%   There is no height or weight on file to calculate BMI.  Physical Exam   GENERAL: alert and no distress, seated comfortable in a wheelchair  HEENT: normocephalic, atraumatic, sclera/conjunctiva clear, no rhinorrhea, moist mucus membranes  NECK: no asymmetry, masses, or scars  RESP: lungs clear to auscultation - no rales, rhonchi or wheezes  CV: regular rate and rhythm, normal S1 S2, no S3 or S4, no murmur  MS: no gross musculoskeletal defects noted, no edema  SKIN: no suspicious lesions or rashes on exposed skin  NEURO: normal strength and tone, mentation intact and speech normal  PSYCH: mentation appears normal, affect normal/bright          Signed Electronically by: Elizabeth Frances MD

## 2024-12-04 DIAGNOSIS — E78.5 HYPERLIPIDEMIA LDL GOAL <100: ICD-10-CM

## 2024-12-04 DIAGNOSIS — M1A.9XX0 CHRONIC GOUT WITHOUT TOPHUS, UNSPECIFIED CAUSE, UNSPECIFIED SITE: ICD-10-CM

## 2024-12-04 DIAGNOSIS — E11.9 TYPE 2 DIABETES MELLITUS WITHOUT COMPLICATION, WITHOUT LONG-TERM CURRENT USE OF INSULIN (H): ICD-10-CM

## 2024-12-04 RX ORDER — ATORVASTATIN CALCIUM 40 MG/1
40 TABLET, FILM COATED ORAL DAILY
Qty: 90 TABLET | Refills: 3 | Status: SHIPPED | OUTPATIENT
Start: 2024-12-04

## 2024-12-04 RX ORDER — ALLOPURINOL 100 MG/1
100 TABLET ORAL DAILY
Qty: 90 TABLET | Refills: 3 | Status: SHIPPED | OUTPATIENT
Start: 2024-12-04

## 2024-12-04 NOTE — TELEPHONE ENCOUNTER
Name from pharmacy: ATORVASTATIN 40MG TABLETS          Will file in chart as: atorvastatin (LIPITOR) 40 MG tablet    Sig: TAKE 1 TABLET(40 MG) BY MOUTH DAILY    Disp: 90 tablet    Refills: 3 (Pharmacy requested: Not specified)    Start: 12/4/2024    Class: E-Prescribe    Non-formulary For: Hyperlipidemia LDL goal <100    Last ordered: 6 months ago (5/22/2024) by Jadiel Eller MD    Last refill: 2/29/2024    Rx #: [id number]    Antihyperlipidemic agents Ibwpoa8212/04/2024 10:01 AM   Protocol Details LDL on file in the past 12 months    Medication is active on med list    Recent (12 mo) or future (90 days) visit within the authorizing provider's specialty    Patient is age 18 years or older       Name from pharmacy: ALLOPURINOL 100MG TABLETS         Will file in chart as: allopurinol (ZYLOPRIM) 100 MG tablet    Sig: TAKE 1 TABLET(100 MG) BY MOUTH DAILY    Disp: 90 tablet    Refills: 3 (Pharmacy requested: Not specified)    Start: 12/4/2024    Class: E-Prescribe    Non-formulary For: Chronic gout without tophus, unspecified cause, unspecified site    Last ordered: 6 months ago (5/22/2024) by Jadiel Eller MD    Last refill: 2/29/2024    Rx #: [id number]    Gout Agents Protocol Lbqypf9312/04/2024 10:01 AM   Protocol Details CBC on file in past 12 months    ALT on file in past 12 months    Has Uric Acid on file in past 12 months and value is less than 6    Medication is active on med list    Medication indicated for associated diagnosis    Has GFR on file in past 12 months and most recent value is normal    Recent (12 mo) or future (90 days) visit within the authorizing provider's specialty    Patient is age 18 or older       Name from pharmacy: METFORMIN 500MG TABLETS         Will file in chart as: metFORMIN (GLUCOPHAGE) 500 MG tablet    Sig: TAKE 2 TABLETS(1,000MG) EVERY MORNING AND 1 TABLET(500 MG) EVERY EVENING    Disp: 90 tablet    Refills: Not specified    Start: 12/4/2024    Class: E-Prescribe     For: Type 2 diabetes mellitus without complication, without long-term current use of insulin (H)    Last ordered: 6 months ago (5/22/2024) by Jadiel Eller MD    Last refill: 4/13/2023    Rx #: [id number]    Biguanide Agents Skqgsl4312/04/2024 10:01 AM   Protocol Details Patient is age 10 or older    Patient has documented A1c within the specified period of time.    Patient does NOT have a diagnosis of CHF.    Medication is active on med list    Medication indicated for associated diagnosis    Has GFR on file in past 12 months and most recent value is normal    Recent (6 mo) or future (90 days) visit within the authorizing provider's specialty        LDL Cholesterol Calculated   Date Value Ref Range Status   08/14/2024 46 <=100 mg/dL Final   04/19/2021 48 0 - 129 mg/dL Final     Hemoglobin A1C   Date Value Ref Range Status   08/14/2024 6.4 (H) 0.0 - 5.6 % Final     Comment:     Normal <5.7%   Prediabetes 5.7-6.4%    Diabetes 6.5% or higher     Note: Adopted from ADA consensus guidelines.   04/19/2021 5.9 (H) 4.1 - 5.7 % Final     Prescription approved per [facility] Refill Protocol.  JANEY Venegas, BSN

## 2025-01-26 NOTE — PROGRESS NOTES
Assessment & Plan     Gastroesophageal reflux disease with esophagitis without hemorrhage  Recent worsening, no correlated diet changes identified. Transitioned therapy from famotidine 40 mg at bedtime to 20 mg BID with meals. Will trial this, if no improvement patient to return to clinic for trial of PPI.   - famotidine (PEPCID) 20 MG tablet  Dispense: 180 tablet; Refill: 1    Type 2 diabetes mellitus without complication, without long-term current use of insulin (H)  Well controlled, A1c of 6.9% today. Continues taking 1,500 mg metformin daily. No concerns today. Will collect monitoring labs as below. If remains well controlled, consider follow up in 6 months.   - Hemoglobin A1c  - Basic metabolic panel    Primary hypertension  At goal, /53 today. Continues taking hyzaar (losartan 100 mg and hydrochlorothiazide 25 mg) and amlodipine 5 mg without adverse effects.     Mixed hyperlipidemia  Well controlled, LDL 46 and cholesterol 117 at last check 08/2024. Not due for monitoring today. Continues taking lipitor 40 mg without adverse effects.     Functional diarrhea  Daily watery stools. No weight changes or blood in stool. Taking PRN imodium for occasional frequent stools, finds this effective. Recommend patient taking probiotic or add dietary or supplemental fiber. His son will look for these at the pharmacy as they do not appear to be covered by insurance.           Return in about 6 months (around 7/27/2025) for Follow up.      Dain Hillman is a 85 year old, presenting for the following health issues:  Follow Up (BP ) and Medication Follow-up        1/27/2025     9:53 AM   Additional Questions   Roomed by    Accompanied by son         1/27/2025    Information    services provided? No     HPI     Rafy Riggins is an 85 year old male with a history of HTN, T2DM, and HLD. He is also concerned about his GERD and diarrhea today. He is here with his son who is his caretaker  and was a  for several years.     Hypertension Follow-up    Do you check your blood pressure regularly outside of the clinic? Yes   Are you following a low salt diet? Yes  Are your blood pressures ever more than 140 on the top number (systolic) OR more   than 90 on the bottom number (diastolic), for example 140/90? No  How many days per week do you miss taking your medication? 0    Denies blurry vision or headaches.    Diabetes Follow-up    How often are you checking your blood sugar? Not at all  What concerns do you have today about your diabetes? None   Do you have any of these symptoms? (Select all that apply)  No numbness or tingling in feet.  No redness, sores or blisters on feet.  No complaints of excessive thirst.  No reports of blurry vision.  No significant changes to weight.  Have you had a diabetic eye exam in the last 12 months? 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   Yes- lipitor 40 mg   Are you having muscle aches or other side effects that you think could be caused by your cholesterol lowering medication?  No      GERD: reports burning sensation after he eats, getting worse lately. Using famotidine 40 mg at bedtime.   Diarrhea: liquid stools most days, multiple times a day most days, occasional painful flatulence. Taking imodium PRN, it is helpful. No changes in weight, no blood in  "stool.           Objective    /53   Pulse 83   Temp 97.7  F (36.5  C) (Tympanic)   Resp 16   Ht 1.575 m (5' 2.01\")   Wt 61.5 kg (135 lb 9.6 oz)   SpO2 95%   BMI 24.80 kg/m    Body mass index is 24.8 kg/m .  Physical Exam  Vitals reviewed.   Constitutional:       General: He is not in acute distress.     Appearance: Normal appearance. He is not ill-appearing.   HENT:      Head: Normocephalic.   Cardiovascular:      Rate and Rhythm: Normal rate and regular rhythm.      Heart sounds: No murmur heard.  Pulmonary:      Effort: Pulmonary effort is normal. No respiratory distress.   Skin:     General: Skin is warm and dry.   Neurological:      Mental Status: He is alert.        Results for orders placed or performed in visit on 01/27/25 (from the past 24 hours)   Hemoglobin A1c   Result Value Ref Range    Estimated Average Glucose 151 (H) <117 mg/dL    Hemoglobin A1C 6.9 (H) 0.0 - 5.6 %           Signed Electronically by: Thais Díaz DO    "

## 2025-01-27 ENCOUNTER — OFFICE VISIT (OUTPATIENT)
Dept: FAMILY MEDICINE | Facility: CLINIC | Age: 85
End: 2025-01-27
Payer: COMMERCIAL

## 2025-01-27 VITALS
DIASTOLIC BLOOD PRESSURE: 53 MMHG | HEART RATE: 83 BPM | HEIGHT: 62 IN | TEMPERATURE: 97.7 F | BODY MASS INDEX: 24.95 KG/M2 | SYSTOLIC BLOOD PRESSURE: 107 MMHG | WEIGHT: 135.6 LBS | RESPIRATION RATE: 16 BRPM | OXYGEN SATURATION: 95 %

## 2025-01-27 DIAGNOSIS — E11.9 TYPE 2 DIABETES MELLITUS WITHOUT COMPLICATION, WITHOUT LONG-TERM CURRENT USE OF INSULIN (H): ICD-10-CM

## 2025-01-27 DIAGNOSIS — K59.1 FUNCTIONAL DIARRHEA: ICD-10-CM

## 2025-01-27 DIAGNOSIS — E78.2 MIXED HYPERLIPIDEMIA: ICD-10-CM

## 2025-01-27 DIAGNOSIS — I10 PRIMARY HYPERTENSION: Primary | ICD-10-CM

## 2025-01-27 DIAGNOSIS — K21.00 GASTROESOPHAGEAL REFLUX DISEASE WITH ESOPHAGITIS WITHOUT HEMORRHAGE: ICD-10-CM

## 2025-01-27 LAB
ANION GAP SERPL CALCULATED.3IONS-SCNC: 8 MMOL/L (ref 7–15)
BUN SERPL-MCNC: 15.6 MG/DL (ref 8–23)
CALCIUM SERPL-MCNC: 10.4 MG/DL (ref 8.8–10.4)
CHLORIDE SERPL-SCNC: 104 MMOL/L (ref 98–107)
CREAT SERPL-MCNC: 1.24 MG/DL (ref 0.67–1.17)
EGFRCR SERPLBLD CKD-EPI 2021: 57 ML/MIN/1.73M2
EST. AVERAGE GLUCOSE BLD GHB EST-MCNC: 151 MG/DL
GLUCOSE SERPL-MCNC: 168 MG/DL (ref 70–99)
HBA1C MFR BLD: 6.9 % (ref 0–5.6)
HCO3 SERPL-SCNC: 25 MMOL/L (ref 22–29)
POTASSIUM SERPL-SCNC: 4 MMOL/L (ref 3.4–5.3)
SODIUM SERPL-SCNC: 137 MMOL/L (ref 135–145)

## 2025-01-27 RX ORDER — FAMOTIDINE 20 MG/1
20 TABLET, FILM COATED ORAL 2 TIMES DAILY PRN
Qty: 180 TABLET | Refills: 1 | Status: SHIPPED | OUTPATIENT
Start: 2025-01-27 | End: 2025-07-26

## 2025-01-27 NOTE — PROGRESS NOTES
Preceptor Attestation:    I discussed the patient with the resident and evaluated the patient in person. I have verified the content of the note, which accurately reflects my assessment of the patient and the plan of care.   Supervising Physician:  Hal Damico MD.

## 2025-01-28 NOTE — RESULT ENCOUNTER NOTE
Please call patient/son with a [place]  to review results. A1c at goal, continue metformin without change. BMP showing normal electrolytes but Cr was elevated, may be KELSEA vs development of CKD. Please have him schedule for a recheck in 1 month with me. Thanks! - ZORA

## 2025-02-03 NOTE — PROGRESS NOTES
Assessment & Plan   Problem List Items Addressed This Visit    None  Visit Diagnoses       Medication management    -  Primary    Need for Tdap vaccination        Need for vaccination against respiratory syncytial virus        Type 2 diabetes mellitus without complication, without long-term current use of insulin (H)        Relevant Medications    metFORMIN (GLUCOPHAGE) 500 MG tablet    dapagliflozin (FARXIGA) 5 MG TABS tablet    Benign prostatic hyperplasia with urinary frequency        Relevant Medications    tamsulosin (FLOMAX) 0.4 MG capsule         The longitudinal plan of care for the diagnosis(es)/condition(s) as documented were addressed during this visit. Due to the added complexity in care, I will continue to support him in the subsequent management and with ongoing continuity of care.  Diagnosis or treatment significantly limited by social determinants of health - low health literacy; language barrier  30 minutes spent by me on the date of the encounter doing chart review, patient visit, documentation.  Assessment & Plan  Need for Tdap vaccination         Need for vaccination against respiratory syncytial virus         Medication management         Type 2 diabetes mellitus without complication, without long-term current use of insulin (H)    Orders:    metFORMIN (GLUCOPHAGE) 500 MG tablet; TAKE 1 TABLET a day.    dapagliflozin (FARXIGA) 5 MG TABS tablet; Take 1 tablet (5 mg) by mouth daily.    Benign prostatic hyperplasia with urinary frequency    Orders:    tamsulosin (FLOMAX) 0.4 MG capsule; Take 1 capsule (0.4 mg) by mouth daily.         Patient Instructions   I recommend stopping the oxybutynin as this may cause a dry mouth.    Let's try tamsulosin instead to help with the bladder and prostate.    Let's decrease the metformin to 500 mg once a day.  We will add dapagliflozin once a day which will help the kidneys as well as treating the diabetes.     FUTURE APPOINTMENTS:       - Follow-up visit  scheduled 3/12/25 with Dr. Eller    No follow-ups on file.      Subjective   Rafy is a 85 year old, presenting for the following health issues:  - Diabetes management  - Diarrhea        2/4/2025    11:06 AM   Additional Questions   Roomed by mao   Accompanied by self and son         2/4/2025    Information    services provided? Yes   Language Other   Other French   Type of interpretation provided Face-to-face    name Sivan Ojeda    [employer]    phone number [phone]     HPI     Rafy Riggins is an 85 year old male with a history of HTN, T2DM, GERD, and HLD who presents with concerns for diabetes management and diarrhea. He is here with his son who is his caretaker and was a  for several years. A professional  was utilized for the entirety of the interview.    Rafy expressed interest in knowing if his diabetes is well controlled. His son also inquired about the need for insulin, T1DM vs T2DM, and the prognosis of T2DM. Rafy reports taking his Metformin as prescribed. He denies medication side effects, but has noted having a dry mouth without increased thirst, and polyuria. He has no vision changes. He reports subjective weight loss which he describes as minimal and attributes to aging.     Rafy is also endorsing diarrhea and reports being recently prescribed anti-diarrheals. We discussed how metformin may be contributing to his diarrhea and how oxybutynin, which he takes for incontinence, may be contributing to dry mouth.     Rafy also endorsed acid reflux but reports it is well managed with over the counter medications. He also reports a history of headaches which resolved with blood pressure management, and a history of stroke which has causes unchanged weakness to his right lower extremity.      Diabetes Medication(s)       Biguanides       metFORMIN (GLUCOPHAGE) 500 MG tablet TAKE 2  TABLETS(1,000MG) EVERY MORNING AND 1 TABLET(500 MG) EVERY EVENING            Patient Active Problem List   Diagnosis    HTN (hypertension)    Back pain    Poor vision    S/P lumbar spinal fusion    Colon polyp    Type 2 diabetes mellitus with other specified complication, without long-term current use of insulin (H)    Obesity    Lumbosacral radiculopathy at S1    Gout of foot    Hypothyroidism        Last Comprehensive Metabolic Panel:  Lab Results   Component Value Date     01/27/2025    POTASSIUM 4.0 01/27/2025    CHLORIDE 104 01/27/2025    CO2 25 01/27/2025    ANIONGAP 8 01/27/2025     (H) 01/27/2025    BUN 15.6 01/27/2025    CR 1.24 (H) 01/27/2025    GFRESTIMATED 57 (L) 01/27/2025    MAY 10.4 01/27/2025        Hemoglobin A1C   Date Value Ref Range Status   01/27/2025 6.9 (H) 0.0 - 5.6 % Final     Comment:     Normal <5.7%   Prediabetes 5.7-6.4%    Diabetes 6.5% or higher     Note: Adopted from ADA consensus guidelines.   08/14/2024 6.4 (H) 0.0 - 5.6 % Final     Comment:     Normal <5.7%   Prediabetes 5.7-6.4%    Diabetes 6.5% or higher     Note: Adopted from ADA consensus guidelines.   05/22/2024 6.2 (H) 0.0 - 5.6 % Final     Comment:     Normal <5.7%   Prediabetes 5.7-6.4%    Diabetes 6.5% or higher     Note: Adopted from ADA consensus guidelines.   04/19/2021 5.9 (H) 4.1 - 5.7 % Final   02/21/2020 6.1 (H) 4.1 - 5.7 % Final   04/08/2019 6.1 (H) 4.1 - 5.7 % Final        Recent Labs   Lab Test 08/14/24  1541 06/29/23  1037 03/31/22  1015 04/19/21  1133 04/08/19  1311   CHOL 117 184   < > 104.2 107.6   HDL 49 44   < > 45.4 43.7   LDL 46 114*   < > 48 50   TRIG 112 130   < > 52.4 71.5   CHOLHDLRATIO  --   --   --  2.3 2.5    < > = values in this interval not displayed.        BP Readings from Last 3 Encounters:   01/27/25 107/53   11/27/24 (!) 143/73   09/25/24 (!) 150/62       Current Outpatient Medications   Medication Sig Dispense Refill    acetaminophen (TYLENOL) 325 MG tablet TAKE 1 TO 2 TABLETS  BY MOUTH EVERY 6 HOURS AS NEEDED FOR MILD PAIN 100 tablet 3    allopurinol (ZYLOPRIM) 100 MG tablet TAKE 1 TABLET(100 MG) BY MOUTH DAILY 90 tablet 3    amLODIPine (NORVASC) 5 MG tablet Take 1 tablet (5 mg) by mouth daily. 90 tablet 3    ASPIRIN LOW DOSE 81 MG EC tablet TAKE 1 TABLET(81 MG) BY MOUTH DAILY 90 tablet 3    atorvastatin (LIPITOR) 40 MG tablet TAKE 1 TABLET(40 MG) BY MOUTH DAILY 90 tablet 3    Blood Pressure Monitoring (BLOOD PRESSURE MONITOR/ARM) RY USE AS DIRECTED 1 each 0    diclofenac (VOLTAREN) 1 % topical gel Apply 4 g topically 4 times daily. 350 g 11    DULoxetine (CYMBALTA) 30 MG capsule TAKE 3 CAPSULES(90 MG) BY MOUTH DAILY 90 capsule 11    famotidine (PEPCID) 20 MG tablet Take 1 tablet (20 mg) by mouth 2 times daily as needed (30 mins prior to meals). 180 tablet 1    gabapentin (NEURONTIN) 300 MG capsule Take 2 capsules (600 mg) by mouth 2 times daily. TAKE 1 CAPSULE(300 MG) BY MOUTH TWICE DAILY AS NEEDED FOR PAIN 360 capsule 3    levothyroxine (SYNTHROID/LEVOTHROID) 75 MCG tablet Take 1 tablet (75 mcg) by mouth daily 90 tablet 3    loperamide (IMODIUM A-D) 2 MG tablet Take 1 tablet (2 mg) by mouth 3 times daily as needed for diarrhea 90 tablet 11    loperamide (IMODIUM A-D) 2 MG tablet Take 1 tablet (2 mg) by mouth daily as needed for diarrhea 30 tablet 3    losartan-hydrochlorothiazide (HYZAAR) 100-25 MG tablet Take 1 tablet by mouth daily. 90 tablet 3    metFORMIN (GLUCOPHAGE) 500 MG tablet TAKE 2 TABLETS(1,000MG) EVERY MORNING AND 1 TABLET(500 MG) EVERY EVENING 90 tablet 2    multivitamin w/minerals (MULTIVITAMIN, THERAPEUTIC WITH MINERALS) tablet Take 1 tablet by mouth daily 100 tablet 3    oxyBUTYnin ER (DITROPAN XL) 5 MG 24 hr tablet Take 1 tablet (5 mg) by mouth daily 90 tablet 3     No current facility-administered medications for this visit.         PMHX/PSHX/MEDS/ALLERGIES/SHX/FHX reviewed and updated in Epic.   General: No fevers, chills   Head: No headache   CV: No chest pain or  palpitations.   Resp: No shortness of breath. No cough. No hemoptysis.   GI: No nausea or vomiting.  No constipation or diarrhea.  Objective: /67   Pulse 83   Temp 98.2  F (36.8  C) (Oral)   Resp 20   SpO2 98%    No LMP for male patient.   Gen: Well nourished and in NAD   CV: RRR - no murmurs, rubs, or gallups  Pulm: Clear to auscultation without wheezing or crackles  ABD: soft, nontender, BS intact  Extrem: no cyanosis, edema or clubbing   Psych: Euthymic         Signed Electronically by: Jadiel Eller MD

## 2025-02-04 ENCOUNTER — OFFICE VISIT (OUTPATIENT)
Dept: FAMILY MEDICINE | Facility: CLINIC | Age: 85
End: 2025-02-04
Payer: COMMERCIAL

## 2025-02-04 VITALS
DIASTOLIC BLOOD PRESSURE: 67 MMHG | SYSTOLIC BLOOD PRESSURE: 126 MMHG | TEMPERATURE: 98.2 F | HEART RATE: 83 BPM | OXYGEN SATURATION: 98 % | RESPIRATION RATE: 20 BRPM

## 2025-02-04 DIAGNOSIS — Z79.899 MEDICATION MANAGEMENT: Primary | ICD-10-CM

## 2025-02-04 DIAGNOSIS — Z29.11 NEED FOR VACCINATION AGAINST RESPIRATORY SYNCYTIAL VIRUS: ICD-10-CM

## 2025-02-04 DIAGNOSIS — N40.1 BENIGN PROSTATIC HYPERPLASIA WITH URINARY FREQUENCY: ICD-10-CM

## 2025-02-04 DIAGNOSIS — R35.0 BENIGN PROSTATIC HYPERPLASIA WITH URINARY FREQUENCY: ICD-10-CM

## 2025-02-04 DIAGNOSIS — E11.9 TYPE 2 DIABETES MELLITUS WITHOUT COMPLICATION, WITHOUT LONG-TERM CURRENT USE OF INSULIN (H): ICD-10-CM

## 2025-02-04 DIAGNOSIS — Z23 NEED FOR TDAP VACCINATION: ICD-10-CM

## 2025-02-04 RX ORDER — TAMSULOSIN HYDROCHLORIDE 0.4 MG/1
0.4 CAPSULE ORAL DAILY
Qty: 90 CAPSULE | Refills: 3 | Status: SHIPPED | OUTPATIENT
Start: 2025-02-04

## 2025-02-04 RX ORDER — DAPAGLIFLOZIN 5 MG/1
5 TABLET, FILM COATED ORAL DAILY
Qty: 90 TABLET | Refills: 3 | Status: SHIPPED | OUTPATIENT
Start: 2025-02-04

## 2025-02-04 NOTE — PATIENT INSTRUCTIONS
I recommend stopping the oxybutynin as this may cause a dry mouth.    Let's try tamsulosin instead to help with the bladder and prostate.    Let's decrease the metformin to 500 mg once a day.  We will add dapagliflozin once a day which will help the kidneys as well as treating the diabetes.

## 2025-06-08 DIAGNOSIS — M15.9 OSTEOARTHRITIS OF MULTIPLE JOINTS, UNSPECIFIED OSTEOARTHRITIS TYPE: ICD-10-CM

## 2025-06-09 RX ORDER — DULOXETIN HYDROCHLORIDE 30 MG/1
CAPSULE, DELAYED RELEASE ORAL
Qty: 270 CAPSULE | Refills: 11 | Status: SHIPPED | OUTPATIENT
Start: 2025-06-09

## 2025-06-09 NOTE — TELEPHONE ENCOUNTER
Name from pharmacy: DULOXETINE DR 30MG CAPSULES          Will file in chart as: DULoxetine (CYMBALTA) 30 MG capsule    Sig: TAKE 3 CAPSULES(90 MG) BY MOUTH DAILY    Disp: 270 capsule    Refills: Not specified    Start: 6/8/2025    Class: E-Prescribe    Non-formulary For: Osteoarthritis of multiple joints, unspecified osteoarthritis type    To pharmacy: **Patient requests 90 days supply**    Last ordered: 1 year ago (5/22/2024) by Jadiel Eller MD    Last refill: 5/8/2025    Rx #: [id number]    Serotonin-Norepinephrine Reuptake Inhibitors  [id number]/08/2025 06:40 AM   Protocol Details Medication indicated for associated diagnosis        JANEY Venegas, BSN

## 2025-06-15 DIAGNOSIS — E03.9 HYPOTHYROIDISM, UNSPECIFIED TYPE: ICD-10-CM

## 2025-06-16 RX ORDER — LEVOTHYROXINE SODIUM 75 UG/1
75 TABLET ORAL DAILY
Qty: 90 TABLET | Refills: 3 | Status: SHIPPED | OUTPATIENT
Start: 2025-06-16

## 2025-06-16 NOTE — TELEPHONE ENCOUNTER
Name from pharmacy: LEVOTHYROXINE 0.075MG (75MCG) TABS         Will file in chart as: levothyroxine (SYNTHROID/LEVOTHROID) 75 MCG tablet    Sig: TAKE 1 TABLET(75 MCG) BY MOUTH DAILY    Disp: 90 tablet    Refills: 3 (Pharmacy requested: Not specified)    Start: 6/15/2025    Class: E-Prescribe    Non-formulary For: Hypothyroidism, unspecified type    Last ordered: 1 year ago (5/22/2024) by Jadiel Eller MD    Last refill: 3/13/2025    Rx #: [id number]    Thyroid Protocol Ssexed15/15/2025 03:58 PM   Protocol Details Normal TSH on file in past 12 months             Mark Iraheta RN, MSN

## 2025-07-01 ENCOUNTER — MEDICAL CORRESPONDENCE (OUTPATIENT)
Dept: HEALTH INFORMATION MANAGEMENT | Facility: CLINIC | Age: 85
End: 2025-07-01
Payer: COMMERCIAL

## 2025-07-31 DIAGNOSIS — K21.00 GASTROESOPHAGEAL REFLUX DISEASE WITH ESOPHAGITIS WITHOUT HEMORRHAGE: ICD-10-CM

## 2025-07-31 RX ORDER — FAMOTIDINE 20 MG/1
TABLET, FILM COATED ORAL
Qty: 180 TABLET | Refills: 1 | Status: SHIPPED | OUTPATIENT
Start: 2025-07-31

## 2025-07-31 NOTE — TELEPHONE ENCOUNTER
Name from pharmacy: FAMOTIDINE 20MG TABLETS          Will file in chart as: famotidine (PEPCID) 20 MG tablet    Sig: TAKE 1 TABLET(20 MG) BY MOUTH TWICE DAILY 30 MINUTES BEFORE MEALS AS NEEDED    Disp: 180 tablet    Refills: 1 (Pharmacy requested: Not specified)    Start: 7/31/2025    Class: E-Prescribe    Non-formulary For: Gastroesophageal reflux disease with esophagitis without hemorrhage    Last ordered: 6 months ago (1/27/2025) by Thais Díaz DO    Last refill: 5/4/2025    Rx #: [id number]    H2 Blockers Protocol Stmirt2307/31/2025 03:52 AM   Protocol Details Medication is active on med list and the sig matches. RN to manually verify dose and sig if red X/fail.          Mark Iraheta, RN, MSN

## 2025-08-03 ENCOUNTER — HEALTH MAINTENANCE LETTER (OUTPATIENT)
Age: 85
End: 2025-08-03

## 2025-08-07 DIAGNOSIS — N39.41 URGE INCONTINENCE OF URINE: ICD-10-CM

## 2025-08-07 RX ORDER — OXYBUTYNIN CHLORIDE 5 MG/1
5 TABLET, EXTENDED RELEASE ORAL DAILY
Qty: 90 TABLET | Refills: 3 | Status: SHIPPED | OUTPATIENT
Start: 2025-08-07